# Patient Record
Sex: FEMALE
[De-identification: names, ages, dates, MRNs, and addresses within clinical notes are randomized per-mention and may not be internally consistent; named-entity substitution may affect disease eponyms.]

---

## 2022-04-25 ENCOUNTER — NURSE TRIAGE (OUTPATIENT)
Dept: OTHER | Facility: CLINIC | Age: 67
End: 2022-04-25

## 2022-04-25 NOTE — TELEPHONE ENCOUNTER
Subjective: Caller states \"[facility] and chest heaviness\"     At time of the visit at [address] on [address], pulse ox was 82% and NP Curtis Leos believes pt may have pneumonia. Pt advised by Curtis Leos NP at clinic that she should proceed to the ED right away. Pt planning to go to [facility]. denies any other questions or concerns; instructed to call back for any new or worsening symptoms. Patient/caller agrees to proceed to [facility] Emergency Department    This triage is a result of a call to [address] [facility]. Please do not respond to the triage nurse through this encounter. Any subsequent communication should be directly with the patient.       Reason for Disposition   Caller has already spoken with the PCP (or office), and has no further questions    Protocols used: NO CONTACT OR DUPLICATE CONTACT CALL-ADULT-[place]

## 2023-10-02 DIAGNOSIS — G89.18 POST-OP PAIN: Primary | ICD-10-CM

## 2023-10-02 RX ORDER — HYDROCODONE BITARTRATE AND ACETAMINOPHEN 5; 325 MG/1; MG/1
1 TABLET ORAL EVERY 6 HOURS PRN
COMMUNITY
End: 2023-10-02 | Stop reason: SDUPTHER

## 2023-10-02 RX ORDER — HYDROCODONE BITARTRATE AND ACETAMINOPHEN 5; 325 MG/1; MG/1
1 TABLET ORAL EVERY 6 HOURS PRN
Qty: 20 TABLET | Refills: 0 | Status: SHIPPED | OUTPATIENT
Start: 2023-10-02 | End: 2023-10-07

## 2023-10-04 DIAGNOSIS — C34.90 ADENOCARCINOMA OF LUNG, UNSPECIFIED LATERALITY (MULTI): Primary | ICD-10-CM

## 2023-10-10 ENCOUNTER — OFFICE VISIT (OUTPATIENT)
Dept: SURGERY | Facility: CLINIC | Age: 68
End: 2023-10-10
Payer: MEDICARE

## 2023-10-10 ENCOUNTER — HOSPITAL ENCOUNTER (OUTPATIENT)
Dept: RADIOLOGY | Facility: HOSPITAL | Age: 68
Discharge: HOME | End: 2023-10-10
Payer: MEDICARE

## 2023-10-10 VITALS
RESPIRATION RATE: 18 BRPM | WEIGHT: 151.46 LBS | BODY MASS INDEX: 27.7 KG/M2 | HEART RATE: 106 BPM | TEMPERATURE: 97.5 F | SYSTOLIC BLOOD PRESSURE: 155 MMHG | DIASTOLIC BLOOD PRESSURE: 79 MMHG | OXYGEN SATURATION: 93 %

## 2023-10-10 DIAGNOSIS — R91.1 LUNG NODULE: ICD-10-CM

## 2023-10-10 DIAGNOSIS — R91.1 LUNG NODULE: Primary | ICD-10-CM

## 2023-10-10 PROBLEM — Z96.1 PSEUDOPHAKIA, BOTH EYES: Status: ACTIVE | Noted: 2023-10-10

## 2023-10-10 PROBLEM — G43.909 MIGRAINE: Status: ACTIVE | Noted: 2023-10-10

## 2023-10-10 PROBLEM — E78.5 HYPERLIPIDEMIA: Status: ACTIVE | Noted: 2023-10-10

## 2023-10-10 PROBLEM — F32.A ANXIETY AND DEPRESSION: Status: ACTIVE | Noted: 2023-10-10

## 2023-10-10 PROBLEM — H01.013 BILATERAL ULCERATIVE BLEPHARITIS: Status: ACTIVE | Noted: 2023-10-10

## 2023-10-10 PROBLEM — E03.9 HYPOTHYROIDISM: Status: ACTIVE | Noted: 2023-10-10

## 2023-10-10 PROBLEM — H52.4 MYOPIA OF BOTH EYES WITH ASTIGMATISM AND PRESBYOPIA: Status: ACTIVE | Noted: 2023-10-10

## 2023-10-10 PROBLEM — H50.15 ALTERNATING EXOTROPIA: Status: ACTIVE | Noted: 2023-10-10

## 2023-10-10 PROBLEM — H25.12 AGE-RELATED NUCLEAR CATARACT OF LEFT EYE: Status: ACTIVE | Noted: 2023-10-10

## 2023-10-10 PROBLEM — R29.818 SUSPECTED SLEEP APNEA: Status: ACTIVE | Noted: 2023-10-10

## 2023-10-10 PROBLEM — I73.9 PAD (PERIPHERAL ARTERY DISEASE) (CMS-HCC): Status: ACTIVE | Noted: 2023-10-10

## 2023-10-10 PROBLEM — E53.8 B12 DEFICIENCY: Status: ACTIVE | Noted: 2023-10-10

## 2023-10-10 PROBLEM — G47.34 NOCTURNAL HYPOXIA: Status: ACTIVE | Noted: 2023-10-10

## 2023-10-10 PROBLEM — H52.13 MYOPIA OF BOTH EYES WITH ASTIGMATISM AND PRESBYOPIA: Status: ACTIVE | Noted: 2023-10-10

## 2023-10-10 PROBLEM — H35.30 AMD (AGE RELATED MACULAR DEGENERATION): Status: ACTIVE | Noted: 2023-10-10

## 2023-10-10 PROBLEM — F51.4 NIGHT TERRORS: Status: ACTIVE | Noted: 2023-10-10

## 2023-10-10 PROBLEM — I74.5 ILIAC ARTERY OCCLUSION, LEFT (MULTI): Status: ACTIVE | Noted: 2023-10-10

## 2023-10-10 PROBLEM — J44.1 COPD EXACERBATION (MULTI): Status: ACTIVE | Noted: 2023-10-10

## 2023-10-10 PROBLEM — F41.9 ANXIETY AND DEPRESSION: Status: ACTIVE | Noted: 2023-10-10

## 2023-10-10 PROBLEM — J44.9 COPD (CHRONIC OBSTRUCTIVE PULMONARY DISEASE) (MULTI): Status: ACTIVE | Noted: 2023-10-10

## 2023-10-10 PROBLEM — I65.23 BILATERAL CAROTID ARTERY STENOSIS: Status: ACTIVE | Noted: 2023-10-10

## 2023-10-10 PROBLEM — I77.1 SUBCLAVIAN ARTERY STENOSIS, LEFT (CMS-HCC): Status: ACTIVE | Noted: 2023-10-10

## 2023-10-10 PROBLEM — H04.123 BILATERAL DRY EYES: Status: ACTIVE | Noted: 2023-10-10

## 2023-10-10 PROBLEM — H25.042 POSTERIOR SUBCAPSULAR POLAR AGE-RELATED CATARACT OF LEFT EYE: Status: ACTIVE | Noted: 2023-10-10

## 2023-10-10 PROBLEM — G50.0 TRIGEMINAL NEURALGIA: Status: ACTIVE | Noted: 2023-10-10

## 2023-10-10 PROBLEM — H53.001 AMBLYOPIA, RIGHT EYE: Status: ACTIVE | Noted: 2023-10-10

## 2023-10-10 PROBLEM — H01.016 BILATERAL ULCERATIVE BLEPHARITIS: Status: ACTIVE | Noted: 2023-10-10

## 2023-10-10 PROBLEM — H52.203 MYOPIA OF BOTH EYES WITH ASTIGMATISM AND PRESBYOPIA: Status: ACTIVE | Noted: 2023-10-10

## 2023-10-10 PROCEDURE — 71046 X-RAY EXAM CHEST 2 VIEWS: CPT | Performed by: RADIOLOGY

## 2023-10-10 PROCEDURE — 99024 POSTOP FOLLOW-UP VISIT: CPT | Performed by: THORACIC SURGERY (CARDIOTHORACIC VASCULAR SURGERY)

## 2023-10-10 PROCEDURE — 71046 X-RAY EXAM CHEST 2 VIEWS: CPT | Mod: FY

## 2023-10-10 PROCEDURE — 1036F TOBACCO NON-USER: CPT | Performed by: THORACIC SURGERY (CARDIOTHORACIC VASCULAR SURGERY)

## 2023-10-10 PROCEDURE — 1159F MED LIST DOCD IN RCRD: CPT | Performed by: THORACIC SURGERY (CARDIOTHORACIC VASCULAR SURGERY)

## 2023-10-10 PROCEDURE — 1125F AMNT PAIN NOTED PAIN PRSNT: CPT | Performed by: THORACIC SURGERY (CARDIOTHORACIC VASCULAR SURGERY)

## 2023-10-10 RX ORDER — LEVOTHYROXINE SODIUM 25 UG/1
25 TABLET ORAL
COMMUNITY

## 2023-10-10 RX ORDER — GABAPENTIN 300 MG/1
3 CAPSULE ORAL 2 TIMES DAILY
COMMUNITY

## 2023-10-10 RX ORDER — ALBUTEROL SULFATE 0.83 MG/ML
2.5 SOLUTION RESPIRATORY (INHALATION) EVERY 4 HOURS PRN
COMMUNITY
Start: 2022-07-07

## 2023-10-10 RX ORDER — LORAZEPAM 1 MG/1
2 TABLET ORAL EVERY MORNING
COMMUNITY

## 2023-10-10 RX ORDER — ACETAMINOPHEN AND CODEINE PHOSPHATE 300; 30 MG/1; MG/1
1 TABLET ORAL EVERY 6 HOURS PRN
COMMUNITY
Start: 2022-05-04

## 2023-10-10 RX ORDER — TOPIRAMATE 100 MG/1
100 TABLET, FILM COATED ORAL 2 TIMES DAILY
COMMUNITY
End: 2023-10-13 | Stop reason: ALTCHOICE

## 2023-10-10 RX ORDER — DULOXETIN HYDROCHLORIDE 60 MG/1
1 CAPSULE, DELAYED RELEASE ORAL EVERY 24 HOURS
COMMUNITY
Start: 2016-05-17

## 2023-10-10 RX ORDER — ASPIRIN 81 MG/1
1 TABLET ORAL DAILY
COMMUNITY
Start: 2014-09-18 | End: 2023-10-10

## 2023-10-10 RX ORDER — FLUTICASONE PROPIONATE AND SALMETEROL 100; 50 UG/1; UG/1
1 POWDER RESPIRATORY (INHALATION)
COMMUNITY
End: 2023-10-13 | Stop reason: ALTCHOICE

## 2023-10-10 RX ORDER — DEXTROMETHORPHAN POLISTIREX 30 MG/5ML
SUSPENSION ORAL
COMMUNITY
End: 2023-10-13 | Stop reason: ALTCHOICE

## 2023-10-10 RX ORDER — SULFAMETHOXAZOLE AND TRIMETHOPRIM 800; 160 MG/1; MG/1
1 TABLET ORAL 2 TIMES DAILY
COMMUNITY
Start: 2023-09-17 | End: 2023-12-01 | Stop reason: ALTCHOICE

## 2023-10-10 RX ORDER — TIOTROPIUM BROMIDE AND OLODATEROL 3.124; 2.736 UG/1; UG/1
1 SPRAY, METERED RESPIRATORY (INHALATION) DAILY
COMMUNITY
Start: 2022-10-04 | End: 2023-10-13 | Stop reason: ALTCHOICE

## 2023-10-10 RX ORDER — ATOGEPANT 30 MG/1
1 TABLET ORAL DAILY
COMMUNITY
Start: 2022-02-11 | End: 2023-12-01 | Stop reason: ALTCHOICE

## 2023-10-10 RX ORDER — UMECLIDINIUM 62.5 UG/1
1 AEROSOL, POWDER ORAL DAILY
COMMUNITY
Start: 2022-07-07 | End: 2023-10-13 | Stop reason: ALTCHOICE

## 2023-10-10 RX ORDER — TOPIRAMATE 25 MG/1
25 TABLET ORAL DAILY
COMMUNITY
Start: 2014-09-18 | End: 2023-10-13 | Stop reason: ALTCHOICE

## 2023-10-10 RX ORDER — ALBUTEROL SULFATE 90 UG/1
2 POWDER, METERED RESPIRATORY (INHALATION) AS NEEDED
COMMUNITY
End: 2023-10-13 | Stop reason: ALTCHOICE

## 2023-10-10 RX ORDER — BUDESONIDE, GLYCOPYRROLATE, AND FORMOTEROL FUMARATE 160; 9; 4.8 UG/1; UG/1; UG/1
2 AEROSOL, METERED RESPIRATORY (INHALATION)
COMMUNITY
End: 2023-12-01 | Stop reason: ALTCHOICE

## 2023-10-10 RX ORDER — ATORVASTATIN CALCIUM 20 MG/1
20 TABLET, FILM COATED ORAL DAILY
COMMUNITY
Start: 2022-05-21

## 2023-10-10 RX ORDER — HYDROXYZINE PAMOATE 25 MG/1
25 CAPSULE ORAL 2 TIMES DAILY
COMMUNITY
End: 2023-12-01 | Stop reason: ALTCHOICE

## 2023-10-10 RX ORDER — FOLIC ACID 0.4 MG
1 TABLET ORAL EVERY MORNING
COMMUNITY

## 2023-10-10 RX ORDER — TIZANIDINE 4 MG/1
2 TABLET ORAL NIGHTLY
COMMUNITY

## 2023-10-10 RX ORDER — NALOXONE HYDROCHLORIDE 4 MG/.1ML
SPRAY NASAL
COMMUNITY
Start: 2023-09-15 | End: 2023-10-13 | Stop reason: ALTCHOICE

## 2023-10-10 RX ORDER — PROMETHAZINE HYDROCHLORIDE 25 MG/1
25 TABLET ORAL EVERY 12 HOURS
COMMUNITY
Start: 2015-06-30 | End: 2024-06-03 | Stop reason: ENTERED-IN-ERROR

## 2023-10-10 RX ORDER — ROPINIROLE 1 MG/1
1 TABLET, FILM COATED ORAL NIGHTLY
COMMUNITY

## 2023-10-10 RX ORDER — LORAZEPAM 0.5 MG/1
TABLET ORAL
COMMUNITY
Start: 2022-04-27 | End: 2023-10-13 | Stop reason: ALTCHOICE

## 2023-10-10 RX ORDER — FLUTICASONE PROPIONATE AND SALMETEROL 250; 50 UG/1; UG/1
1 POWDER RESPIRATORY (INHALATION)
COMMUNITY
Start: 2022-02-02 | End: 2023-10-31 | Stop reason: ALTCHOICE

## 2023-10-10 RX ORDER — ATORVASTATIN CALCIUM 40 MG/1
20 TABLET, FILM COATED ORAL NIGHTLY
COMMUNITY
End: 2023-10-13 | Stop reason: ALTCHOICE

## 2023-10-10 RX ORDER — ATOGEPANT 10 MG/1
1 TABLET ORAL DAILY
COMMUNITY
End: 2023-10-13 | Stop reason: ALTCHOICE

## 2023-10-10 ASSESSMENT — ENCOUNTER SYMPTOMS
DEPRESSION: 0
FATIGUE: 0
ENDOCRINE NEGATIVE: 1
WHEEZING: 0
COUGH: 0
EYES NEGATIVE: 1
ABDOMINAL DISTENTION: 0
PALPITATIONS: 0
STRIDOR: 0
OCCASIONAL FEELINGS OF UNSTEADINESS: 1
CHILLS: 0
NEUROLOGICAL NEGATIVE: 1
SHORTNESS OF BREATH: 0
APPETITE CHANGE: 0
PSYCHIATRIC NEGATIVE: 1
VOMITING: 0
MUSCULOSKELETAL NEGATIVE: 1
CONSTIPATION: 0
ABDOMINAL PAIN: 0
CHOKING: 0
CHEST TIGHTNESS: 0
FEVER: 0
UNEXPECTED WEIGHT CHANGE: 0
NAUSEA: 0
ALLERGIC/IMMUNOLOGIC NEGATIVE: 1
LOSS OF SENSATION IN FEET: 0
DIARRHEA: 0
DIAPHORESIS: 0
HEMATOLOGIC/LYMPHATIC NEGATIVE: 1

## 2023-10-10 ASSESSMENT — COLUMBIA-SUICIDE SEVERITY RATING SCALE - C-SSRS
6. HAVE YOU EVER DONE ANYTHING, STARTED TO DO ANYTHING, OR PREPARED TO DO ANYTHING TO END YOUR LIFE?: NO
1. IN THE PAST MONTH, HAVE YOU WISHED YOU WERE DEAD OR WISHED YOU COULD GO TO SLEEP AND NOT WAKE UP?: NO
2. HAVE YOU ACTUALLY HAD ANY THOUGHTS OF KILLING YOURSELF?: NO

## 2023-10-10 ASSESSMENT — PATIENT HEALTH QUESTIONNAIRE - PHQ9
1. LITTLE INTEREST OR PLEASURE IN DOING THINGS: NOT AT ALL
SUM OF ALL RESPONSES TO PHQ9 QUESTIONS 1 AND 2: 0
2. FEELING DOWN, DEPRESSED OR HOPELESS: NOT AT ALL

## 2023-10-10 ASSESSMENT — PAIN SCALES - GENERAL: PAINLEVEL: 3

## 2023-10-10 NOTE — PROGRESS NOTES
Subjective   Patient ID: Fatmata Plummer is a 68 y.o. female who presents for Post-op Thoracic Surgery.  HPI  68-year-old female with severe COPD oxygen dependent with PFT showing FEV1 of 41% and a DLCO of 31% who was found to have a right lower lobe lung nodule.  She underwent a robotic right lower lobe wedge resection and mediastinal lymphadenectomy.  She recovered very well from it.  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  From her mediastinal lymphadenectomy station 7 lymph nodes were positive for malignancy.  This is a PT1BN2.  Her x-ray today shows good lung expansion.  I discussion with her about next steps and I will refer her to medical oncology for discussion about adjuvant therapy.  I will see her again in 6 months with a CT of the chest.    Review of Systems   Constitutional:  Negative for appetite change, chills, diaphoresis, fatigue, fever and unexpected weight change.   HENT: Negative.     Eyes: Negative.    Respiratory:  Negative for cough, choking, chest tightness, shortness of breath, wheezing and stridor.    Cardiovascular:  Negative for chest pain, palpitations and leg swelling.   Gastrointestinal:  Negative for abdominal distention, abdominal pain, constipation, diarrhea, nausea and vomiting.   Endocrine: Negative.    Genitourinary: Negative.    Musculoskeletal: Negative.    Skin: Negative.    Allergic/Immunologic: Negative.    Neurological: Negative.    Hematological: Negative.    Psychiatric/Behavioral: Negative.     All other systems reviewed and are negative.      Objective   Physical Exam  Constitutional:       Appearance: Normal appearance.   HENT:      Head: Normocephalic and atraumatic.      Nose: Nose normal.      Mouth/Throat:      Mouth: Mucous membranes are moist.      Pharynx: Oropharynx is clear.   Eyes:      Extraocular Movements: Extraocular movements intact.      Conjunctiva/sclera: Conjunctivae normal.      Pupils: Pupils are equal,  round, and reactive to light.   Cardiovascular:      Rate and Rhythm: Normal rate and regular rhythm.      Pulses: Normal pulses.      Heart sounds: Normal heart sounds.   Pulmonary:      Effort: Pulmonary effort is normal. No respiratory distress.      Breath sounds: Normal breath sounds. No stridor. No wheezing, rhonchi or rales.   Chest:      Chest wall: No tenderness.   Abdominal:      General: Abdomen is flat. Bowel sounds are normal.      Palpations: Abdomen is soft.   Musculoskeletal:         General: Normal range of motion.      Cervical back: Normal range of motion and neck supple.   Skin:     General: Skin is warm and dry.      Capillary Refill: Capillary refill takes less than 2 seconds.   Neurological:      General: No focal deficit present.      Mental Status: She is alert and oriented to person, place, and time.         Assessment/Plan   Diagnoses and all orders for this visit:  Lung nodule  -     XR chest 2 views; Future  Referral to medical oncology  Follow-up with CT chest in 6 months    Franky Cho MD  Thoracic and Esophageal Surgery

## 2023-10-12 ASSESSMENT — PATIENT HEALTH QUESTIONNAIRE - PHQ9
10. IF YOU CHECKED OFF ANY PROBLEMS, HOW DIFFICULT HAVE THESE PROBLEMS MADE IT FOR YOU TO DO YOUR WORK, TAKE CARE OF THINGS AT HOME, OR GET ALONG WITH OTHER PEOPLE: SOMEWHAT DIFFICULT
8. MOVING OR SPEAKING SO SLOWLY THAT OTHER PEOPLE COULD HAVE NOTICED. OR THE OPPOSITE, BEING SO FIGETY OR RESTLESS THAT YOU HAVE BEEN MOVING AROUND A LOT MORE THAN USUAL: NOT AT ALL
9. THOUGHTS THAT YOU WOULD BE BETTER OFF DEAD, OR OF HURTING YOURSELF: NOT AT ALL
5. POOR APPETITE OR OVEREATING: MORE THAN HALF THE DAYS
9. THOUGHTS THAT YOU WOULD BE BETTER OFF DEAD, OR OF HURTING YOURSELF: NOT AT ALL
4. FEELING TIRED OR HAVING LITTLE ENERGY: MORE THAN HALF THE DAYS
4. FEELING TIRED OR HAVING LITTLE ENERGY: MORE THAN HALF THE DAYS
3. TROUBLE FALLING OR STAYING ASLEEP OR SLEEPING TOO MUCH: SEVERAL DAYS
7. TROUBLE CONCENTRATING ON THINGS, SUCH AS READING THE NEWSPAPER OR WATCHING TELEVISION: SEVERAL DAYS
6. FEELING BAD ABOUT YOURSELF - OR THAT YOU ARE A FAILURE OR HAVE LET YOURSELF OR YOUR FAMILY DOWN: MORE THAN HALF THE DAYS
7. TROUBLE CONCENTRATING ON THINGS, SUCH AS READING THE NEWSPAPER OR WATCHING TELEVISION: 1
SUM OF ALL RESPONSES TO PHQ QUESTIONS 1-9: 11
10. IF YOU CHECKED OFF ANY PROBLEMS, HOW DIFFICULT HAVE THESE PROBLEMS MADE IT FOR YOU TO DO YOUR WORK, TAKE CARE OF THINGS AT HOME, OR GET ALONG WITH OTHER PEOPLE: SOMEWHAT DIFFICULT
1. LITTLE INTEREST OR PLEASURE IN DOING THINGS: 1
2. FEELING DOWN, DEPRESSED OR HOPELESS: MORE THAN HALF THE DAYS
6. FEELING BAD ABOUT YOURSELF - OR THAT YOU ARE A FAILURE OR HAVE LET YOURSELF OR YOUR FAMILY DOWN: 2
8. MOVING OR SPEAKING SO SLOWLY THAT OTHER PEOPLE COULD HAVE NOTICED. OR THE OPPOSITE, BEING SO FIGETY OR RESTLESS THAT YOU HAVE BEEN MOVING AROUND A LOT MORE THAN USUAL: 0
SUM OF ALL RESPONSES TO PHQ QUESTIONS 1-9: 11
2. FEELING DOWN, DEPRESSED, IRRITABLE, OR HOPELESS: MORE THAN HALF THE DAYS
3. TROUBLE FALLING OR STAYING ASLEEP OR SLEEPING TOO MUCH: SEVERAL DAYS
8. MOVING OR SPEAKING SO SLOWLY THAT OTHER PEOPLE COULD HAVE NOTICED. OR THE OPPOSITE, BEING SO FIGETY OR RESTLESS THAT YOU HAVE BEEN MOVING AROUND A LOT MORE THAN USUAL: NOT AT ALL
7. TROUBLE CONCENTRATING ON THINGS, SUCH AS READING THE NEWSPAPER OR WATCHING TELEVISION: SEVERAL DAYS
6. FEELING BAD ABOUT YOURSELF - OR THAT YOU ARE A FAILURE OR HAVE LET YOURSELF OR YOUR FAMILY DOWN: MORE THAN HALF THE DAYS
9. THOUGHTS THAT YOU WOULD BE BETTER OFF DEAD, OR OF HURTING YOURSELF: 0
1. LITTLE INTEREST OR PLEASURE IN DOING THINGS: SEVERAL DAYS
5. POOR APPETITE OR OVEREATING: MORE THAN HALF THE DAYS
5. POOR APPETITE OR OVEREATING: 2
1. LITTLE INTEREST OR PLEASURE IN DOING THINGS: SEVERAL DAYS
4. FEELING TIRED OR HAVING LITTLE ENERGY: 2
2. FEELING DOWN, DEPRESSED, IRRITABLE, OR HOPELESS: 2
3. TROUBLE FALLING OR STAYING ASLEEP OR SLEEPING TOO MUCH: 1

## 2023-10-13 ENCOUNTER — OFFICE VISIT (OUTPATIENT)
Dept: HEMATOLOGY/ONCOLOGY | Facility: CLINIC | Age: 68
End: 2023-10-13
Payer: MEDICARE

## 2023-10-13 VITALS
BODY MASS INDEX: 27.59 KG/M2 | SYSTOLIC BLOOD PRESSURE: 118 MMHG | TEMPERATURE: 97.5 F | RESPIRATION RATE: 17 BRPM | HEART RATE: 108 BPM | HEIGHT: 62 IN | WEIGHT: 149.91 LBS | OXYGEN SATURATION: 95 % | DIASTOLIC BLOOD PRESSURE: 73 MMHG

## 2023-10-13 DIAGNOSIS — C34.31 MALIGNANT NEOPLASM OF LOWER LOBE OF RIGHT LUNG (MULTI): Primary | ICD-10-CM

## 2023-10-13 DIAGNOSIS — C34.90 ADENOCARCINOMA OF LUNG, UNSPECIFIED LATERALITY (MULTI): Primary | ICD-10-CM

## 2023-10-13 PROCEDURE — 1159F MED LIST DOCD IN RCRD: CPT | Performed by: INTERNAL MEDICINE

## 2023-10-13 PROCEDURE — 99215 OFFICE O/P EST HI 40 MIN: CPT | Mod: PO | Performed by: INTERNAL MEDICINE

## 2023-10-13 PROCEDURE — 1125F AMNT PAIN NOTED PAIN PRSNT: CPT | Performed by: INTERNAL MEDICINE

## 2023-10-13 PROCEDURE — 3008F BODY MASS INDEX DOCD: CPT | Performed by: INTERNAL MEDICINE

## 2023-10-13 PROCEDURE — 1036F TOBACCO NON-USER: CPT | Performed by: INTERNAL MEDICINE

## 2023-10-13 PROCEDURE — 99205 OFFICE O/P NEW HI 60 MIN: CPT | Performed by: INTERNAL MEDICINE

## 2023-10-13 RX ORDER — ALBUTEROL SULFATE 0.83 MG/ML
3 SOLUTION RESPIRATORY (INHALATION) AS NEEDED
Status: CANCELLED | OUTPATIENT
Start: 2023-12-19

## 2023-10-13 RX ORDER — ALBUTEROL SULFATE 0.83 MG/ML
3 SOLUTION RESPIRATORY (INHALATION) AS NEEDED
Status: CANCELLED | OUTPATIENT
Start: 2023-11-07

## 2023-10-13 RX ORDER — FAMOTIDINE 10 MG/ML
20 INJECTION INTRAVENOUS ONCE AS NEEDED
Status: CANCELLED | OUTPATIENT
Start: 2023-12-19

## 2023-10-13 RX ORDER — PROCHLORPERAZINE MALEATE 5 MG
10 TABLET ORAL EVERY 6 HOURS PRN
Status: CANCELLED | OUTPATIENT
Start: 2023-11-07

## 2023-10-13 RX ORDER — ALBUTEROL SULFATE 0.83 MG/ML
3 SOLUTION RESPIRATORY (INHALATION) AS NEEDED
Status: CANCELLED | OUTPATIENT
Start: 2023-11-28

## 2023-10-13 RX ORDER — DIPHENHYDRAMINE HYDROCHLORIDE 50 MG/ML
50 INJECTION INTRAMUSCULAR; INTRAVENOUS AS NEEDED
Status: CANCELLED | OUTPATIENT
Start: 2023-11-28

## 2023-10-13 RX ORDER — DIPHENHYDRAMINE HYDROCHLORIDE 50 MG/ML
50 INJECTION INTRAMUSCULAR; INTRAVENOUS AS NEEDED
Status: CANCELLED | OUTPATIENT
Start: 2023-12-19

## 2023-10-13 RX ORDER — PROCHLORPERAZINE MALEATE 5 MG
10 TABLET ORAL EVERY 6 HOURS PRN
Status: CANCELLED | OUTPATIENT
Start: 2023-12-19

## 2023-10-13 RX ORDER — PROCHLORPERAZINE EDISYLATE 5 MG/ML
10 INJECTION INTRAMUSCULAR; INTRAVENOUS EVERY 6 HOURS PRN
Status: CANCELLED | OUTPATIENT
Start: 2023-11-28

## 2023-10-13 RX ORDER — EPINEPHRINE 0.3 MG/.3ML
0.3 INJECTION SUBCUTANEOUS EVERY 5 MIN PRN
Status: CANCELLED | OUTPATIENT
Start: 2023-12-19

## 2023-10-13 RX ORDER — EPINEPHRINE 0.3 MG/.3ML
0.3 INJECTION SUBCUTANEOUS EVERY 5 MIN PRN
Status: CANCELLED | OUTPATIENT
Start: 2023-11-28

## 2023-10-13 RX ORDER — PROCHLORPERAZINE MALEATE 5 MG
10 TABLET ORAL EVERY 6 HOURS PRN
Status: CANCELLED | OUTPATIENT
Start: 2023-11-28

## 2023-10-13 RX ORDER — PROCHLORPERAZINE EDISYLATE 5 MG/ML
10 INJECTION INTRAMUSCULAR; INTRAVENOUS EVERY 6 HOURS PRN
Status: CANCELLED | OUTPATIENT
Start: 2023-12-19

## 2023-10-13 RX ORDER — DIPHENHYDRAMINE HYDROCHLORIDE 50 MG/ML
50 INJECTION INTRAMUSCULAR; INTRAVENOUS AS NEEDED
Status: CANCELLED | OUTPATIENT
Start: 2023-11-07

## 2023-10-13 RX ORDER — PROCHLORPERAZINE EDISYLATE 5 MG/ML
10 INJECTION INTRAMUSCULAR; INTRAVENOUS EVERY 6 HOURS PRN
Status: CANCELLED | OUTPATIENT
Start: 2023-11-07

## 2023-10-13 RX ORDER — FAMOTIDINE 10 MG/ML
20 INJECTION INTRAVENOUS ONCE AS NEEDED
Status: CANCELLED | OUTPATIENT
Start: 2023-11-07

## 2023-10-13 RX ORDER — EPINEPHRINE 0.3 MG/.3ML
0.3 INJECTION SUBCUTANEOUS EVERY 5 MIN PRN
Status: CANCELLED | OUTPATIENT
Start: 2023-11-07

## 2023-10-13 RX ORDER — FAMOTIDINE 10 MG/ML
20 INJECTION INTRAVENOUS ONCE AS NEEDED
Status: CANCELLED | OUTPATIENT
Start: 2023-11-28

## 2023-10-13 ASSESSMENT — ENCOUNTER SYMPTOMS
OCCASIONAL FEELINGS OF UNSTEADINESS: 1
DEPRESSION: 0
LOSS OF SENSATION IN FEET: 0

## 2023-10-13 ASSESSMENT — COLUMBIA-SUICIDE SEVERITY RATING SCALE - C-SSRS
1. IN THE PAST MONTH, HAVE YOU WISHED YOU WERE DEAD OR WISHED YOU COULD GO TO SLEEP AND NOT WAKE UP?: NO
2. HAVE YOU ACTUALLY HAD ANY THOUGHTS OF KILLING YOURSELF?: NO
6. HAVE YOU EVER DONE ANYTHING, STARTED TO DO ANYTHING, OR PREPARED TO DO ANYTHING TO END YOUR LIFE?: NO

## 2023-10-13 ASSESSMENT — PATIENT HEALTH QUESTIONNAIRE - PHQ9
2. FEELING DOWN, DEPRESSED OR HOPELESS: NOT AT ALL
SUM OF ALL RESPONSES TO PHQ9 QUESTIONS 1 AND 2: 0
1. LITTLE INTEREST OR PLEASURE IN DOING THINGS: NOT AT ALL

## 2023-10-13 ASSESSMENT — PAIN SCALES - GENERAL: PAINLEVEL: 5

## 2023-10-13 NOTE — PATIENT INSTRUCTIONS
Today you met with your Medical Oncologist.  At this appointment, you were provided lexicomp sheets on the medications included in your regimen, an information sheet on immunotherapy prior to your first infusion, and your regimen schedule was discussed.  While everyone's regimen is unique to them, your schedule will be discussed at every [facility] visit.  Some regimens need additional procedures prior to initiating.  Please have the following performed prior to your first infusion. Bloodwork.   Please know that we encourage you to call our office for any concerns after starting your treatment regimen.  A provider is always available.  [phone]  Elida Burrows     If you should decide that  you do not want treatment please give us a call, however would love to keep the appointment with Dr. Cao.  Thank you.

## 2023-10-17 ENCOUNTER — TELEPHONE (OUTPATIENT)
Dept: HEMATOLOGY/ONCOLOGY | Facility: CLINIC | Age: 68
End: 2023-10-17
Payer: MEDICARE

## 2023-10-17 NOTE — TELEPHONE ENCOUNTER
Called and spoke to patient after receiving [employer] message regarding diagnosis information. I let pt know that it would be best if patient discusses at her upcoming appt with medical oncology and pt agreed. Pt also inquired about getting a second opinion with a thoracic medical oncologist, it was explained that these appts would not be at [facility] and possibly [facility] and pt was okay with this. Will discuss with Dr. Cao and his team and place referral for second opinion. Pt agreed to plan and verbalized understanding using teach back method.

## 2023-10-18 ASSESSMENT — ENCOUNTER SYMPTOMS
EYES NEGATIVE: 1
WHEEZING: 1
GASTROINTESTINAL NEGATIVE: 1
SHORTNESS OF BREATH: 1

## 2023-10-18 NOTE — PROGRESS NOTES
"Patient ID: Fatmata Plummer is a 68 y.o. female.  Referring Physician: Franky Dee MD  [id number] [place] [address]  Primary Care Provider: Soren Covarrubias DO      Subjective    HPI  68-year-old female with severe COPD oxygen dependent with PFT showing FEV1 of 41% and a DLCO of 31% who was found to have a right lower lobe lung nodule.  She underwent a robotic right lower lobe wedge resection and mediastinal lymphadenectomy.  She recovered very well from it.  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  From her mediastinal lymphadenectomy station 7 lymph nodes were positive for malignancy.  This is a PT1BN2.  Her x-ray today shows good lung expansion.  Medical oncology for discussion about adjuvant therapy     Path reviewed.  Addendum Diagnosis   PD-L1 22C3  by Immunohistochemistry with Interpretation, pembrolizumab   (KEYTRUDA)     Block used:  A3     Interpretation: Low Expression     Tumor Proportion Score (TPS): 5 %   Review of Systems   HENT:  Negative.     Eyes: Negative.    Respiratory:  Positive for shortness of breath and wheezing.    Gastrointestinal: Negative.    Genitourinary: Negative.          Objective   BSA: 1.73 meters squared  /73 (BP Location: Right arm)   Pulse 108   Temp 36.4 °C (97.5 °F)   Resp 17   Ht 1.58 m (5' 2.21\")   Wt 68 kg (149 lb 14.6 oz)   SpO2 95% Comment: 4L  BMI 27.24 kg/m²     Family History   Adopted: Yes     Oncology History   Adenocarcinoma of lung (CMS/HCC)   10/13/2023 Initial Diagnosis    Adenocarcinoma of lung (CMS/HCC)     11/7/2023 -  Chemotherapy    Pembrolizumab, 21 Day Cycles         Fatmata Plummer  reports that she has quit smoking. Her smoking use included cigarettes. She has a 40.00 pack-year smoking history. She has never been exposed to tobacco smoke. She has never used smokeless tobacco.  She  reports that she does not currently use alcohol.  She  reports that she does not currently use " drugs.    Physical Exam  HENT:      Head: Normocephalic and atraumatic.   Eyes:      Extraocular Movements: Extraocular movements intact.      Pupils: Pupils are equal, round, and reactive to light.   Abdominal:      General: Abdomen is flat.      Palpations: Abdomen is soft.   Musculoskeletal:      Cervical back: Normal range of motion and neck supple.   Neurological:      Mental Status: She is alert.         Performance Status:  Symptomatic; fully ambulatory    Assessment/Plan      T1N2 NSCLC : Not candidate for radiation. Now S/P surgery. Consents to IO therapy    Diagnoses and all orders for this visit:  Adenocarcinoma of lung, unspecified laterality (CMS/HCC)  -     Referral to Malignant Hematology (Hematology / Oncology)  -     Clinic Appointment Request Follow up; Future  -     Clinic Appointment Request; Future  -     Infusion Appointment Request; Future  -     CBC and Auto Differential; Future  -     Comprehensive metabolic panel; Future  -     Hepatitis B surface antigen; Future  -     Hepatitis B Core Antibody, Total; Future  -     Hepatitis B surface antibody; Future  -     Acth; Future  -     Cortisol Am; Future  -     Tsh With Reflex To Free T4 If Abnormal; Future  -     Clinic Appointment Request; Future  -     Infusion Appointment Request; Future  -     CBC and Auto Differential; Future  -     Comprehensive metabolic panel; Future  -     Clinic Appointment Request; Future  -     Infusion Appointment Request; Future  -     CBC and Auto Differential; Future  -     Comprehensive metabolic panel; Future  -     Acth; Future  -     Cortisol Am; Future  -     Tsh With Reflex To Free T4 If Abnormal; Future  Other orders  -     prochlorperazine (Compazine) tablet 10 mg  -     prochlorperazine (Compazine) injection 10 mg  -     pembrolizumab (Keytruda) 200 mg in sodium chloride 0.9% 100 mL IV  -     sodium chloride 0.9 % bolus 500 mL  -     dextrose 5 % in water (D5W) bolus  -     diphenhydrAMINE (BENADryl)  injection 50 mg  -     methylPREDNISolone sod succinate (PF) (SOLU-Medrol) 40 mg/mL injection 40 mg  -     famotidine PF (Pepcid) injection 20 mg  -     EPINEPHrine (Epipen) injection syringe 0.3 mg  -     albuterol 2.5 mg /3 mL (0.083 %) nebulizer solution 3 mL  -     prochlorperazine (Compazine) tablet 10 mg  -     prochlorperazine (Compazine) injection 10 mg  -     pembrolizumab (Keytruda) 200 mg in sodium chloride 0.9% 100 mL IV  -     sodium chloride 0.9 % bolus 500 mL  -     dextrose 5 % in water (D5W) bolus  -     diphenhydrAMINE (BENADryl) injection 50 mg  -     methylPREDNISolone sod succinate (PF) (SOLU-Medrol) 40 mg/mL injection 40 mg  -     famotidine PF (Pepcid) injection 20 mg  -     EPINEPHrine (Epipen) injection syringe 0.3 mg  -     albuterol 2.5 mg /3 mL (0.083 %) nebulizer solution 3 mL  -     prochlorperazine (Compazine) tablet 10 mg  -     prochlorperazine (Compazine) injection 10 mg  -     pembrolizumab (Keytruda) 200 mg in sodium chloride 0.9% 100 mL IV  -     sodium chloride 0.9 % bolus 500 mL  -     dextrose 5 % in water (D5W) bolus  -     diphenhydrAMINE (BENADryl) injection 50 mg  -     methylPREDNISolone sod succinate (PF) (SOLU-Medrol) 40 mg/mL injection 40 mg  -     famotidine PF (Pepcid) injection 20 mg  -     EPINEPHrine (Epipen) injection syringe 0.3 mg  -     albuterol 2.5 mg /3 mL (0.083 %) nebulizer solution 3 mL             Tanisha Cao MD

## 2023-10-19 ENCOUNTER — TELEPHONE (OUTPATIENT)
Dept: HEMATOLOGY/ONCOLOGY | Facility: CLINIC | Age: 68
End: 2023-10-19
Payer: MEDICARE

## 2023-10-23 ENCOUNTER — TELEPHONE (OUTPATIENT)
Dept: HEMATOLOGY/ONCOLOGY | Facility: CLINIC | Age: 68
End: 2023-10-23
Payer: MEDICARE

## 2023-10-23 DIAGNOSIS — C34.90 ADENOCARCINOMA OF LUNG, UNSPECIFIED LATERALITY (MULTI): ICD-10-CM

## 2023-10-23 DIAGNOSIS — R91.1 LUNG NODULE: Primary | ICD-10-CM

## 2023-10-24 ASSESSMENT — PATIENT HEALTH QUESTIONNAIRE - PHQ9
1. LITTLE INTEREST OR PLEASURE IN DOING THINGS: MORE THAN HALF THE DAYS
9. THOUGHTS THAT YOU WOULD BE BETTER OFF DEAD, OR OF HURTING YOURSELF: NOT AT ALL
4. FEELING TIRED OR HAVING LITTLE ENERGY: 2
6. FEELING BAD ABOUT YOURSELF - OR THAT YOU ARE A FAILURE OR HAVE LET YOURSELF OR YOUR FAMILY DOWN: MORE THAN HALF THE DAYS
4. FEELING TIRED OR HAVING LITTLE ENERGY: MORE THAN HALF THE DAYS
10. IF YOU CHECKED OFF ANY PROBLEMS, HOW DIFFICULT HAVE THESE PROBLEMS MADE IT FOR YOU TO DO YOUR WORK, TAKE CARE OF THINGS AT HOME, OR GET ALONG WITH OTHER PEOPLE: SOMEWHAT DIFFICULT
5. POOR APPETITE OR OVEREATING: SEVERAL DAYS
7. TROUBLE CONCENTRATING ON THINGS, SUCH AS READING THE NEWSPAPER OR WATCHING TELEVISION: SEVERAL DAYS
1. LITTLE INTEREST OR PLEASURE IN DOING THINGS: MORE THAN HALF THE DAYS
8. MOVING OR SPEAKING SO SLOWLY THAT OTHER PEOPLE COULD HAVE NOTICED. OR THE OPPOSITE, BEING SO FIGETY OR RESTLESS THAT YOU HAVE BEEN MOVING AROUND A LOT MORE THAN USUAL: NOT AT ALL
9. THOUGHTS THAT YOU WOULD BE BETTER OFF DEAD, OR OF HURTING YOURSELF: 0
6. FEELING BAD ABOUT YOURSELF - OR THAT YOU ARE A FAILURE OR HAVE LET YOURSELF OR YOUR FAMILY DOWN: 2
6. FEELING BAD ABOUT YOURSELF - OR THAT YOU ARE A FAILURE OR HAVE LET YOURSELF OR YOUR FAMILY DOWN: MORE THAN HALF THE DAYS
8. MOVING OR SPEAKING SO SLOWLY THAT OTHER PEOPLE COULD HAVE NOTICED. OR THE OPPOSITE, BEING SO FIGETY OR RESTLESS THAT YOU HAVE BEEN MOVING AROUND A LOT MORE THAN USUAL: NOT AT ALL
9. THOUGHTS THAT YOU WOULD BE BETTER OFF DEAD, OR OF HURTING YOURSELF: NOT AT ALL
3. TROUBLE FALLING OR STAYING ASLEEP OR SLEEPING TOO MUCH: SEVERAL DAYS
5. POOR APPETITE OR OVEREATING: SEVERAL DAYS
2. FEELING DOWN, DEPRESSED, IRRITABLE, OR HOPELESS: MORE THAN HALF THE DAYS
7. TROUBLE CONCENTRATING ON THINGS, SUCH AS READING THE NEWSPAPER OR WATCHING TELEVISION: 1
7. TROUBLE CONCENTRATING ON THINGS, SUCH AS READING THE NEWSPAPER OR WATCHING TELEVISION: SEVERAL DAYS
3. TROUBLE FALLING OR STAYING ASLEEP OR SLEEPING TOO MUCH: SEVERAL DAYS
2. FEELING DOWN, DEPRESSED OR HOPELESS: MORE THAN HALF THE DAYS
3. TROUBLE FALLING OR STAYING ASLEEP OR SLEEPING TOO MUCH: 1
SUM OF ALL RESPONSES TO PHQ QUESTIONS 1-9: 11
2. FEELING DOWN, DEPRESSED, IRRITABLE, OR HOPELESS: 2
4. FEELING TIRED OR HAVING LITTLE ENERGY: MORE THAN HALF THE DAYS
8. MOVING OR SPEAKING SO SLOWLY THAT OTHER PEOPLE COULD HAVE NOTICED. OR THE OPPOSITE, BEING SO FIGETY OR RESTLESS THAT YOU HAVE BEEN MOVING AROUND A LOT MORE THAN USUAL: 0
5. POOR APPETITE OR OVEREATING: 1
10. IF YOU CHECKED OFF ANY PROBLEMS, HOW DIFFICULT HAVE THESE PROBLEMS MADE IT FOR YOU TO DO YOUR WORK, TAKE CARE OF THINGS AT HOME, OR GET ALONG WITH OTHER PEOPLE: SOMEWHAT DIFFICULT
1. LITTLE INTEREST OR PLEASURE IN DOING THINGS: 2
SUM OF ALL RESPONSES TO PHQ QUESTIONS 1-9: 11

## 2023-10-25 ENCOUNTER — OFFICE VISIT (OUTPATIENT)
Dept: HEMATOLOGY/ONCOLOGY | Facility: CLINIC | Age: 68
End: 2023-10-25
Payer: MEDICARE

## 2023-10-25 VITALS
WEIGHT: 151.24 LBS | HEART RATE: 94 BPM | OXYGEN SATURATION: 100 % | DIASTOLIC BLOOD PRESSURE: 71 MMHG | BODY MASS INDEX: 27.48 KG/M2 | TEMPERATURE: 96.4 F | SYSTOLIC BLOOD PRESSURE: 106 MMHG | RESPIRATION RATE: 20 BRPM

## 2023-10-25 DIAGNOSIS — C34.90 ADENOCARCINOMA OF LUNG, UNSPECIFIED LATERALITY (MULTI): ICD-10-CM

## 2023-10-25 DIAGNOSIS — R91.1 LUNG NODULE: ICD-10-CM

## 2023-10-25 DIAGNOSIS — C34.91: Primary | ICD-10-CM

## 2023-10-25 DIAGNOSIS — Z99.81 DEPENDENCE ON CONTINUOUS SUPPLEMENTAL OXYGEN: ICD-10-CM

## 2023-10-25 DIAGNOSIS — Z90.2 STATUS POST LOBECTOMY OF LUNG: ICD-10-CM

## 2023-10-25 PROCEDURE — 99215 OFFICE O/P EST HI 40 MIN: CPT | Performed by: STUDENT IN AN ORGANIZED HEALTH CARE EDUCATION/TRAINING PROGRAM

## 2023-10-25 PROCEDURE — 1160F RVW MEDS BY RX/DR IN RCRD: CPT | Performed by: STUDENT IN AN ORGANIZED HEALTH CARE EDUCATION/TRAINING PROGRAM

## 2023-10-25 PROCEDURE — 1125F AMNT PAIN NOTED PAIN PRSNT: CPT | Performed by: STUDENT IN AN ORGANIZED HEALTH CARE EDUCATION/TRAINING PROGRAM

## 2023-10-25 PROCEDURE — 3008F BODY MASS INDEX DOCD: CPT | Performed by: STUDENT IN AN ORGANIZED HEALTH CARE EDUCATION/TRAINING PROGRAM

## 2023-10-25 PROCEDURE — 1036F TOBACCO NON-USER: CPT | Performed by: STUDENT IN AN ORGANIZED HEALTH CARE EDUCATION/TRAINING PROGRAM

## 2023-10-25 PROCEDURE — 99205 OFFICE O/P NEW HI 60 MIN: CPT | Performed by: STUDENT IN AN ORGANIZED HEALTH CARE EDUCATION/TRAINING PROGRAM

## 2023-10-25 PROCEDURE — 1159F MED LIST DOCD IN RCRD: CPT | Performed by: STUDENT IN AN ORGANIZED HEALTH CARE EDUCATION/TRAINING PROGRAM

## 2023-10-25 ASSESSMENT — COLUMBIA-SUICIDE SEVERITY RATING SCALE - C-SSRS
2. HAVE YOU ACTUALLY HAD ANY THOUGHTS OF KILLING YOURSELF?: NO
6. HAVE YOU EVER DONE ANYTHING, STARTED TO DO ANYTHING, OR PREPARED TO DO ANYTHING TO END YOUR LIFE?: NO
1. IN THE PAST MONTH, HAVE YOU WISHED YOU WERE DEAD OR WISHED YOU COULD GO TO SLEEP AND NOT WAKE UP?: NO

## 2023-10-25 ASSESSMENT — PAIN SCALES - GENERAL: PAINLEVEL: 5

## 2023-10-25 ASSESSMENT — ENCOUNTER SYMPTOMS
LOSS OF SENSATION IN FEET: 0
DEPRESSION: 0
OCCASIONAL FEELINGS OF UNSTEADINESS: 0

## 2023-10-25 NOTE — PROGRESS NOTES
"[facility]   Thoracic Oncology New Patient Visit - [place]     Patient ID: Fatmata Plummer is a 68 y.o. female.  Primary Care Provider: Soren Covarrubias DO      Diagnosis: Stage III adenocarcinoma of lung, right (CMS/HCC) [C34.91]      Cancer Staging   Adenocarcinoma of lung (CMS/HCC)  Staging form: Lung, AJCC 8th Edition  - Pathologic stage from 9/13/2023: Stage IIIA (pT1b, pN2, cM0) - Signed by Olivia Escamilla MD on 11/2/2023     Molecular Studies    PDL 1  5%   NGS KRAS G12c     Oncologic History:  2009 - Stage IA Nodular Sclerosis Hodgkin Lymphoma s/p 4 cycles ABVD and IFRT. Treated by Dr. Mark Brar.    4/2023 - Right Lower Lobe lung nodule noted on lung cancer screening CT scan, growing on follow-up CT 3 months later     9/13/23 Right lower lobe wedge resection by Dr. Tammie SANDRA   Chief Concern: \"Does she need chemotherapy? She's very weak\"    Interval History: Patient presents in a wheelchair accompanied by her . She is noticeably tired, reports she did not sleep much last night due to anxiety about the visit. She listens and participates, but her  does most of the talking.    They are aware of the lung cancer diagnosis which was reviewed by Dr. Cho and by Dr. Cao. They understood from Dr. Cho that adjuvant therapy (chemo) could be considered, but would have to be weight against her other co-morbidities. They come today for a second opinion.    Prior to surgery she was able to do ADLs in the home and accompany her  for grocery shopping. Since the surgery, she has great difficulty with activity, she feels winded even with short walks inside the house and needs help with dressing and bathing.     PMH: Mrs. Plummer has severe COPD, follows with Pulmonary and requires continuous use of oxygen, 4L/min.    I have personally reviewed PMH, PSH, Family History in the HISTORY section of this chart, and unless noted above are not pertinent to the presenting " complaint.  I have personally reviewed Social History as provided in the electronic medical record.    Review of Systems - 10 systems reviewed and negative unless noted.  Pertinent Positive: fatigue, dyspnea with exertion    Pertinent Negatives:  nausea  vomiting  diarrhea  constipation  difficulty swallowing  headache, vision change, hearing change, tinnitus,   chest pain,   abdominal pain,   numbness/tingling in fingers or toes  fevers, chills, sick contacts.  insomnia, mood changes    Meds (Current):  Current Outpatient Medications   Medication Instructions    acetaminophen-codeine (Tylenol w/ Codeine #3) 300-30 mg tablet 1 tablet, oral, Every 6 hours PRN    albuterol 2.5 mg, nebulization, Every 4 hours PRN    atogepant (Qulipta) 30 mg tablet 1 tablet, oral, Daily    atorvastatin (LIPITOR) 20 mg, oral, Daily    Breztri Aerosphere 160-9-4.8 mcg/actuation HFA aerosol inhaler 2 puffs, inhalation, 2 times daily RT    cyanocobalamin (VITAMIN B-12) 1,000 mcg, intramuscular, Every 30 days    DULoxetine (Cymbalta) 60 mg DR capsule 1 capsule, Every 24 hours    fluticasone propion-salmeteroL (Advair Diskus) 250-50 mcg/dose diskus inhaler 1 puff, inhalation, 2 times daily RT    folic acid (FOLVITE) 1 mg, oral, Daily    gabapentin (Neurontin) 300 mg capsule 3 capsules, oral, 2 times daily    hydrOXYzine pamoate (VISTARIL) 25 mg, oral, 2 times daily    levothyroxine (SYNTHROID, LEVOXYL) 25 mcg, oral, Daily before breakfast    LORazepam (ATIVAN) 1.5 mg, oral, 2 times daily,      promethazine (PHENERGAN) 25 mg, oral, Every 12 hours    rOPINIRole (REQUIP) 1 mg, oral, Nightly    sulfamethoxazole-trimethoprim (Bactrim DS) 800-160 mg tablet 1 tablet, oral, 2 times daily    tiZANidine (Zanaflex) 4 mg tablet  1 tablet as needed Orally Three times a day       Allergies   Allergen Reactions    Bupropion Other and Unknown    Buspirone Other    Cilostazol Unknown    Pseudoephedrine Other and Unknown    Nsaids (Non-Steroidal  Anti-Inflammatory Drug) Palpitations       Objective   BSA: 1.74 meters squared  /71 (BP Location: Left arm, Patient Position: Sitting)   Pulse 94   Temp 35.8 °C (96.4 °F) (Temporal)   Resp 20   Wt 68.6 kg (151 lb 3.8 oz)   SpO2 100%   BMI 27.48 kg/m²     Visit Vitals  /71 (BP Location: Left arm, Patient Position: Sitting)   Pulse 94   Temp 35.8 °C (96.4 °F) (Temporal)   Resp 20   Wt 68.6 kg (151 lb 3.8 oz)   SpO2 100%   BMI 27.48 kg/m²   Smoking Status Former   BSA 1.74 m²        Performance Status:  (3) Capable of limited self-care, confined to bed or chair > 50% of waking hours     Physical Exam    General: Pleasant woman, appears stated age, well-groomed in street clothes,  Awake and oriented, but very fatigued, kept eyes closed most of visit.  Head: Hair present, fully covering scalp. Symmetric facial expressions  Eyes: PERRL, EOMI, clear sclera, eyebrows present.  Ears/Nose/Mouth/Throat:  Oral mucous membranes moist. No oral ulcers. No palpable pre/post-auricular lymph nodes  Neck: No palpable cervical chain lymph nodes  Respiratory: nasal cannula in place, oxygen flow 4L/min. Patient with pursed-lip breathing, tires with conversation. Good air movement in all lung fields with expiratory rhonchi noted.  Cardio: Regular rate and rhythm, normal S1 and S2, radial pulses symmetric  GI: Nondistended, soft, non-tender abdomen  Musculoskeletal: Normal muscle bulk and tone, ROM intact, no joint swelling.  Seated in wheelchair, feels too weak to stand/walk.  Extremities: No ankle swelling, no arm or leg wounds, clubbing on nails  Neuro: Alert, cognition intact, speech normal. Facial expressions symmetric.  No motor deficits noted. Sensation intact to touch and hot/cold.   Moves arms and legs against resistance while seated in wheelchair. Does not feel strong enough to stand or walk.  Psychological: Appropriate mood and behavior.  Skin: Warm and dry, no lesions, no rashes    Results:  Labs:  Lab  Results   Component Value Date    WBC 10.2 09/17/2023    HGB 13.6 09/17/2023    HCT 43.1 09/17/2023    MCV 91 09/17/2023     09/17/2023      Lab Results   Component Value Date    NEUTROABS 4.76 09/01/2022      Lab Results   Component Value Date    GLUCOSE 99 09/17/2023    CALCIUM 9.4 09/17/2023     09/17/2023    K 3.8 09/17/2023    CO2 34 (H) 09/17/2023    CL 93 (L) 09/17/2023    BUN 10 09/17/2023    CREATININE 0.84 09/17/2023     Lab Results   Component Value Date    ALT 28 09/01/2022    AST 22 09/01/2022    ALKPHOS 73 09/01/2022    BILITOT 0.4 09/01/2022        Imaging:  I have personally reviewed the below imaging and concur with the reported findings unless otherwise stated:     8/14/2023 PET CT  IMPRESSION:  1. Hypermetabolic right lower lobe nodule is highly concerning for  malignancy. Recommend tissue biopsy for further evaluation.  2. No evidence of hypermetabolic lymphadenopathy or metastatic disease.        Pathology:  Accession #: [id number]            Pathologist:                   KAITLIN PANDEY MD  Date of Procedure:    9/13/2023  Date Received:          9/13/2023  Date Reported           9/20/2023  Submitting Physician:   BROOKE FARNSWORTH MD  Location:                    [facility]  Other External #    Procedures/Addenda Present  FINAL DIAGNOSIS  A.  RIGHT LUNG, LOWER LOBE, PULMONARY WEDGE RESECTION:  -- INVASIVE ACINAR ADENOCARCINOMA (1.8 CM) INVOLVING LUNG PARENCHYMA; SEE NOTE AND SYNOPTIC REPORT.    NOTE: Additional immunostain for PD-L1 and next generation sequencing (NGS) will be performed and reported in addenda.    B.  LEVEL 8 LYMPH NODE, EXCISION:  -- ONE (1) LYMPH NODE NEGATIVE FOR TUMOR.    C.  LEVEL 9 LYMPH NODE, EXCISION:  -- ONE (1) LYMPH NODE NEGATIVE FOR TUMOR.    D.  LEVEL 7 LYMPH NODE, EXCISION:  -- METASTATIC CARCINOMA TO FRAGMENTS OF LYMPH NODE(S).    E.  LEVEL 10 LYMPH NODE, EXCISION:  -- FRAGMENTS OF LYMPH NODE(S) NEGATIVE FOR TUMOR.    F.  LEVEL 4R LYMPH  NODE, EXCISION:  -- FRAGMENTS OF LYMPH NODE(S) NEGATIVE FOR TUMOR.    CASE SUMMARY REPORT       SPECIMEN  Procedure:      Wedge resection  Specimen Laterality:      Right  TUMOR  Tumor Focality:      Single focus  Tumor Site:      Lower lobe of lung     Tumor Size     Total Tumor Size (size of entire tumor):      Greatest Dimension  (Centimeters): 1.8 cm  Histologic Type:      Invasive acinar adenocarcinoma  Visceral Pleura Invasion:      Not identified  Direct Invasion of Adjacent Structures:      Not applicable (no adjacent  structures present)  Treatment Effect:      No known presurgical therapy  Lymphovascular Invasion:      Not identified  MARGINS  Margin Status for Invasive Carcinoma:      All margins negative for invasive  carcinoma   Closest Margin(s) to Invasive Carcinoma:        Parenchymal  Margin Status for Non-Invasive Tumor:      Not applicable  REGIONAL LYMPH NODES  Lymph Node(s) from Prior Procedures:      No known prior lymph node sampling  performed  Regional Lymph Node Status:        Tumor present in regional lymph node(s)     Number of Lymph Nodes with Tumor:          At least: 1     Kassidy Site(s) with Tumor:          7: Subcarinal   Number of Lymph Nodes Examined:        At least: 5    Kassidy Site(s) Examined:         4R: Lower paratracheal          8R: Para-esophageal (below amarilys)          9R: Pulmonary ligament          10R: Hilar          7: Subcarinal  PATHOLOGIC STAGE CLASSIFICATION (pTNM, AJCC 8th Edition)  pT Category:      pT1b  pN Category:      pN2  Representative Blocks:      Normal Block: A8       Tumor Block: A3     Addendum Diagnosis   PD-L1 22C3  by Immunohistochemistry with Interpretation, pembrolizumab   Block used:  A3   Interpretation: Low Expression   Tumor Proportion Score (TPS): 5 %     Addendum Diagnosis   TEST: Focused Solid Tumor DNA/RNA Panel   SPECIMEN: FFPE, LUNG, RIGHT LOWER LOBE, [id number] A3   DISEASE DIAGNOSIS: Adenocarcinoma   Estimated Tumor Content: 40%    COLLECTION DATE: 9/13/2023   RECEIVED DATE: 9/22/2023   REPORT DATE: 09/27/2023     MICROSATELLITE STATUS: Microsatellite Instability-High (MSI-H) is NOT DETECTED.     DISEASE ASSOCIATED GENOMIC FINDINGS:   KRAS p.G12C (NM_033360 c.34G>T)   TP53 p.R249W (NM_000546 c.745A>T)     Surgical Pathology (reviewed in [facility])  Accession #: [id number]  Date of Procedure:  6/25/2009  Pathologist:  JULIO ESPINOSAate Reported: 6/25/2009  Submitting Physician: JAILYN MCKENZIE D.O.    FINAL DIAGNOSIS  A.  RIGHT AXILLARY LYMPH NODE, [id number] (6/3/09):HODGKIN'S LYMPHOMA NODULAR SCLEROSIS TYPE.    Microscopic Description: Sections show lymph node with partial involvement by Hodgkin's Lymphoma.  Thereare syncytia of Sacha-Andres cells in an appropriate mixed cellularbackground and focal sclerosis. Immunostain for LCA, fascin, CD15, CD20, CD30, and CD3 are consistent withHodgkin's Lymphoma.      Assessment/Plan      Fatmata Plummer is a 68 y.o. female with prior stage IA Hodgkin lymphoma (R axilla) treated in 2009 by Dr. Mckenzie, severe COPD requiring oxygen ,and recent diagnosis of pT1bN2 adenocarcinoma of the Right lower lobe, surgically resected 9/13/2023 by Dr. Cho.     I reviewed the pathology report, PET scan images, and discussed the natural history and prognosis of Non-small cell lung cancer.    She has Stage IIIA lung cancer, which has a recurrence risk of about 50% over 5 years. Adjuvant chemotherapy and immunotherapy is typically recommended to decrease the risk of recurrence.    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 Furthermore, she is very debilitated from her underlying COPD and the recent surgery, with a performance status of ECOG 3.  She spends most of her time in bed/couch and requires help for dressing, bathing, and most ADLs.      I explained to patient and her  that chemotherapy would likely cause more harm given her debilitated  condition.    Although Pembrolizumab is FDA approved in the adjuvant setting, I do not recommend adjuvant immunotherapy in patients who cannot receieve adjuvant chemotherapy.   In the PEARLS/KEYNOTE-091 trial, adjuvant chemotherapy was strongly encouraged for patients with Stage II and IIIA NSCLC, and comprised the majority of the trial. Although some patients who did not receive chemotherapy, these were a small group (about 15% of the trial population). Within these subgroups, the Disease Free Survival benefit of pembrolizumab favored those who DID receive adjuvant therapy.  Furthermore, she has low PDL1 expression, and patients with high PDL1 expression (>50%) benefited the most from adjuvant pembrolizumab.     For patients who cannot receive adjuvant therapy, imaging is recommended to monitor for disease recurrence with CT Chest every 3-6 months in the first 2 years, then yearly for a total of 5 years.   She also should have an MRI Brain for baseline staging.     Given her significant post-lobectomy debility, I also recommend pulmonary rehab which is offered at multiple  sites. She will benefit from a supervised exercise program to increase her stamina and mobility.    Patient and her  asked several questions, which I answered to their satisfaction.    Diagnoses and all orders for this visit:  Stage III adenocarcinoma of lung, right (CMS/HCC)  -     MR brain w and wo IV contrast; Future  -     Pulmonary rehab therapeutic exercise; Future  -     Clinic Appointment Request Follow Up; Future  Status post lobectomy of lung  -     Pulmonary rehab therapeutic exercise; Future      Return in 6 weeks after MRI Brain and CT chest    Time spent face to face with patient: 60 minutes     Olivia Escamilla MD  [facility]

## 2023-10-25 NOTE — PROGRESS NOTES
"Patient is here today with her . She is seeing Dr. Escamilla for a second opinion. Dr. Cho let patient know that additional treatment options should come from the oncologist as her COPD vs cancer needs to be weighed.     Upon arrival her BP was 83/54, she says that is normal for her. She has been on oxygen since 4/2022. Notes she's been on higher oxygen since surgery in September.    Her  states she is really tired this morning d/t bad night of sleep (she was worried about her visit today), otherwise she feels \"normal\". She does feel like it's \"more difficult to breath today...huffing and puffing\". Her  also notes that her stamina has decreased, especially notable during IADLs (grocery shopping).     Patient was enrolled in pulm rehab when she first was placed on oxygen in 2022, did not complete program. She is interested in an aquatic therapy rehab, if available.      PET scan, Personal Lung Cancer Profile, and potential treatment plan reviewed with patient and spouse by Dr. Escamilla.     Patient stated that she had chemo to treat her Hodgkin's Lymphoma (2008 - 2009) and was told by Dr. Brar that she can't have any more chemo. Neuropathy noted by patient in toes and fingers from previous chemotherapy. She is taking gabapentin for treatment.     Patient agrees that quality if life is more important to her than quantity of years. She has a goal of going to the beach one more time.     General plan of obtaining chest CT every 3-6 months for the first year. Patient will need to focus on regaining stamina - Dr. Escamilla recommends pulmonary rehab.     Plan for follow up visit after CT.   "

## 2023-10-25 NOTE — PATIENT INSTRUCTIONS
Brain MRI and chest CT within the next 3 months with follow up visit with Dr. Escamilla after testing is completed.     Enroll and start a pulmonary rehab program, can be at a local location.

## 2023-10-26 NOTE — TELEPHONE ENCOUNTER
Called and spoke to patient on 10/11/23, she stated that she had some additional questions regarding her cancer diagnosis and inquired about having second opinion. Spoke with Dr. Cao's team and pt saw Dr. Escamilla for second opinion.

## 2023-10-26 NOTE — TELEPHONE ENCOUNTER
----- Message from Fatmata Plummer sent at 10/11/2023  3:05 PM EDT -----  Regarding: Your Recent Visit  Contact: [phone]  [facility] Doctor Tammie,  I have a couple of questions I forgot to ask you.  Will please tell me what stage lung cancer I have and what type of lung cancer I have?  Is it fast growing  and is there another nodule in my left lung?  Thank you for your time.  Respectfully,  Fatmata Plummer   [phone]

## 2023-11-02 ENCOUNTER — APPOINTMENT (OUTPATIENT)
Dept: HEMATOLOGY/ONCOLOGY | Facility: CLINIC | Age: 68
End: 2023-11-02
Payer: MEDICARE

## 2023-11-03 ENCOUNTER — APPOINTMENT (OUTPATIENT)
Dept: VASCULAR MEDICINE | Facility: HOSPITAL | Age: 68
End: 2023-11-03
Payer: MEDICARE

## 2023-11-07 ENCOUNTER — APPOINTMENT (OUTPATIENT)
Dept: HEMATOLOGY/ONCOLOGY | Facility: CLINIC | Age: 68
End: 2023-11-07
Payer: MEDICARE

## 2023-11-10 ENCOUNTER — APPOINTMENT (OUTPATIENT)
Dept: VASCULAR SURGERY | Facility: HOSPITAL | Age: 68
End: 2023-11-10
Payer: MEDICARE

## 2023-11-13 ENCOUNTER — HOSPITAL ENCOUNTER (OUTPATIENT)
Dept: RADIOLOGY | Facility: HOSPITAL | Age: 68
Discharge: HOME | End: 2023-11-13
Payer: MEDICARE

## 2023-11-13 DIAGNOSIS — C34.31 MALIGNANT NEOPLASM OF LOWER LOBE OF RIGHT LUNG (MULTI): ICD-10-CM

## 2023-11-13 PROCEDURE — 71250 CT THORAX DX C-: CPT

## 2023-11-13 PROCEDURE — 71250 CT THORAX DX C-: CPT | Performed by: RADIOLOGY

## 2023-11-15 ENCOUNTER — TELEPHONE (OUTPATIENT)
Dept: HEMATOLOGY/ONCOLOGY | Facility: CLINIC | Age: 68
End: 2023-11-15
Payer: MEDICARE

## 2023-11-15 DIAGNOSIS — Z99.81 DEPENDENCE ON CONTINUOUS SUPPLEMENTAL OXYGEN: ICD-10-CM

## 2023-11-15 DIAGNOSIS — J44.9 ADVANCED COPD (MULTI): ICD-10-CM

## 2023-11-15 DIAGNOSIS — Z90.2 STATUS POST LOBECTOMY OF LUNG: Primary | ICD-10-CM

## 2023-11-15 NOTE — TELEPHONE ENCOUNTER
Phone call after clinic at 5:52pm    Reviewed results of CT chest with patient which shows expected changes from recent surgery. The slight increase in the paraesophageal node may be due to post-surgical changes and healing. No need for any procedures at this time, will follow-up with another scan in Feb/March 2024.    She is scheduled to start Pulmonary Rehab next Monday.    Also discussed MRI Brain scheduled for 11/28 prior to next visit. In the past she has needed medication to help with claustrophia. She takes lorazepam twice daily for anxiety. I advised her to take lorazepam 1mg tablet prior to the scan.     Olivia Escamilla MD

## 2023-11-20 ENCOUNTER — CLINICAL SUPPORT (OUTPATIENT)
Dept: CARDIAC REHAB | Facility: HOSPITAL | Age: 68
End: 2023-11-20
Payer: MEDICARE

## 2023-11-20 ENCOUNTER — APPOINTMENT (OUTPATIENT)
Dept: CARDIAC REHAB | Facility: HOSPITAL | Age: 68
End: 2023-11-20
Payer: MEDICARE

## 2023-11-20 DIAGNOSIS — J44.9 CHRONIC OBSTRUCTIVE PULMONARY DISEASE, UNSPECIFIED COPD TYPE (MULTI): Primary | ICD-10-CM

## 2023-11-20 PROCEDURE — 94625 PHY/QHP OP PULM RHB W/O MNTR: CPT | Performed by: INTERNAL MEDICINE

## 2023-11-24 ENCOUNTER — APPOINTMENT (OUTPATIENT)
Dept: CARDIAC REHAB | Facility: HOSPITAL | Age: 68
End: 2023-11-24
Payer: MEDICARE

## 2023-11-28 ENCOUNTER — APPOINTMENT (OUTPATIENT)
Dept: CARDIAC REHAB | Facility: HOSPITAL | Age: 68
End: 2023-11-28
Payer: MEDICARE

## 2023-11-28 ENCOUNTER — APPOINTMENT (OUTPATIENT)
Dept: HEMATOLOGY/ONCOLOGY | Facility: CLINIC | Age: 68
End: 2023-11-28
Payer: MEDICARE

## 2023-11-28 ENCOUNTER — HOSPITAL ENCOUNTER (OUTPATIENT)
Dept: RADIOLOGY | Facility: HOSPITAL | Age: 68
Discharge: HOME | End: 2023-11-28
Payer: MEDICARE

## 2023-11-28 DIAGNOSIS — C34.91: ICD-10-CM

## 2023-11-28 PROCEDURE — 2550000001 HC RX 255 CONTRASTS: Performed by: STUDENT IN AN ORGANIZED HEALTH CARE EDUCATION/TRAINING PROGRAM

## 2023-11-28 PROCEDURE — 70553 MRI BRAIN STEM W/O & W/DYE: CPT | Performed by: RADIOLOGY

## 2023-11-28 PROCEDURE — A9575 INJ GADOTERATE MEGLUMI 0.1ML: HCPCS | Performed by: STUDENT IN AN ORGANIZED HEALTH CARE EDUCATION/TRAINING PROGRAM

## 2023-11-28 PROCEDURE — 70553 MRI BRAIN STEM W/O & W/DYE: CPT

## 2023-11-28 RX ORDER — GADOTERATE MEGLUMINE 376.9 MG/ML
0.2 INJECTION INTRAVENOUS
Status: COMPLETED | OUTPATIENT
Start: 2023-11-28 | End: 2023-11-28

## 2023-11-28 RX ADMIN — GADOTERATE MEGLUMINE 14 ML: 376.9 INJECTION INTRAVENOUS at 11:00

## 2023-11-30 ENCOUNTER — CLINICAL SUPPORT (OUTPATIENT)
Dept: CARDIAC REHAB | Facility: HOSPITAL | Age: 68
End: 2023-11-30
Payer: MEDICARE

## 2023-11-30 DIAGNOSIS — J44.9 CHRONIC OBSTRUCTIVE PULMONARY DISEASE, UNSPECIFIED COPD TYPE (MULTI): Primary | ICD-10-CM

## 2023-11-30 PROCEDURE — 94625 PHY/QHP OP PULM RHB W/O MNTR: CPT | Performed by: INTERNAL MEDICINE

## 2023-11-30 NOTE — PROGRESS NOTES
Patient: Fatmata Plummer    [id number]  : 1955 -- AGE 68 y.o.    Provider: ANKIT Bassett-CNP     [facility]   Service Date: 2023         Department of Medicine  Division of Pulmonary, Critical Care, and Sleep Medicine         [facility]  Follow Up Visit Note        HISTORY OF PRESENT ILLNESS     PCP: Dr. Brand  Sleep: Randal Garland PA-C  Medical Oncology: Dr. Olivia Escamilla    HISTORY OF PRESENT ILLNESS   Fatmata Plummer is a 68 y.o. female who presents to a [facility] for a follow up visit with concerns of COPD (chronic obstructive pulmonary disease).   I have independently interviewed and examined the patient in the office and reviewed available records.    DATE OF LAST VISIT: 2023  Since last visit, patient has been diagnosed with non-small cell lung cancer.  Underwent a right lower lobe wedge resection on 2023 confirming the diagnosis.  She is now being followed by both thoracic surgery as well as medical oncology; last saw Dr. Escamilla on 10/25/2023.  Per Dr. Escamilla's last note she has stage IIIa lung cancer and was advised pulmonary rehab due to some significant post wedge resection debility.  Per Dr. Cho note from 10/10/2023; he is planning on seeing her back in 6 months with a repeat CT of her chest at that time.  According to chart review, it appears patient started pulmonary rehab on 2023 (previously ordered by Dr. Escamilla).    Current History  On today's visit, She reports that ever since having her lung surgery on 2023, she feels like her shortness of breath has worsened.  She also is noticing some intermittent wheezing and she is now on 4 L of supplemental oxygen at all times.  At my last visit with her I had switched her over to Breztri from Advair discus; she was able to get the Breztri however she sparingly ever uses it.  Maybe a couple times a month on  "average per patient.  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  States that her previous Advair discus powdered inhaler did not seem to bother her mouth as bad.  The albuterol nebulizers and inhalers that she has she also uses sparingly due to mouth pain.     Essentially at this time, she has severe to very severe COPD and is not on any treatment at all; all secondary to burning mouth syndrome.  Due to the fact that her previous powdered Advair was less of a painful experience versus an aerosolized Breztri inhaler; my plan is to get her moved over to powdered Trelegy as triple inhaler therapy.  Dosing will only be once a day and patient seems encouraged that she would be able to tolerate this.  Stressed the importance of needing daily maintenance therapy for management of her COPD; especially after undergoing lung surgery and dealing with lung cancer.     CAT SCORE TODAY: 25  Prior Visits & History   5/8/23: Since last visit on 10/19/22 with Dr. Guerra, patient completed lung cancer screening CT that was previously ordered. Reviewed those results with her today. [facility] Chest CT scan from 4/27/23 showed a 7mm RLL lung nodule, mild to moderate upper lobe emphysema and some mild subpleural reticulations; suggestive sequela of smoking. Lung RADS 4A. Also recently completed an echocardiogram on 12/13/22 which showed preserved EF of 65-70% with LV diastolic dysfunction. Trace tricuspid and mitral regurg. No sign of pulmonary HTN. Last visit with Dr. Guerra, she was prescribed Incruse to pair with her Advair 250. However, appears like she never got the Incruse; states insurance did not cover it. Also is only using her Advair PRN due to \"burning mouth syndrome\". Using it maybe 3x a month currently. Has albuterol nebulizer and inhaler; using those treatments maybe 1-2x a week. She is on 3L of supplemental oxygen constantly. Has been on the oxygen for a little over " a year. Wears at night also. She admits to an infrequent cough that is non productive. Admits to frequent wheezing. Denies SOB at rest. Does have BOTELLO; MMRC 3. Denies allergy issues. Denies GERD. Denies chest pain. Denies LLE but does have +orthopnea. Admits to snoring and  has noted abnormal breathing patterns. Has daytime fatigue and will often doze off easily. ESS today was 13.  ACT Today: 15  CAT Today: 28     Medical Hx  -COPD  -Chronic Hypoxic Respiratory Failure  -Hx of Hodgkins Lymphoma (2008) s/p lumpectomy right axilla and chemo/radiation right axilla (Dr. Brar)  -HLD  -B12 deficiency  -Night Terrors  -Burning Mouth Syndrome    ** The below are notes from previous visits with Dr. 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 has albuterol prn. Did have breathing treatments in the hospital which did not cause this issue      Denies asthma as a kid     Denies waking up at night short of breath      c/o hoarse voice      Last PFT in 2015--mod obstruction      PMH/PSH: subclavian artery stenosis, lymphoma --s/p chemo/rads in 2015,   FH : adopted so unsure of family hx   SH : quit smoking in 2014, was smoking 1 ppd for 30 years. Used to work in a school as an aide. pets at home   REVIEW OF SYSTEMS     REVIEW OF  SYSTEMS  Review of Systems   Constitutional:  Positive for appetite change and fatigue. Negative for activity change, chills, fever and unexpected weight change.        Claims night sweats   HENT:  Negative for congestion, postnasal drip, rhinorrhea, sinus pressure, sinus pain, sneezing, sore throat, trouble swallowing and voice change.    Eyes:  Negative for redness and itching.   Respiratory:  Positive for shortness of breath and wheezing. Negative for cough, chest tightness and stridor.    Cardiovascular:  Negative for chest pain, palpitations and leg swelling.        Denies orthopnea   Gastrointestinal:  Positive for abdominal pain and nausea. Negative for diarrhea and vomiting.        Denies acid reflux   Musculoskeletal:  Negative for arthralgias, back pain, joint swelling and myalgias.   Skin:  Negative for rash.   Allergic/Immunologic: Negative for immunocompromised state.   Neurological:  Negative for dizziness, tremors, weakness and headaches.   Hematological:  Does not bruise/bleed easily.   Psychiatric/Behavioral:  Negative for agitation and sleep disturbance. The patient is nervous/anxious.         Claims depression   All other systems reviewed and are negative.        ALLERGIES AND MEDICATIONS     ALLERGIES  Allergies   Allergen Reactions    Bupropion Other and Unknown    Buspirone Other    Cilostazol Unknown    Latex Other     BLISTER    Pseudoephedrine Other and Unknown    Nsaids (Non-Steroidal Anti-Inflammatory Drug) Palpitations       MEDICATIONS  Current Outpatient Medications   Medication Sig Dispense Refill    acetaminophen-codeine (Tylenol w/ Codeine #3) 300-30 mg tablet Take 1 tablet by mouth every 6 hours if needed.      albuterol 2.5 mg /3 mL (0.083 %) nebulizer solution Take 3 mL (2.5 mg) by nebulization every 4 hours if needed for wheezing.      albuterol 90 mcg/actuation aerosol [facility] breath activated inhaler Inhale 2 puffs every 6 hours if needed for wheezing or shortness of breath.       atorvastatin (Lipitor) 20 mg tablet Take 1 tablet (20 mg) by mouth once daily.      Breztri Aerosphere 160-9-4.8 mcg/actuation HFA aerosol inhaler Inhale 2 puffs 2 times a day.      cyanocobalamin (Vitamin B-12) 1,000 mcg/mL injection INJECT 1 ML INTRAMUSCULARLY ONCE EVERY MONTH. 3 mL 13    DULoxetine (Cymbalta) 60 mg DR capsule 1 capsule (60 mg) once every 24 hours.      folic acid (Folvite) 1 mg tablet Take 1 tablet (1 mg) by mouth once daily.      gabapentin (Neurontin) 300 mg capsule Take 3 capsules (900 mg) by mouth 2 times a day.      levothyroxine (Synthroid, Levoxyl) 25 mcg tablet Take 1 tablet (25 mcg) by mouth once daily in the morning. Take before meals.      LORazepam (Ativan) 1 mg tablet Take 1.5 tablets (1.5 mg) by mouth 2 times a day.      promethazine (Phenergan) 25 mg tablet Take 1 tablet (25 mg) by mouth every 12 hours.      rOPINIRole (Requip) 1 mg tablet Take 1 tablet (1 mg) by mouth once daily at bedtime.      tiZANidine (Zanaflex) 4 mg tablet 1 tablet as needed Orally Three times a day      topiramate (Topamax) 100 mg tablet Take 1 tablet (100 mg) by mouth once daily.       No current facility-administered medications for this visit.       PAST HISTORY     PAST MEDICAL HISTORY  She  has a past medical history of Age-related nuclear cataract, bilateral (12/07/2015), Age-related nuclear cataract, right eye (04/01/2016), Aphakia, left eye (01/21/2016), COPD (chronic obstructive pulmonary disease) (CMS/[facility]), Cortical age-related cataract, bilateral (12/07/2015), Hodgkin lymphoma, unspecified, unspecified site (CMS/HCC) (12/04/2013), Hypermetropia, bilateral (04/01/2016), Other chest pain (07/16/2021), Other conditions influencing health status (12/07/2015), Other postherpetic nervous system involvement (07/16/2021), Personal history of other diseases of the respiratory system, Personal history of other infectious and parasitic diseases (07/16/2021), Personal history of pneumonia (recurrent)  "(08/07/2014), Posterior subcapsular polar age-related cataract, right eye (04/01/2016), and Presence of intraocular lens (01/21/2016).    PAST SURGICAL HISTORY  Past Surgical History:   Procedure Laterality Date    CATARACT EXTRACTION  02/24/2016    Cataract Surgery    HYSTERECTOMY  09/18/2014    Hysterectomy    STRABISMUS SURGERY  12/07/2015    Strabismus Surgery       IMMUNIZATION HISTORY  Immunization History   Administered Date(s) Administered    Flu vaccine (IIV4), preservative free *Check age/dose* 11/02/2016, 09/25/2018, 10/04/2019, 10/07/2021    Flu vaccine, quadrivalent, high-dose, preservative free, age 65y+ (FLUZONE) 10/05/2022, 09/26/2023    Influenza, injectable, MDCK, preservative free, quadrivalent 10/05/2020    Influenza, injectable, quadrivalent 10/20/2017    Influenza, seasonal, injectable 11/01/2012, 10/02/2015    Influenza, seasonal, injectable, preservative free 09/30/2013    Moderna COVID-19 vaccine, bivalent, blue cap/gray label *Check age/dose* 11/09/2022    Moderna SARS-CoV-2 Vaccination 03/09/2021, 04/20/2021, 12/06/2021    Novel influenza-H1N1-09, preservative-free 12/22/2009    Pneumococcal conjugate vaccine, 20-valent (PREVNAR 20) 09/26/2023       SOCIAL HISTORY  She  reports that she quit smoking about 9 years ago. Her smoking use included cigarettes. She started smoking about 46 years ago. She has a 37.00 pack-year smoking history. She has never been exposed to tobacco smoke. She has never used smokeless tobacco. She reports that she does not currently use alcohol. She reports that she does not currently use drugs.     FAMILY HISTORY  Family History   Adopted: Yes       PHYSICAL EXAM     VITAL SIGNS: /64   Pulse 110   Ht 1.575 m (5' 2\")   Wt 67.1 kg (148 lb)   SpO2 92% Comment: ON 4 LPM O2  BMI 27.07 kg/m²      PREVIOUS WEIGHTS:  Wt Readings from Last 3 Encounters:   12/01/23 67.1 kg (148 lb)   11/28/23 68 kg (150 lb)   10/25/23 68.6 kg (151 lb 3.8 oz)       Physical " Exam  Vitals reviewed.   Constitutional:       General: She is not in acute distress.     Appearance: Normal appearance. She is not ill-appearing or toxic-appearing.   HENT:      Head: Normocephalic.      Nose: No rhinorrhea.   Cardiovascular:      Rate and Rhythm: Normal rate and regular rhythm.      Heart sounds: Normal heart sounds.   Pulmonary:      Effort: Pulmonary effort is normal. No respiratory distress.      Breath sounds: Normal breath sounds. No stridor.      Comments: Slightly diminished lung sounds all over; otherwise - clear.  Abdominal:      General: Abdomen is flat.   Musculoskeletal:         General: No swelling. Normal range of motion.   Skin:     General: Skin is warm and dry.      Nails: There is no clubbing.   Neurological:      General: No focal deficit present.      Mental Status: She is alert.   Psychiatric:         Mood and Affect: Mood normal.         Behavior: Behavior normal.         Judgment: Judgment normal.           RESULTS/DATA     Pulmonary Function Test Results   PFT  -22: FEV1/FVC: 49, FEV1: 0.91 (41%), FVC: 1.85 (64%), +BD response, KBW35-03: 15%, TLC: 3.69 (83%), DLCO: 31%  -4/14/15: FEV1/FVC: 49, FEV1: 1.49 (61%), FVC: 3.02 (97%), no BD response, NQA17-67: 17%, T.43 (98%), DLCO: 31%        Chest Radiograph     XR chest 2 views 10/10/2023    Narrative  Interpreted By:  Geoff Franklin,  STUDY:  XR CHEST 2 VIEWS;  10/10/2023 2:07 pm    INDICATION:  Signs/Symptoms:FU.    COMPARISON:  Chest radiograph 2023    ACCESSION NUMBER(S):  [id number]    ORDERING CLINICIAN:  BROOKE FARNSWORTH    FINDINGS:      CARDIOMEDIASTINAL SILHOUETTE:  Cardiomediastinal silhouette is unchanged.    LUNGS:  No consolidation, pneumothorax, or effusion. Flattened hemidiaphragms  bibasilar pleuroparenchymal thickening suggestive of COPD.    ABDOMEN:  No remarkable upper abdominal findings.    BONES:  No acute osseous changes.    Remaining findings appear stable since prior comparison  radiograph.    Impression  1.  No evidence of acute cardiopulmonary process.      Signed by: Geoff Franklin 10/11/2023 8:19 PM  Dictation workstation:   [id number]      Chest CT Scan   Lung CA Screening Chest CT  7/27/23: IMPRESSION: 1. There is a suspicious, growing superior segment right lower lobe lung nodule. Concerning bronchogenic carcinoma. Recommend PET-CT scanning and interventional pulmonology or thoracic surgery consultation. LUNG RADS CATEGORY: Lung-RADS 4X  -4/27/23: 1. There is a 7 mm right lower lobe nodule. Comparison to prior study is limited due to presence of motion and infiltrate on the prior study, however this nodule is likely new compared to prior study. Recommend short-term follow-up chest CT in 3 months. 2. Mild to moderate upper lung predominant emphysema. Subpleural reticulation in the lungs, likely sequela of smoking. 3. Mild coronary artery calcification. LUNG RADS CATEGORY: Lung-RADS 4A    Chest CT  11/13/23: IMPRESSION: 1. Status post wedge resection right lower lobe superior segment with postoperative changes present consisting of fibrotic stranding, mild bronchial dilatation in the lower lobe and pleural thickening. 2. Right paratracheal lymphadenopathy for which follow-up is recommended. Size of one right paratracheal lymph node has increased from previous examination. 3. Remaining mediastinal lymph nodes unchanged in size. 4. COPD.    PET CT  8/14/23: IMPRESSION: 1. Hypermetabolic right lower lobe nodule is highly concerning for malignancy. Recommend tissue biopsy for further evaluation. 2. No evidence of hypermetabolic lymphadenopathy or metastatic disease.  Echocardiogram & Cardiac Studies     Echocardiogram     [facility], [address] [place] [address]  Tel [phone] and Fax [phone]    TRANSTHORACIC ECHOCARDIOGRAM REPORT      Patient Name:     MARLASEDRICK JUNG  Reading Physician:   [id number] Sam Bolton MD  Study Date:       12/13/2022    Referring Physician: Cori Guerra  MRN/PID:          [id number]     PCP:  Accession/Order#: [id number] Department Location: [facility]  YOB: 1955    Fellow:  Gender:           F            Nurse:  Admit Date:                    Sonographer:         Gabrielle Frank [facility]  Admission Status: Outpatient   Additional Staff:  Height:           157.48 cm    CC Report to:        Cori Guerra MD  Weight:           70.31 kg     Study Type:          Echocardiogram  BSA:              1.72 m2  Blood Pressure: 126 /80 mmHg    Diagnosis/ICD: R06.00-Dyspnea, unspecified  Indication:    Dyspnea  Procedure/CPT: Echo Complete w Full [id number]    Patient History:  Smoker:            Former.  Pertinent History: COPD and Dyspnea.    Study Detail: The following Echo studies were performed: 2D, M-Mode, Doppler and  color flow.      PHYSICIAN INTERPRETATION:  Left Ventricle: The left ventricular systolic function is hyperdynamic, with an estimated ejection fraction of 65-70%. There are no regional wall motion abnormalities. The left ventricular cavity size is decreased. Spectral Doppler shows an impaired relaxation pattern of left ventricular diastolic filling.  Left Atrium: The left atrium is normal in size.  Right Ventricle: The right ventricle is normal in size. There is normal right ventricular global systolic function.  Right Atrium: The right atrium is normal in size.  Aortic Valve: The aortic valve was not well visualized. There is no evidence of aortic valve stenosis.  There is no evidence of aortic valve regurgitation. The peak instantaneous gradient of the aortic valve is 11.2 mmHg. The mean gradient of the aortic valve is 5.9 mmHg.  Mitral Valve: The mitral valve is normal in structure. There is trace mitral valve regurgitation.  Tricuspid Valve: The tricuspid valve is structurally normal. There is trace tricuspid regurgitation.  Pulmonic Valve: The pulmonic valve is not well visualized. The  pulmonic valve regurgitation was not well visualized.  Pericardium: There is no pericardial effusion noted.  Aorta: The aortic root is normal.  Pulmonary Artery: The tricuspid regurgitant velocity is 2.00 m/s, and with an estimated right atrial pressure of 3 mmHg, the estimated pulmonary artery pressure is normal with the RVSP at 19.0 mmHg.  Systemic Veins: The inferior vena cava size appears small.      CONCLUSIONS:  1. Left ventricular systolic function is hyperdynamic with a 65-70% estimated ejection fraction.  2. Spectral Doppler shows an impaired relaxation pattern of left ventricular diastolic filling.  3. Left ventricular cavity size is decreased.  4. There is trace mitral and tricuspid regurgitation.    Labwork & Pathology   Complete Blood Count  Lab Results   Component Value Date    WBC 10.2 09/17/2023    HGB 13.6 09/17/2023    HCT 43.1 09/17/2023    MCV 91 09/17/2023     09/17/2023       Peripheral Eosinophil Count:   Eosinophils Absolute (x10E9/L)   Date Value   09/01/2022 0.17   04/25/2022 0.08   10/31/2017 0.15       Metabolic Parameters  Sodium   Date/Time Value Ref Range Status   09/17/2023 07:47  136 - 145 mmol/L Final     Potassium   Date/Time Value Ref Range Status   09/17/2023 07:47 AM 3.8 3.5 - 5.3 mmol/L Final     Chloride   Date/Time Value Ref Range Status   09/17/2023 07:47 AM 93 (L) 98 - 107 mmol/L Final     Bicarbonate   Date/Time Value Ref Range Status   09/17/2023 07:47 AM 34 (H) 21 - 32 mmol/L Final     Anion Gap   Date/Time Value Ref Range Status   09/17/2023 07:47 AM 17 10 - 20 mmol/L Final     Urea Nitrogen   Date/Time Value Ref Range Status   09/17/2023 07:47 AM 10 6 - 23 mg/dL Final     Creatinine   Date/Time Value Ref Range Status   09/17/2023 07:47 AM 0.84 0.50 - 1.05 mg/dL Final     GFR Female   Date/Time Value Ref Range Status   09/17/2023 07:47 AM 77 >90 mL/min/1.73m2 Final     Comment:      CALCULATIONS OF ESTIMATED GFR ARE PERFORMED   USING THE 2021 CKD-EPI STUDY  "REFIT EQUATION   WITHOUT THE RACE VARIABLE FOR THE IDMS-TRACEABLE   CREATININE METHODS.    https://jasn.asnjournals.org/content/early/2021/09/22/ASN.4650370356       Glucose   Date/Time Value Ref Range Status   09/17/2023 07:47 AM 99 74 - 99 mg/dL Final     Calcium   Date/Time Value Ref Range Status   09/17/2023 07:47 AM 9.4 8.6 - 10.6 mg/dL Final     Phosphorus   Date/Time Value Ref Range Status   09/16/2023 04:29 PM 2.3 (L) 2.5 - 4.9 mg/dL Final     Comment:      The performance characteristics of phosphorus testing in   heparinized plasma have been validated by the individual     laboratory site where testing is performed. Testing    on heparinized plasma is not approved by the FDA;    however, such approval is not necessary.       AST   Date/Time Value Ref Range Status   09/01/2022 12:00 PM 22 9 - 39 U/L Final     ALT (SGPT)   Date/Time Value Ref Range Status   09/01/2022 12:00 PM 28 7 - 45 U/L Final     Comment:      Patients treated with Sulfasalazine may generate    falsely decreased results for ALT.         Coagulation Parameters  Protime   Date/Time Value Ref Range Status   09/15/2023 07:56 AM 11.3 9.8 - 12.8 sec Final     Comment:     Note new reference range as of 6/20/2023 at 10:00am.     INR   Date/Time Value Ref Range Status   09/15/2023 07:56 AM 1.0 0.9 - 1.1 Final       Serum Immunoglobulin E:    No results found for: \"IGE\"     Pathology  9/13/23: FINAL DIAGNOSIS   A.  RIGHT LUNG, LOWER LOBE, PULMONARY WEDGE RESECTION:   -- INVASIVE ACINAR ADENOCARCINOMA (1.8 CM) INVOLVING LUNG PARENCHYMA   B.  LEVEL 8 LYMPH NODE, EXCISION:  -- ONE (1) LYMPH NODE NEGATIVE FOR TUMOR.  C.  LEVEL 9 LYMPH NODE, EXCISION:  -- ONE (1) LYMPH NODE NEGATIVE FOR TUMOR.  D.  LEVEL 7 LYMPH NODE, EXCISION:  -- METASTATIC CARCINOMA TO FRAGMENTS OF LYMPH NODE(S).  E.  LEVEL 10 LYMPH NODE, EXCISION:  -- FRAGMENTS OF LYMPH NODE(S) NEGATIVE FOR TUMOR.  F.  LEVEL 4R LYMPH NODE, EXCISION:  -- FRAGMENTS OF LYMPH NODE(S) NEGATIVE FOR " TUMOR.  Bronchoscopy & Sputum Cultures       ASSESSMENT/PLAN     Ms. Plummer is a 68 y.o. female; was referred to the [facility] for follow up of COPD and hypoxic respiratory failure.    Problem List and Orders  Diagnoses and all orders for this visit:  Chronic obstructive pulmonary disease, unspecified COPD type (CMS/HCC)  -     fluticasone-umeclidin-vilanter (Trelegy Ellipta) 200-62.5-25 mcg blister with device; Inhale 1 puff once daily. Rinse mouth with water after use to reduce aftertaste and incidence of candidiasis. Do not swallow.  Chronic respiratory failure with hypoxia (CMS/HCC)  Adenocarcinoma of right lung (CMS/[facility])      Assessment and Plan / Recommendations:  1. Severe COPD: Most recent PFT from 8/29/22 showing GOLD 3 Severe COPD with FEV1 of 41%, +BD response and DLCO at 31%. 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 Was also previously prescribed Incruse for triple therapy; but not covered by insurance. Infrequent nonproductive cough with frequent wheezing.  - Stop Breztri; states that her burning mouth syndrome is far worse on the aerosolized Breztri than what she experienced with a powdered inhaler previously  -Start Trelegy 200; 1 inhalation once a day.  Rinse mouth after use. (Provided patient a free 30-day card from the drug company and also provided her contact information for [employer] for financial support if medication is not covered under her insurance plan)  - Continue Albuterol Nebs & HFA PRN (states that the nebulized medications also affect her mouth pretty significantly)  -Continue pulmonary rehab; started this on 11/30/2023 -was referred by Dr. Escamilla from medical oncology     2. Chronic Hypoxic Respiratory Failure: Was admitted for a COPD exacerbation 4/2022 and was discharged on 2L supplemental oxygen.   -Since her lung surgery; she is now on 4 L of supplemental oxygen at all times     3. NSCLC: Patient completed a  lung cancer screening chest CT on 4/27/2023 which showed a 7 mm right lower lobe nodule; surrounding this nodularity appeared GGO; very inflammatory looking. Lung RADS 4A; advising 3-month follow-up.  -Diagnosed with lung cancer on 9/13/2023 after having a right lower lobe wedge resection  -Continue to follow with thoracic surgery  -Continue to follow with medical oncology     4. Suspected BLAKE: Patient admits to frequent snoring and  has noticed abnormal breathing patterns while asleep. Patient admits to significant daytime fatigue and will often doze off easily. ESS today was 13. Has never completed a sleep study.  - Currently established with sleep medicine    RTC 3 months

## 2023-12-01 ENCOUNTER — OFFICE VISIT (OUTPATIENT)
Dept: PULMONOLOGY | Facility: CLINIC | Age: 68
End: 2023-12-01
Payer: MEDICARE

## 2023-12-01 ENCOUNTER — TELEPHONE (OUTPATIENT)
Dept: CASE MANAGEMENT | Facility: HOSPITAL | Age: 68
End: 2023-12-01

## 2023-12-01 ENCOUNTER — APPOINTMENT (OUTPATIENT)
Dept: CARDIAC REHAB | Facility: HOSPITAL | Age: 68
End: 2023-12-01
Payer: MEDICARE

## 2023-12-01 VITALS
HEIGHT: 62 IN | SYSTOLIC BLOOD PRESSURE: 122 MMHG | DIASTOLIC BLOOD PRESSURE: 64 MMHG | BODY MASS INDEX: 27.23 KG/M2 | OXYGEN SATURATION: 92 % | HEART RATE: 110 BPM | WEIGHT: 148 LBS

## 2023-12-01 DIAGNOSIS — J44.9 CHRONIC OBSTRUCTIVE PULMONARY DISEASE, UNSPECIFIED COPD TYPE (MULTI): Primary | ICD-10-CM

## 2023-12-01 DIAGNOSIS — J96.11 CHRONIC RESPIRATORY FAILURE WITH HYPOXIA (MULTI): ICD-10-CM

## 2023-12-01 DIAGNOSIS — C34.91 ADENOCARCINOMA OF RIGHT LUNG (MULTI): ICD-10-CM

## 2023-12-01 PROCEDURE — 1125F AMNT PAIN NOTED PAIN PRSNT: CPT | Performed by: NURSE PRACTITIONER

## 2023-12-01 PROCEDURE — 1159F MED LIST DOCD IN RCRD: CPT | Performed by: NURSE PRACTITIONER

## 2023-12-01 PROCEDURE — 3008F BODY MASS INDEX DOCD: CPT | Performed by: NURSE PRACTITIONER

## 2023-12-01 PROCEDURE — 99215 OFFICE O/P EST HI 40 MIN: CPT | Performed by: NURSE PRACTITIONER

## 2023-12-01 PROCEDURE — 1036F TOBACCO NON-USER: CPT | Performed by: NURSE PRACTITIONER

## 2023-12-01 PROCEDURE — 1160F RVW MEDS BY RX/DR IN RCRD: CPT | Performed by: NURSE PRACTITIONER

## 2023-12-01 RX ORDER — FLUTICASONE FUROATE, UMECLIDINIUM BROMIDE AND VILANTEROL TRIFENATATE 200; 62.5; 25 UG/1; UG/1; UG/1
1 POWDER RESPIRATORY (INHALATION) DAILY
Qty: 1 EACH | Refills: 3 | Status: SHIPPED | OUTPATIENT
Start: 2023-12-01

## 2023-12-01 RX ORDER — TOPIRAMATE 100 MG/1
100 TABLET, FILM COATED ORAL DAILY
COMMUNITY
End: 2024-06-03 | Stop reason: ENTERED-IN-ERROR

## 2023-12-01 ASSESSMENT — ENCOUNTER SYMPTOMS
RHINORRHEA: 0
SHORTNESS OF BREATH: 1
FATIGUE: 1
EYE ITCHING: 0
DIARRHEA: 0
ACTIVITY CHANGE: 0
ABDOMINAL PAIN: 1
TREMORS: 0
FEVER: 0
VOICE CHANGE: 0
STRIDOR: 0
BACK PAIN: 0
CHEST TIGHTNESS: 0
UNEXPECTED WEIGHT CHANGE: 0
ARTHRALGIAS: 0
AGITATION: 0
ROS GI COMMENTS: DENIES ACID REFLUX
SORE THROAT: 0
SLEEP DISTURBANCE: 0
DIZZINESS: 0
COUGH: 0
MYALGIAS: 0
JOINT SWELLING: 0
HEADACHES: 0
VOMITING: 0
NAUSEA: 1
NERVOUS/ANXIOUS: 1
WEAKNESS: 0
WHEEZING: 1
EYE REDNESS: 0
PALPITATIONS: 0
TROUBLE SWALLOWING: 0
BRUISES/BLEEDS EASILY: 0
SINUS PAIN: 0
SINUS PRESSURE: 0
APPETITE CHANGE: 1
CHILLS: 0

## 2023-12-01 ASSESSMENT — PAIN SCALES - GENERAL: PAINLEVEL: 5

## 2023-12-01 NOTE — PATIENT INSTRUCTIONS
Today we discussed your current symptoms, recent history and plan moving forward.    -Stop Breztri  -Start Trelegy 200; 1 inhalation once a day.  Rinse mouth after use.  This will be your new long-acting daily maintenance inhaler to better control your COPD. (Please call my office if you need any assistance of getting this prescription if not covered under your insurance.  I also provided you the free 30-day trial card as well as the number to the [employer] company for financial assistance).  -Continue albuterol as needed  -Continue 4 L of oxygen at all times  -Continue attending pulmonary rehab; this will be very beneficial in helping build back your stamina  -Continue following with Dr. Cho from thoracic surgery  -Continue following with Dr. Escamilla from oncology    Thank you for visiting the pulmonary clinic today! It was a pleasure to participate in your care.  Please return to clinic 3 months or sooner if needed.    Bry Sykes CNP  My Office Number: [phone]   CT Scheduling: [phone]  PFT/Follow Up Visit Scheduling: [phone]  My Nurse: Marcela Joiner  My [place]: Marcelina

## 2023-12-01 NOTE — TELEPHONE ENCOUNTER
SW reached out to patient to discuss her PHQ-9 screen that was completed on 10/13/23.  SW introduced self and role.  SW asked patient how she was doing.  Patient stated that she was doing okay.  SW discussed her depression screen.  Patient stated that she does struggle with depression and see's a psychiatrist and is taking her medication.  Patient stated that along with her lung cancer she has COPD.  Patient discussed that sometimes she feels like doing Palliative Care but she knows her family wants her to continue her treatment.  Patient stated that she has a good support system.  SW asked if she was in counseling.  Patient stated that she tried it in the past and it was not for her.  SW provided support and active listening.  SW encouraged patient to reach out to the SW at [facility] if she would need anything.      Kyle Newman MSW, LSW

## 2023-12-05 ENCOUNTER — CLINICAL SUPPORT (OUTPATIENT)
Dept: CARDIAC REHAB | Facility: HOSPITAL | Age: 68
End: 2023-12-05
Payer: MEDICARE

## 2023-12-05 DIAGNOSIS — J44.9 CHRONIC OBSTRUCTIVE PULMONARY DISEASE, UNSPECIFIED COPD TYPE (MULTI): Primary | ICD-10-CM

## 2023-12-05 PROCEDURE — 94625 PHY/QHP OP PULM RHB W/O MNTR: CPT | Performed by: INTERNAL MEDICINE

## 2023-12-06 ENCOUNTER — OFFICE VISIT (OUTPATIENT)
Dept: HEMATOLOGY/ONCOLOGY | Facility: CLINIC | Age: 68
End: 2023-12-06
Payer: MEDICARE

## 2023-12-06 VITALS
BODY MASS INDEX: 27.68 KG/M2 | OXYGEN SATURATION: 93 % | HEART RATE: 113 BPM | SYSTOLIC BLOOD PRESSURE: 144 MMHG | TEMPERATURE: 96.8 F | WEIGHT: 151.35 LBS | RESPIRATION RATE: 18 BRPM | DIASTOLIC BLOOD PRESSURE: 75 MMHG

## 2023-12-06 DIAGNOSIS — Z99.81 DEPENDENCE ON CONTINUOUS SUPPLEMENTAL OXYGEN: ICD-10-CM

## 2023-12-06 DIAGNOSIS — C34.91: Primary | ICD-10-CM

## 2023-12-06 DIAGNOSIS — J44.9 ADVANCED COPD (MULTI): ICD-10-CM

## 2023-12-06 DIAGNOSIS — R93.89 ABNORMAL CT OF THE CHEST: ICD-10-CM

## 2023-12-06 PROCEDURE — 1036F TOBACCO NON-USER: CPT | Performed by: STUDENT IN AN ORGANIZED HEALTH CARE EDUCATION/TRAINING PROGRAM

## 2023-12-06 PROCEDURE — 99214 OFFICE O/P EST MOD 30 MIN: CPT | Performed by: STUDENT IN AN ORGANIZED HEALTH CARE EDUCATION/TRAINING PROGRAM

## 2023-12-06 PROCEDURE — 1125F AMNT PAIN NOTED PAIN PRSNT: CPT | Performed by: STUDENT IN AN ORGANIZED HEALTH CARE EDUCATION/TRAINING PROGRAM

## 2023-12-06 PROCEDURE — 1159F MED LIST DOCD IN RCRD: CPT | Performed by: STUDENT IN AN ORGANIZED HEALTH CARE EDUCATION/TRAINING PROGRAM

## 2023-12-06 PROCEDURE — 3008F BODY MASS INDEX DOCD: CPT | Performed by: STUDENT IN AN ORGANIZED HEALTH CARE EDUCATION/TRAINING PROGRAM

## 2023-12-06 PROCEDURE — 1160F RVW MEDS BY RX/DR IN RCRD: CPT | Performed by: STUDENT IN AN ORGANIZED HEALTH CARE EDUCATION/TRAINING PROGRAM

## 2023-12-06 ASSESSMENT — ENCOUNTER SYMPTOMS
OCCASIONAL FEELINGS OF UNSTEADINESS: 1
LOSS OF SENSATION IN FEET: 0
DEPRESSION: 0

## 2023-12-06 ASSESSMENT — PAIN SCALES - GENERAL: PAINLEVEL: 5

## 2023-12-06 NOTE — PROGRESS NOTES
"[facility]   Thoracic Oncology New Patient Visit - [place]     Patient ID: Fatmata Plummer is a 68 y.o. female.  Primary Care Provider: Soren Covarrubias DO      Diagnosis: Stage III adenocarcinoma of lung, right (CMS/HCC) [C34.91]      Cancer Staging   Adenocarcinoma of lung (CMS/HCC)  Staging form: Lung, AJCC 8th Edition  - Pathologic stage from 9/13/2023: Stage IIIA (pT1b, pN2, cM0) - Signed by Olivia Escamilla MD on 11/2/2023     Molecular Studies    PDL 1  5%   NGS KRAS G12c     Oncologic History:  2009 - Stage IA Nodular Sclerosis Hodgkin Lymphoma s/p 4 cycles ABVD and IFRT. Treated by Dr. Mark Brar.    4/2023 - Right Lower Lobe lung nodule noted on lung cancer screening CT scan, growing on follow-up CT 3 months later     9/13/23 Right lower lobe wedge resection by Dr. Cho     Initial Presentation:  10/25/2023 Patient presents in a wheelchair accompanied by her . She is noticeably tired, reports she did not sleep much last night due to anxiety about the visit. She listens and participates, but her  does most of the talking.    They are aware of the lung cancer diagnosis which was reviewed by Dr. Cho and by Dr. Cao. They understood from Dr. Cho that adjuvant therapy (chemo) could be considered, but would have to be weight against her other co-morbidities. They come today for a second opinion.    Prior to surgery she was able to do ADLs in the home and accompany her  for grocery shopping. Since the surgery, she has great difficulty with activity, she feels winded even with short walks inside the house and needs help with dressing and bathing.     PMH: Mrs. Plummer has severe COPD, follows with Pulmonary and requires continuous use of oxygen, 4L/min.    HPI   Chief Concern: \"I'm doing the pulmonary therapy and I have a new inhaler\"    Interval History:   Fatmata presents with her  for follow-up visit and scan review. She has started pulmonary " rehab and also had a visit with Pulmonology - a new daily inhaler was recommended which she picked up from the pharmacy today.    She is much more alert and engaged than last visit. Her  states she is getting back to her usual activities. She still uses oxygen 4L/min which has been stable for several years.    Review of Systems - 10 systems reviewed and negative unless noted.  Pertinent Positive: dyspnea with exertion    Pertinent Negatives:  nausea  vomiting  diarrhea  constipation  difficulty swallowing  headache, vision change, hearing change, tinnitus,   chest pain,   abdominal pain,   numbness/tingling in fingers or toes  fevers, chills, sick contacts.  insomnia, mood changes    Meds (Current):  Current Outpatient Medications   Medication Instructions    acetaminophen-codeine (Tylenol w/ Codeine #3) 300-30 mg tablet 1 tablet, oral, Every 6 hours PRN    albuterol 90 mcg/actuation aerosol powdr breath activated inhaler 2 puffs, inhalation, Every 6 hours PRN    albuterol 2.5 mg, nebulization, Every 4 hours PRN    atorvastatin (LIPITOR) 20 mg, oral, Daily    cyanocobalamin (VITAMIN B-12) 1,000 mcg, intramuscular, Every 30 days    DULoxetine (Cymbalta) 60 mg DR capsule 1 capsule, Every 24 hours    fluticasone-umeclidin-vilanter (Trelegy Ellipta) 200-62.5-25 mcg blister with device 1 puff, inhalation, Daily, Rinse mouth with water after use to reduce aftertaste and incidence of candidiasis. Do not swallow.    folic acid (FOLVITE) 1 mg, oral, Daily    gabapentin (Neurontin) 300 mg capsule 3 capsules, oral, 2 times daily    levothyroxine (SYNTHROID, LEVOXYL) 25 mcg, oral, Daily before breakfast    LORazepam (ATIVAN) 1.5 mg, oral, 2 times daily,      promethazine (PHENERGAN) 25 mg, oral, Every 12 hours    rOPINIRole (REQUIP) 1 mg, oral, Nightly    tiZANidine (Zanaflex) 4 mg tablet  1 tablet as needed Orally Three times a day    topiramate (TOPAMAX) 100 mg, oral, Daily       Allergies   Allergen Reactions     Bupropion Other and Unknown    Buspirone Other    Cilostazol Unknown    Latex Other     BLISTER    Pseudoephedrine Other and Unknown    Nsaids (Non-Steroidal Anti-Inflammatory Drug) Palpitations       Objective   BSA: 1.73 meters squared    Visit Vitals  /75 (BP Location: Left arm, Patient Position: Sitting, BP Cuff Size: Adult)   Pulse (!) 113   Temp 36 °C (96.8 °F) (Temporal)   Resp 18   Wt 68.6 kg (151 lb 5.5 oz)   SpO2 93%   BMI 27.68 kg/m²   OB Status Hysterectomy   Smoking Status Former   BSA 1.73 m²        Performance Status:  (2) Ambulatory and capable of self care, unable to carry out work activity, up and about > 50% or waking hours     Physical Exam    General: Pleasant woman, appears stated age, well-groomed in street clothes, Alert and oriented, actively participates in visit  Head: Hair present, fully covering scalp. Symmetric facial expressions  Eyes: PERRL, EOMI, clear sclera, eyebrows present.  Ears/Nose/Mouth/Throat:  Oral mucous membranes moist. No oral ulcers. No palpable pre/post-auricular lymph nodes  Neck: No palpable cervical chain lymph nodes  Respiratory: nasal cannula in place, oxygen flow 4L/min. Patient able to maintain conversation without stopping to breathe. Good air movement in all lung fields, clear to auscultation  Cardio: Regular rate and rhythm, normal S1 and S2, radial pulses symmetric  GI: Nondistended, soft, non-tender abdomen  Musculoskeletal: Normal muscle bulk and tone, ROM intact, no joint swelling.  Seated in wheelchair, feels too weak to stand/walk.  Extremities: No ankle swelling, no arm or leg wounds, clubbing on nails  Neuro: Alert, cognition intact, speech normal. Facial expressions symmetric.  No motor deficits noted. Sensation intact to touch and hot/cold.   Moves arms and legs against resistance while seated in wheelchair. Does not feel strong enough to stand or walk.  Psychological: Appropriate mood and behavior.  Skin: Warm and dry, no lesions, no  tim    Results:  Labs:  Lab Results   Component Value Date    WBC 10.2 09/17/2023    HGB 13.6 09/17/2023    HCT 43.1 09/17/2023    MCV 91 09/17/2023     09/17/2023      Lab Results   Component Value Date    NEUTROABS 4.76 09/01/2022      Lab Results   Component Value Date    GLUCOSE 99 09/17/2023    CALCIUM 9.4 09/17/2023     09/17/2023    K 3.8 09/17/2023    CO2 34 (H) 09/17/2023    CL 93 (L) 09/17/2023    BUN 10 09/17/2023    CREATININE 0.84 09/17/2023     Lab Results   Component Value Date    ALT 28 09/01/2022    AST 22 09/01/2022    ALKPHOS 73 09/01/2022    BILITOT 0.4 09/01/2022        Imaging:  I have personally reviewed the below imaging and concur with the reported findings unless otherwise stated:    11/28/2023 CT chest wo IV contrast  -post- surgical changes noted in R lower lobe segment  -right paratracheal lymph nodes slightly enlarged      8/14/2023 PET CT  IMPRESSION:  1. Hypermetabolic right lower lobe nodule is highly concerning for  malignancy. Recommend tissue biopsy for further evaluation.  2. No evidence of hypermetabolic lymphadenopathy or metastatic disease.      Pathology:  Accession #: [id number]            Pathologist:                   KAITLIN PANDEY MD  Date of Procedure:    9/13/2023  Date Received:          9/13/2023  Date Reported           9/20/2023  Submitting Physician:   BROOKE FARNSWORTH MD  Location:                    [facility]  Other External #    Procedures/Addenda Present  FINAL DIAGNOSIS  A.  RIGHT LUNG, LOWER LOBE, PULMONARY WEDGE RESECTION:  -- INVASIVE ACINAR ADENOCARCINOMA (1.8 CM) INVOLVING LUNG PARENCHYMA; SEE NOTE AND SYNOPTIC REPORT.    NOTE: Additional immunostain for PD-L1 and next generation sequencing (NGS) will be performed and reported in addenda.    B.  LEVEL 8 LYMPH NODE, EXCISION:  -- ONE (1) LYMPH NODE NEGATIVE FOR TUMOR.    C.  LEVEL 9 LYMPH NODE, EXCISION:  -- ONE (1) LYMPH NODE NEGATIVE FOR TUMOR.    D.  LEVEL 7 LYMPH NODE,  EXCISION:  -- METASTATIC CARCINOMA TO FRAGMENTS OF LYMPH NODE(S).    E.  LEVEL 10 LYMPH NODE, EXCISION:  -- FRAGMENTS OF LYMPH NODE(S) NEGATIVE FOR TUMOR.    F.  LEVEL 4R LYMPH NODE, EXCISION:  -- FRAGMENTS OF LYMPH NODE(S) NEGATIVE FOR TUMOR.    CASE SUMMARY REPORT       SPECIMEN  Procedure:      Wedge resection  Specimen Laterality:      Right  TUMOR  Tumor Focality:      Single focus  Tumor Site:      Lower lobe of lung     Tumor Size     Total Tumor Size (size of entire tumor):      Greatest Dimension  (Centimeters): 1.8 cm  Histologic Type:      Invasive acinar adenocarcinoma  Visceral Pleura Invasion:      Not identified  Direct Invasion of Adjacent Structures:      Not applicable (no adjacent  structures present)  Treatment Effect:      No known presurgical therapy  Lymphovascular Invasion:      Not identified  MARGINS  Margin Status for Invasive Carcinoma:      All margins negative for invasive  carcinoma   Closest Margin(s) to Invasive Carcinoma:        Parenchymal  Margin Status for Non-Invasive Tumor:      Not applicable  REGIONAL LYMPH NODES  Lymph Node(s) from Prior Procedures:      No known prior lymph node sampling  performed  Regional Lymph Node Status:        Tumor present in regional lymph node(s)     Number of Lymph Nodes with Tumor:          At least: 1     [place] Site(s) with Tumor:          7: Subcarinal   Number of Lymph Nodes Examined:        At least: 5    [place] Site(s) Examined:         4R: Lower paratracheal          8R: Para-esophageal (below amarilys)          9R: Pulmonary ligament          10R: Hilar          7: Subcarinal  PATHOLOGIC STAGE CLASSIFICATION (pTNM, AJCC 8th Edition)  pT Category:      pT1b  pN Category:      pN2  Representative Blocks:      Normal Block: A8       Tumor Block: A3     Addendum Diagnosis   PD-L1 22C3  by Immunohistochemistry with Interpretation, pembrolizumab   Block used:  A3   Interpretation: Low Expression   Tumor Proportion Score (TPS): 5 %     Addendum  Diagnosis   TEST: Focused Solid Tumor DNA/RNA Panel   SPECIMEN: FFPE, LUNG, RIGHT LOWER LOBE, [id number] A3   DISEASE DIAGNOSIS: Adenocarcinoma   Estimated Tumor Content: 40%   COLLECTION DATE: 9/13/2023   RECEIVED DATE: 9/22/2023   REPORT DATE: 09/27/2023     MICROSATELLITE STATUS: Microsatellite Instability-High (MSI-H) is NOT DETECTED.     DISEASE ASSOCIATED GENOMIC FINDINGS:   KRAS p.G12C (NM_033360 c.34G>T)   TP53 p.R249W (NM_000546 c.745A>T)     Surgical Pathology (reviewed in [facility])  Accession #: [id number]  Date of Procedure:  6/25/2009  Pathologist:  ARIS DUNCAN, MDDate Reported: 6/25/2009  Submitting Physician: JAILYN MCKENZIE D.O.    FINAL DIAGNOSIS  A.  RIGHT AXILLARY LYMPH NODE, [id number] (6/3/09):HODGKIN'S LYMPHOMA NODULAR SCLEROSIS TYPE.    Microscopic Description: Sections show lymph node with partial involvement by Hodgkin's Lymphoma.  Thereare syncytia of Sacha-Andres cells in an appropriate mixed cellularbackground and focal sclerosis. Immunostain for LCA, fascin, CD15, CD20, CD30, and CD3 are consistent withHodgkin's Lymphoma.      Assessment/Plan      Fatmata Plummer is a 68 y.o. female with prior stage IA Hodgkin lymphoma (R axilla) treated in 2009 by Dr. Mckenzie, severe COPD requiring oxygen ,and recent diagnosis of pT1bN2 adenocarcinoma of the Right lower lobe, surgically resected 9/13/2023 by Dr. Cho.     She has Stage IIIA lung cancer, which has a recurrence risk of about 50% over 5 years. Adjuvant chemotherapy and immunotherapy is typically recommended to decrease the risk of recurrence. However, she has several medical co-morbidities and decreased performance status (ECOG 3) that put her at higher risk for complications from adjuvant therapy. Extensive conversation at 10/25/2023 visit with shared decision-making for surveillance strategy.    Surveillance imaging is recommended to monitor for disease recurrence with CT Chest every 3-6 months in the first 2 years, then yearly for a total of  5 years.    Today 12/6, I reviewed results of CT chest with patient which shows expected changes from recent surgery. The slight increase in the paraesophageal node may be due to post-surgical changes and healing. No need for any procedures at this time, will follow-up with another scan in Feb/March 2024.    MRI Brain - no intracranial tumors. Repeat yearly (or sooner if CNS symptoms occur).    Severe COPD  Following with Pulmonary, starting Trelegy inhaler    She is participating in Pulmonary Rehab and feels it is helping her.  - Continue Pulmonary rehab     Patient and her  asked several questions, which I answered to their satisfaction.      Time spent face to face with patient: 20 minutes     Olivia Escamilla MD  [facility]

## 2023-12-06 NOTE — PROGRESS NOTES
Patient is here for follow up. She is feeling overall well. She has SOB with exertion. She is currently enrolled in pul rehab at [facility].     PO intake ok, no real appetite. Frequent nausea, takes promethazine with relief. Weight appears to stable. Frequent constipation, relieved with OTC stool softeners.     Medications and allergies reviewed.     Physician updated.

## 2023-12-07 ENCOUNTER — CLINICAL SUPPORT (OUTPATIENT)
Dept: CARDIAC REHAB | Facility: HOSPITAL | Age: 68
End: 2023-12-07
Payer: MEDICARE

## 2023-12-07 DIAGNOSIS — J44.9 CHRONIC OBSTRUCTIVE PULMONARY DISEASE, UNSPECIFIED COPD TYPE (MULTI): Primary | ICD-10-CM

## 2023-12-07 PROCEDURE — 94625 PHY/QHP OP PULM RHB W/O MNTR: CPT | Performed by: INTERNAL MEDICINE

## 2023-12-08 ENCOUNTER — CLINICAL SUPPORT (OUTPATIENT)
Dept: CARDIAC REHAB | Facility: HOSPITAL | Age: 68
End: 2023-12-08
Payer: MEDICARE

## 2023-12-08 DIAGNOSIS — J44.9 CHRONIC OBSTRUCTIVE PULMONARY DISEASE, UNSPECIFIED COPD TYPE (MULTI): Primary | ICD-10-CM

## 2023-12-08 PROCEDURE — 94625 PHY/QHP OP PULM RHB W/O MNTR: CPT | Performed by: INTERNAL MEDICINE

## 2023-12-11 ENCOUNTER — APPOINTMENT (OUTPATIENT)
Dept: PULMONOLOGY | Facility: CLINIC | Age: 68
End: 2023-12-11
Payer: MEDICARE

## 2023-12-12 ENCOUNTER — CLINICAL SUPPORT (OUTPATIENT)
Dept: CARDIAC REHAB | Facility: HOSPITAL | Age: 68
End: 2023-12-12
Payer: MEDICARE

## 2023-12-12 DIAGNOSIS — J44.9 CHRONIC OBSTRUCTIVE PULMONARY DISEASE, UNSPECIFIED COPD TYPE (MULTI): Primary | ICD-10-CM

## 2023-12-12 PROCEDURE — 94625 PHY/QHP OP PULM RHB W/O MNTR: CPT | Performed by: INTERNAL MEDICINE

## 2023-12-14 ENCOUNTER — APPOINTMENT (OUTPATIENT)
Dept: CARDIAC REHAB | Facility: HOSPITAL | Age: 68
End: 2023-12-14
Payer: MEDICARE

## 2023-12-15 ENCOUNTER — APPOINTMENT (OUTPATIENT)
Dept: CARDIAC REHAB | Facility: HOSPITAL | Age: 68
End: 2023-12-15
Payer: MEDICARE

## 2023-12-19 ENCOUNTER — APPOINTMENT (OUTPATIENT)
Dept: HEMATOLOGY/ONCOLOGY | Facility: CLINIC | Age: 68
End: 2023-12-19
Payer: MEDICARE

## 2023-12-19 ENCOUNTER — CLINICAL SUPPORT (OUTPATIENT)
Dept: CARDIAC REHAB | Facility: HOSPITAL | Age: 68
End: 2023-12-19
Payer: MEDICARE

## 2023-12-19 DIAGNOSIS — J44.9: Primary | ICD-10-CM

## 2023-12-19 PROCEDURE — 94625 PHY/QHP OP PULM RHB W/O MNTR: CPT | Performed by: INTERNAL MEDICINE

## 2023-12-21 ENCOUNTER — CLINICAL SUPPORT (OUTPATIENT)
Dept: CARDIAC REHAB | Facility: HOSPITAL | Age: 68
End: 2023-12-21
Payer: MEDICARE

## 2023-12-21 DIAGNOSIS — J44.9 CHRONIC OBSTRUCTIVE PULMONARY DISEASE, UNSPECIFIED COPD TYPE (MULTI): Primary | ICD-10-CM

## 2023-12-21 PROCEDURE — 94625 PHY/QHP OP PULM RHB W/O MNTR: CPT | Performed by: INTERNAL MEDICINE

## 2023-12-22 ENCOUNTER — CLINICAL SUPPORT (OUTPATIENT)
Dept: CARDIAC REHAB | Facility: HOSPITAL | Age: 68
End: 2023-12-22
Payer: MEDICARE

## 2023-12-22 DIAGNOSIS — J44.9 CHRONIC OBSTRUCTIVE PULMONARY DISEASE, UNSPECIFIED COPD TYPE (MULTI): Primary | ICD-10-CM

## 2023-12-22 PROCEDURE — 94625 PHY/QHP OP PULM RHB W/O MNTR: CPT | Performed by: INTERNAL MEDICINE

## 2023-12-26 ENCOUNTER — APPOINTMENT (OUTPATIENT)
Dept: CARDIAC REHAB | Facility: HOSPITAL | Age: 68
End: 2023-12-26
Payer: MEDICARE

## 2023-12-28 ENCOUNTER — CLINICAL SUPPORT (OUTPATIENT)
Dept: CARDIAC REHAB | Facility: HOSPITAL | Age: 68
End: 2023-12-28
Payer: MEDICARE

## 2023-12-28 DIAGNOSIS — J44.9 CHRONIC OBSTRUCTIVE PULMONARY DISEASE, UNSPECIFIED COPD TYPE (MULTI): ICD-10-CM

## 2023-12-28 PROCEDURE — 94625 PHY/QHP OP PULM RHB W/O MNTR: CPT | Performed by: INTERNAL MEDICINE

## 2023-12-29 ENCOUNTER — CLINICAL SUPPORT (OUTPATIENT)
Dept: CARDIAC REHAB | Facility: HOSPITAL | Age: 68
End: 2023-12-29
Payer: MEDICARE

## 2023-12-29 DIAGNOSIS — J44.9 CHRONIC OBSTRUCTIVE PULMONARY DISEASE, UNSPECIFIED COPD TYPE (MULTI): ICD-10-CM

## 2023-12-29 PROCEDURE — 94625 PHY/QHP OP PULM RHB W/O MNTR: CPT | Performed by: INTERNAL MEDICINE

## 2024-01-02 ENCOUNTER — APPOINTMENT (OUTPATIENT)
Dept: CARDIAC REHAB | Facility: HOSPITAL | Age: 69
End: 2024-01-02
Payer: MEDICARE

## 2024-01-04 ENCOUNTER — CLINICAL SUPPORT (OUTPATIENT)
Dept: CARDIAC REHAB | Facility: HOSPITAL | Age: 69
End: 2024-01-04
Payer: MEDICARE

## 2024-01-04 DIAGNOSIS — J44.9 LUNG DISEASE, OBSTRUCTIVE (MULTI): Primary | ICD-10-CM

## 2024-01-04 PROCEDURE — 94625 PHY/QHP OP PULM RHB W/O MNTR: CPT | Performed by: INTERNAL MEDICINE

## 2024-01-05 ENCOUNTER — CLINICAL SUPPORT (OUTPATIENT)
Dept: CARDIAC REHAB | Facility: HOSPITAL | Age: 69
End: 2024-01-05
Payer: MEDICARE

## 2024-01-05 DIAGNOSIS — J44.1 COPD EXACERBATION (MULTI): Primary | ICD-10-CM

## 2024-01-05 PROCEDURE — 94625 PHY/QHP OP PULM RHB W/O MNTR: CPT | Performed by: INTERNAL MEDICINE

## 2024-01-09 ENCOUNTER — CLINICAL SUPPORT (OUTPATIENT)
Dept: CARDIAC REHAB | Facility: HOSPITAL | Age: 69
End: 2024-01-09
Payer: MEDICARE

## 2024-01-09 DIAGNOSIS — J44.9 CHRONIC OBSTRUCTIVE PULMONARY DISEASE, UNSPECIFIED COPD TYPE (MULTI): Primary | ICD-10-CM

## 2024-01-09 PROCEDURE — 94625 PHY/QHP OP PULM RHB W/O MNTR: CPT | Performed by: INTERNAL MEDICINE

## 2024-01-11 ENCOUNTER — CLINICAL SUPPORT (OUTPATIENT)
Dept: CARDIAC REHAB | Facility: HOSPITAL | Age: 69
End: 2024-01-11
Payer: MEDICARE

## 2024-01-11 DIAGNOSIS — J44.9 CHRONIC OBSTRUCTIVE PULMONARY DISEASE, UNSPECIFIED COPD TYPE (MULTI): ICD-10-CM

## 2024-01-11 PROCEDURE — 94625 PHY/QHP OP PULM RHB W/O MNTR: CPT | Performed by: INTERNAL MEDICINE

## 2024-01-12 ENCOUNTER — CLINICAL SUPPORT (OUTPATIENT)
Dept: CARDIAC REHAB | Facility: HOSPITAL | Age: 69
End: 2024-01-12
Payer: MEDICARE

## 2024-01-12 DIAGNOSIS — J44.9 CHRONIC OBSTRUCTIVE PULMONARY DISEASE, UNSPECIFIED COPD TYPE (MULTI): ICD-10-CM

## 2024-01-12 PROCEDURE — 94625 PHY/QHP OP PULM RHB W/O MNTR: CPT | Performed by: INTERNAL MEDICINE

## 2024-01-16 ENCOUNTER — APPOINTMENT (OUTPATIENT)
Dept: CARDIAC REHAB | Facility: HOSPITAL | Age: 69
End: 2024-01-16
Payer: MEDICARE

## 2024-01-18 ENCOUNTER — CLINICAL SUPPORT (OUTPATIENT)
Dept: CARDIAC REHAB | Facility: HOSPITAL | Age: 69
End: 2024-01-18
Payer: MEDICARE

## 2024-01-18 DIAGNOSIS — J44.9 CHRONIC OBSTRUCTIVE PULMONARY DISEASE, UNSPECIFIED COPD TYPE (MULTI): Primary | ICD-10-CM

## 2024-01-18 PROCEDURE — 94625 PHY/QHP OP PULM RHB W/O MNTR: CPT | Performed by: INTERNAL MEDICINE

## 2024-01-19 ENCOUNTER — APPOINTMENT (OUTPATIENT)
Dept: CARDIAC REHAB | Facility: HOSPITAL | Age: 69
End: 2024-01-19
Payer: MEDICARE

## 2024-01-25 ENCOUNTER — CLINICAL SUPPORT (OUTPATIENT)
Dept: CARDIAC REHAB | Facility: HOSPITAL | Age: 69
End: 2024-01-25
Payer: MEDICARE

## 2024-01-25 DIAGNOSIS — J44.9 CHRONIC OBSTRUCTIVE PULMONARY DISEASE, UNSPECIFIED COPD TYPE (MULTI): ICD-10-CM

## 2024-01-25 PROCEDURE — 94625 PHY/QHP OP PULM RHB W/O MNTR: CPT | Performed by: INTERNAL MEDICINE

## 2024-01-25 NOTE — ADDENDUM NOTE
----- Message from Keturah England sent at 1/13/2017  2:20 PM CST -----  Contact: Self/ [phone]   Pt want to have some medication sent to the pharmacy for a UTI. Pt would like to have the medication sent to [facility]. Please call and advise     Thank you    Addended by: ARIE DA SILVA on: 1/25/2024 03:01 PM     Modules accepted: Orders     Attempted to contact pt to inform her, no answer left vm.   Macrobid sent.  Only needs 5 days  Will have 10 pills left over   no

## 2024-01-26 ENCOUNTER — CLINICAL SUPPORT (OUTPATIENT)
Dept: CARDIAC REHAB | Facility: HOSPITAL | Age: 69
End: 2024-01-26
Payer: MEDICARE

## 2024-01-26 DIAGNOSIS — J44.9 CHRONIC OBSTRUCTIVE PULMONARY DISEASE, UNSPECIFIED COPD TYPE (MULTI): Primary | ICD-10-CM

## 2024-01-26 PROCEDURE — 94625 PHY/QHP OP PULM RHB W/O MNTR: CPT | Performed by: INTERNAL MEDICINE

## 2024-01-30 ENCOUNTER — APPOINTMENT (OUTPATIENT)
Dept: CARDIAC REHAB | Facility: HOSPITAL | Age: 69
End: 2024-01-30
Payer: MEDICARE

## 2024-02-01 ENCOUNTER — APPOINTMENT (OUTPATIENT)
Dept: CARDIAC REHAB | Facility: HOSPITAL | Age: 69
End: 2024-02-01
Payer: MEDICARE

## 2024-02-02 ENCOUNTER — APPOINTMENT (OUTPATIENT)
Dept: CARDIAC REHAB | Facility: HOSPITAL | Age: 69
End: 2024-02-02
Payer: MEDICARE

## 2024-02-02 ENCOUNTER — TELEPHONE (OUTPATIENT)
Dept: PULMONOLOGY | Facility: HOSPITAL | Age: 69
End: 2024-02-02
Payer: MEDICARE

## 2024-02-06 ENCOUNTER — APPOINTMENT (OUTPATIENT)
Dept: CARDIAC REHAB | Facility: HOSPITAL | Age: 69
End: 2024-02-06
Payer: MEDICARE

## 2024-02-08 ENCOUNTER — APPOINTMENT (OUTPATIENT)
Dept: CARDIAC REHAB | Facility: HOSPITAL | Age: 69
End: 2024-02-08
Payer: MEDICARE

## 2024-02-09 ENCOUNTER — APPOINTMENT (OUTPATIENT)
Dept: CARDIAC REHAB | Facility: HOSPITAL | Age: 69
End: 2024-02-09
Payer: MEDICARE

## 2024-02-13 ENCOUNTER — APPOINTMENT (OUTPATIENT)
Dept: CARDIAC REHAB | Facility: HOSPITAL | Age: 69
End: 2024-02-13
Payer: MEDICARE

## 2024-02-15 ENCOUNTER — APPOINTMENT (OUTPATIENT)
Dept: CARDIAC REHAB | Facility: HOSPITAL | Age: 69
End: 2024-02-15
Payer: MEDICARE

## 2024-02-16 ENCOUNTER — APPOINTMENT (OUTPATIENT)
Dept: CARDIAC REHAB | Facility: HOSPITAL | Age: 69
End: 2024-02-16
Payer: MEDICARE

## 2024-02-20 ENCOUNTER — APPOINTMENT (OUTPATIENT)
Dept: CARDIAC REHAB | Facility: HOSPITAL | Age: 69
End: 2024-02-20
Payer: MEDICARE

## 2024-03-04 ENCOUNTER — APPOINTMENT (OUTPATIENT)
Dept: PULMONOLOGY | Facility: CLINIC | Age: 69
End: 2024-03-04
Payer: MEDICARE

## 2024-03-04 ENCOUNTER — HOSPITAL ENCOUNTER (OUTPATIENT)
Dept: RADIOLOGY | Facility: HOSPITAL | Age: 69
Discharge: HOME | End: 2024-03-04
Payer: MEDICARE

## 2024-03-04 DIAGNOSIS — C34.91: ICD-10-CM

## 2024-03-04 PROCEDURE — 71250 CT THORAX DX C-: CPT

## 2024-03-04 ASSESSMENT — ENCOUNTER SYMPTOMS
TROUBLE SWALLOWING: 0
RHINORRHEA: 0
SHORTNESS OF BREATH: 1
VOMITING: 0
DIARRHEA: 0
UNEXPECTED WEIGHT CHANGE: 0
SINUS PRESSURE: 0
EYE ITCHING: 0
ROS GI COMMENTS: DENIES ACID REFLUX
SORE THROAT: 0
ARTHRALGIAS: 0
NERVOUS/ANXIOUS: 1
AGITATION: 0
JOINT SWELLING: 0
BRUISES/BLEEDS EASILY: 0
EYE REDNESS: 0
NAUSEA: 1
PALPITATIONS: 0
ABDOMINAL PAIN: 1
COUGH: 0
ACTIVITY CHANGE: 0
WHEEZING: 1
APPETITE CHANGE: 1
VOICE CHANGE: 0
MYALGIAS: 0
CHILLS: 0
STRIDOR: 0
FEVER: 0
DIZZINESS: 0
TREMORS: 0
FATIGUE: 1
SINUS PAIN: 0
SLEEP DISTURBANCE: 0
BACK PAIN: 0

## 2024-03-04 NOTE — PROGRESS NOTES
Patient: Fatmata Plummer    [id number]  : 1955 -- AGE 68 y.o.    Provider: JESUS Bassett     Location [facility] [place] [facility]   Service Date: 3/5/2024         Department of Medicine  Division of Pulmonary, Critical Care, and Sleep Medicine         [facility]  Follow Up Visit Note    HISTORY OF PRESENT ILLNESS     PCP: Dr. Brand  Sleep: Randal Garland PA-C  Medical Oncology: Dr. Olivia Escamilla    HISTORY OF PRESENT ILLNESS   Fatmata Plummer is a 68 y.o. female who presents to a [facility] for a follow up visit with concerns of COPD.   I have independently interviewed and examined the patient in the office and reviewed available records.    DATE OF LAST VISIT: 2023    Current History  On today's visit, She reports she was able to get her previously prescribed Trelegy 200; states that it is expensive at the moment until she reaches her plan deductible.  Once deductible is reached it we will go down to $40 a month.  Unfortunately she has not been using the Trelegy as prescribed.  Very infrequently will use it (approximately once a week or even less).  States that she will use her albuterol inhaler more often.  I once again provided education to her and  about the importance of remaining on daily maintenance therapy in order to control her severe COPD.  States that any inhaler that she uses will trigger her burning mouth syndrome.  Strongly encouraged her to start using the Trelegy as its prescribed and she should start at least seeing some improvement in her day-to-day breathing with proper use.  She is no longer going to pulmonary rehab but was able to complete about 19-20 sessions.  About a month and a half ago she did come down with a lower respiratory tract infection and was treated with an antibiotic by her PCP; symptoms cleared.  They are requesting that I fill out a form for the [employer] today  in regards to her chronic oxygen use; forms filled out today. She is seeing Dr. Escamilla for follow up tomorrow. Just had Chest CT completed yesterday. They asked me to read results to them. I did, but also advised they follow up with Dr. Escamilal as planned to review in more detail from an oncology perspective.  CAT today: 28    Prior Visits & History   12/01/2023: On today's visit, She reports that ever since having her lung surgery on 9/13/2023, she feels like her shortness of breath has worsened.  She also is noticing some intermittent wheezing and she is now on 4 L of supplemental oxygen at all times.  At my last visit with her I had switched her over to Breztri from Advair discus; she was able to get the Breztri however she sparingly ever uses it.  Maybe a couple times a month on average per patient.  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  States that her previous Advair discus powdered inhaler did not seem to bother her mouth as bad.  The albuterol nebulizers and inhalers that she has she also uses sparingly due to mouth pain.     Essentially at this time, she has severe to very severe COPD and is not on any treatment at all; all secondary to burning mouth syndrome.  Due to the fact that her previous powdered Advair was less of a painful experience versus an aerosolized Breztri inhaler; my plan is to get her moved over to powdered Trelegy as triple inhaler therapy.  Dosing will only be once a day and patient seems encouraged that she would be able to tolerate this.  Stressed the importance of needing daily maintenance therapy for management of her COPD; especially after undergoing lung surgery and dealing with lung cancer.     Since last visit, patient has been diagnosed with non-small cell lung cancer.  Underwent a right lower lobe wedge resection on 9/13/2023 confirming the diagnosis.  She is now being followed by both thoracic surgery as well  "as medical oncology; last saw Dr. Escamilla on 10/25/2023.  Per Dr. Escamilla's last note she has stage IIIa lung cancer and was advised pulmonary rehab due to some significant post wedge resection debility.  Per Dr. Cho note from 10/10/2023; he is planning on seeing her back in 6 months with a repeat CT of her chest at that time.  According to chart review, it appears patient started pulmonary rehab on 11/20/2023 (previously ordered by Dr. Escamilla).    CAT TODAY: 25    5/8/23: Since last visit on 10/19/22 with Dr. Guerra, patient completed lung cancer screening CT that was previously ordered. Reviewed those results with her today. [facility] Chest CT scan from 4/27/23 showed a 7mm RLL lung nodule, mild to moderate upper lobe emphysema and some mild subpleural reticulations; suggestive sequela of smoking. Lung RADS 4A. Also recently completed an echocardiogram on 12/13/22 which showed preserved EF of 65-70% with LV diastolic dysfunction. Trace tricuspid and mitral regurg. No sign of pulmonary HTN. Last visit with Dr. Guerra, she was prescribed Incruse to pair with her Advair 250. However, appears like she never got the Incruse; states insurance did not cover it. Also is only using her Advair PRN due to \"burning mouth syndrome\". Using it maybe 3x a month currently. Has albuterol nebulizer and inhaler; using those treatments maybe 1-2x a week. She is on 3L of supplemental oxygen constantly. Has been on the oxygen for a little over a year. Wears at night also. She admits to an infrequent cough that is non productive. Admits to frequent wheezing. Denies SOB at rest. Does have BOTELLO; MMRC 3. Denies allergy issues. Denies GERD. Denies chest pain. Denies LLE but does have +orthopnea. Admits to snoring and  has noted abnormal breathing patterns. Has daytime fatigue and will often doze off easily. ESS today was 13.  ACT Today: 15  CAT Today: 28     Medical Hx  -COPD  -Chronic Hypoxic Respiratory Failure  -Hx of Hodgkins " Lymphoma (2008) s/p lumpectomy right axilla and chemo/radiation right axilla (Dr. Brar)  -HLD  -B12 deficiency  -Night Terrors  -Burning Mouth Syndrome    ** The below are notes from previous visits with Dr. 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 has albuterol prn. Did have breathing treatments in the hospital which did not cause this issue      Denies asthma as a kid     Denies waking up at night short of breath      c/o hoarse voice      Last PFT in 2015--mod obstruction      PMH/PSH: subclavian artery stenosis, lymphoma --s/p chemo/rads in 2015,   FH : adopted so unsure of family hx   SH : quit smoking in 2014, was smoking 1 ppd for 30 years. Used to work in a school as an aide. pets at home  REVIEW OF SYSTEMS     REVIEW OF SYSTEMS  Review of Systems   Constitutional:  Positive for appetite change and fatigue. Negative for activity change, chills, fever and unexpected weight change.        Claims night sweats   HENT:  Positive for congestion. Negative for postnasal drip, rhinorrhea, sinus pressure, sinus pain, sneezing, sore throat, trouble swallowing and voice change.    Eyes:  Negative for redness and itching.   Respiratory:  Positive for chest tightness, shortness of breath and wheezing. Negative for cough and stridor.     Cardiovascular:  Negative for chest pain, palpitations and leg swelling.        Denies orthopnea   Gastrointestinal:  Positive for abdominal pain and nausea. Negative for diarrhea and vomiting.        Denies acid reflux   Musculoskeletal:  Negative for arthralgias, back pain, joint swelling and myalgias.   Skin:  Negative for rash.   Allergic/Immunologic: Negative for immunocompromised state.   Neurological:  Positive for weakness and headaches. Negative for dizziness and tremors.   Hematological:  Does not bruise/bleed easily.   Psychiatric/Behavioral:  Negative for agitation and sleep disturbance. The patient is nervous/anxious.         Claims depression   All other systems reviewed and are negative.        ALLERGIES AND MEDICATIONS     ALLERGIES  Allergies   Allergen Reactions    Bupropion Other and Unknown    Buspirone Other    Cilostazol Unknown    Latex Other     BLISTER    Pseudoephedrine Other and Unknown    Nsaids (Non-Steroidal Anti-Inflammatory Drug) Palpitations       MEDICATIONS  Current Outpatient Medications   Medication Sig Dispense Refill    acetaminophen-codeine (Tylenol w/ Codeine #3) 300-30 mg tablet Take 1 tablet by mouth every 6 hours if needed.      albuterol 2.5 mg /3 mL (0.083 %) nebulizer solution Take 3 mL (2.5 mg) by nebulization every 4 hours if needed for wheezing.      albuterol 90 mcg/actuation aerosol powdr breath activated inhaler Inhale 2 puffs every 6 hours if needed for wheezing or shortness of breath.      atorvastatin (Lipitor) 20 mg tablet Take 1 tablet (20 mg) by mouth once daily.      BLACK COHOSH ORAL Take 2 tablets by mouth once daily in the morning.      cyanocobalamin (Vitamin B-12) 1,000 mcg/mL injection INJECT 1 ML INTRAMUSCULARLY ONCE EVERY MONTH. 3 mL 13    DULoxetine (Cymbalta) 60 mg DR capsule 1 capsule (60 mg) once every 24 hours.      fluticasone-umeclidin-vilanter (Trelegy Ellipta) 200-62.5-25 mcg blister with device Inhale 1 puff once daily. Rinse mouth with  water after use to reduce aftertaste and incidence of candidiasis. Do not swallow. 1 each 3    folic acid (Folvite) 1 mg tablet Take 1 tablet (1 mg) by mouth once daily.      gabapentin (Neurontin) 300 mg capsule Take 3 capsules (900 mg) by mouth 2 times a day.      levothyroxine (Synthroid, Levoxyl) 25 mcg tablet Take 1 tablet (25 mcg) by mouth once daily in the morning. Take before meals.      LORazepam (Ativan) 1 mg tablet Take 1.5 tablets (1.5 mg) by mouth 2 times a day.      promethazine (Phenergan) 25 mg tablet Take 1 tablet (25 mg) by mouth every 12 hours.      rOPINIRole (Requip) 1 mg tablet Take 1 tablet (1 mg) by mouth once daily at bedtime.      tiZANidine (Zanaflex) 4 mg tablet 1 tablet as needed Orally Three times a day      topiramate (Topamax) 100 mg tablet Take 1 tablet (100 mg) by mouth once daily.       No current facility-administered medications for this visit.       PAST HISTORY     PAST MEDICAL HISTORY  She  has a past medical history of Age-related nuclear cataract, bilateral (12/07/2015), Age-related nuclear cataract, right eye (04/01/2016), Aphakia, left eye (01/21/2016), COPD (chronic obstructive pulmonary disease) (CMS/[facility]), Cortical age-related cataract, bilateral (12/07/2015), Hodgkin lymphoma, unspecified, unspecified site (CMS/[facility]) (12/04/2013), Hypermetropia, bilateral (04/01/2016), Other chest pain (07/16/2021), Other conditions influencing health status (12/07/2015), Other postherpetic nervous system involvement (07/16/2021), Personal history of other diseases of the respiratory system, Personal history of other infectious and parasitic diseases (07/16/2021), Personal history of pneumonia (recurrent) (08/07/2014), Posterior subcapsular polar age-related cataract, right eye (04/01/2016), and Presence of intraocular lens (01/21/2016).    PAST SURGICAL HISTORY  Past Surgical History:   Procedure Laterality Date    CATARACT EXTRACTION  02/24/2016    Cataract Surgery    HYSTERECTOMY   "09/18/2014    Hysterectomy    STRABISMUS SURGERY  12/07/2015    Strabismus Surgery       IMMUNIZATION HISTORY  Immunization History   Administered Date(s) Administered    Flu vaccine (IIV4), preservative free *Check age/dose* 11/02/2016, 09/25/2018, 10/04/2019, 10/07/2021    Flu vaccine, quadrivalent, high-dose, preservative free, age 65y+ (FLUZONE) 10/05/2022, 09/26/2023    Flu vaccine, quadrivalent, no egg protein, age 6 month or greater (FLUCELVAX) 10/05/2020    Influenza, injectable, quadrivalent 10/20/2017    Influenza, seasonal, injectable 11/01/2012, 10/02/2015    Influenza, seasonal, injectable, preservative free 09/30/2013    Moderna COVID-19 vaccine, bivalent, blue cap/gray label *Check age/dose* 11/09/2022    Moderna SARS-CoV-2 Vaccination 03/09/2021, 04/20/2021, 12/06/2021    Novel influenza-H1N1-09, preservative-free 12/22/2009    Pneumococcal conjugate vaccine, 20-valent (PREVNAR 20) 09/26/2023       SOCIAL HISTORY  She  reports that she quit smoking about 10 years ago. Her smoking use included cigarettes. She started smoking about 49 years ago. She has a 37.00 pack-year smoking history. She has never been exposed to tobacco smoke. She has never used smokeless tobacco. She reports that she does not currently use alcohol. She reports that she does not currently use drugs.     FAMILY HISTORY  Family History   Adopted: Yes       PHYSICAL EXAM     VITAL SIGNS: /77   Pulse (!) 113   Ht 1.575 m (5' 2\")   Wt 67.1 kg (148 lb)   SpO2 97% Comment: on 4 LPM O2  BMI 27.07 kg/m²      PREVIOUS WEIGHTS:  Wt Readings from Last 3 Encounters:   03/05/24 67.1 kg (148 lb)   12/06/23 68.6 kg (151 lb 5.5 oz)   12/01/23 67.1 kg (148 lb)       Physical Exam  Vitals reviewed.   Constitutional:       General: She is not in acute distress.     Appearance: Normal appearance. She is not ill-appearing or toxic-appearing.   HENT:      Head: Normocephalic.      Nose: No rhinorrhea.   Cardiovascular:      Rate and Rhythm: " Normal rate and regular rhythm.      Heart sounds: Normal heart sounds.   Pulmonary:      Effort: Pulmonary effort is normal. No respiratory distress.      Breath sounds: Normal breath sounds. No stridor. No wheezing, rhonchi or rales.      Comments: Slightly diminished lung sounds all over; otherwise - clear.  Abdominal:      General: Abdomen is flat.   Musculoskeletal:         General: No swelling. Normal range of motion.   Skin:     General: Skin is warm and dry.      Nails: There is no clubbing.   Neurological:      General: No focal deficit present.      Mental Status: She is alert.   Psychiatric:         Mood and Affect: Mood normal.         Behavior: Behavior normal.         Judgment: Judgment normal.       RESULTS/DATA     Pulmonary Function Test Results   PFT  -22: FEV1/FVC: 49, FEV1: 0.91 (41%), FVC: 1.85 (64%), +BD response, XAI59-38: 15%, TLC: 3.69 (83%), DLCO: 31%  -4/14/15: FEV1/FVC: 49, FEV1: 1.49 (61%), FVC: 3.02 (97%), no BD response, OEZ35-05: 17%, T.43 (98%), DLCO: 31%        Chest Radiograph     XR chest 2 views 10/10/2023    Narrative  Interpreted By:  Geoff Franklin,  STUDY:  XR CHEST 2 VIEWS;  10/10/2023 2:07 pm    INDICATION:  Signs/Symptoms:FU.    COMPARISON:  Chest radiograph 2023    ACCESSION NUMBER(S):  [id number]    ORDERING CLINICIAN:  BROOKE FARNSWORTH    FINDINGS:      CARDIOMEDIASTINAL SILHOUETTE:  Cardiomediastinal silhouette is unchanged.    LUNGS:  No consolidation, pneumothorax, or effusion. Flattened hemidiaphragms  bibasilar pleuroparenchymal thickening suggestive of COPD.    ABDOMEN:  No remarkable upper abdominal findings.    BONES:  No acute osseous changes.    Remaining findings appear stable since prior comparison radiograph.    Impression  1.  No evidence of acute cardiopulmonary process.      Signed by: Geoff Franklin 10/11/2023 8:19 PM  Dictation workstation:   [id number]      Chest CT Scan   Lung CA Screening Chest CT  23: IMPRESSION: 1.  There is a suspicious, growing superior segment right lower lobe lung nodule. Concerning bronchogenic carcinoma. Recommend PET-CT scanning and interventional pulmonology or thoracic surgery consultation. LUNG RADS CATEGORY: Lung-RADS 4X  -4/27/23: 1. There is a 7 mm right lower lobe nodule. Comparison to prior study is limited due to presence of motion and infiltrate on the prior study, however this nodule is likely new compared to prior study. Recommend short-term follow-up chest CT in 3 months. 2. Mild to moderate upper lung predominant emphysema. Subpleural reticulation in the lungs, likely sequela of smoking. 3. Mild coronary artery calcification. LUNG RADS CATEGORY: Lung-RADS 4A    Chest CT  3/4/24: IMPRESSION: 1.  Scar from wedge resection in the right lower lobe appears unchanged 2. Minimal pleural fluid  on the right 3. No interval developing nodules or infiltrates  11/13/23: IMPRESSION: 1. Status post wedge resection right lower lobe superior segment with postoperative changes present consisting of fibrotic stranding, mild bronchial dilatation in the lower lobe and pleural thickening. 2. Right paratracheal lymphadenopathy for which follow-up is recommended. Size of one right paratracheal lymph node has increased from previous examination. 3. Remaining mediastinal lymph nodes unchanged in size. 4. COPD.    PET CT  8/14/23: IMPRESSION: 1. Hypermetabolic right lower lobe nodule is highly concerning for malignancy. Recommend tissue biopsy for further evaluation. 2. No evidence of hypermetabolic lymphadenopathy or metastatic disease.    Echocardiogram & Cardiac Studies     Echocardiogram     [facility], [address] [place] [address]  Tel [phone] and Fax [phone]    TRANSTHORACIC ECHOCARDIOGRAM REPORT      Patient Name:     MARLA JUNG  Reading Physician:   [id number] Sam Bolton MD  Study Date:       12/13/2022   Referring Physician: Cori Guerra  MRN/PID:           [id number]     PCP:  Accession/Order#: [id number] Department Location: [facility] Non Invasive  YOB: 1955    Fellow:  Gender:           F            Nurse:  Admit Date:                    Sonographer:         Gabrielle Frank [facility]  Admission Status: Outpatient   Additional Staff:  Height:           157.48 cm    CC Report to:        Cori Guerra MD  Weight:           70.31 kg     Study Type:          Echocardiogram  BSA:              1.72 m2  Blood Pressure: 126 /80 mmHg    Diagnosis/ICD: R06.00-Dyspnea, unspecified  Indication:    Dyspnea  Procedure/CPT: Echo Complete w Full [id number]    Patient History:  Smoker:            Former.  Pertinent History: COPD and Dyspnea.    Study Detail: The following Echo studies were performed: 2D, M-Mode, Doppler and  color flow.      PHYSICIAN INTERPRETATION:  Left Ventricle: The left ventricular systolic function is hyperdynamic, with an estimated ejection fraction of 65-70%. There are no regional wall motion abnormalities. The left ventricular cavity size is decreased. Spectral Doppler shows an impaired relaxation pattern of left ventricular diastolic filling.  Left Atrium: The left atrium is normal in size.  Right Ventricle: The right ventricle is normal in size. There is normal right ventricular global systolic function.  Right Atrium: The right atrium is normal in size.  Aortic Valve: The aortic valve was not well visualized. There is no evidence of aortic valve stenosis.  There is no evidence of aortic valve regurgitation. The peak instantaneous gradient of the aortic valve is 11.2 mmHg. The mean gradient of the aortic valve is 5.9 mmHg.  Mitral Valve: The mitral valve is normal in structure. There is trace mitral valve regurgitation.  Tricuspid Valve: The tricuspid valve is structurally normal. There is trace tricuspid regurgitation.  Pulmonic Valve: The pulmonic valve is not well visualized. The pulmonic valve regurgitation was not well  visualized.  Pericardium: There is no pericardial effusion noted.  Aorta: The aortic root is normal.  Pulmonary Artery: The tricuspid regurgitant velocity is 2.00 m/s, and with an estimated right atrial pressure of 3 mmHg, the estimated pulmonary artery pressure is normal with the RVSP at 19.0 mmHg.  Systemic Veins: The inferior vena cava size appears small.      CONCLUSIONS:  1. Left ventricular systolic function is hyperdynamic with a 65-70% estimated ejection fraction.  2. Spectral Doppler shows an impaired relaxation pattern of left ventricular diastolic filling.  3. Left ventricular cavity size is decreased.  4. There is trace mitral and tricuspid regurgitation.    Labwork & Pathology   Complete Blood Count  Lab Results   Component Value Date    WBC 10.2 09/17/2023    HGB 13.6 09/17/2023    HCT 43.1 09/17/2023    MCV 91 09/17/2023     09/17/2023       Peripheral Eosinophil Count:   Eosinophils Absolute (x10E9/L)   Date Value   09/01/2022 0.17   04/25/2022 0.08   10/31/2017 0.15       Metabolic Parameters  Sodium   Date/Time Value Ref Range Status   09/17/2023 07:47  136 - 145 mmol/L Final     Potassium   Date/Time Value Ref Range Status   09/17/2023 07:47 AM 3.8 3.5 - 5.3 mmol/L Final     Chloride   Date/Time Value Ref Range Status   09/17/2023 07:47 AM 93 (L) 98 - 107 mmol/L Final     Bicarbonate   Date/Time Value Ref Range Status   09/17/2023 07:47 AM 34 (H) 21 - 32 mmol/L Final     Anion Gap   Date/Time Value Ref Range Status   09/17/2023 07:47 AM 17 10 - 20 mmol/L Final     Urea Nitrogen   Date/Time Value Ref Range Status   09/17/2023 07:47 AM 10 6 - 23 mg/dL Final     Creatinine   Date/Time Value Ref Range Status   09/17/2023 07:47 AM 0.84 0.50 - 1.05 mg/dL Final     GFR Female   Date/Time Value Ref Range Status   09/17/2023 07:47 AM 77 >90 mL/min/1.73m2 Final     Comment:      CALCULATIONS OF ESTIMATED GFR ARE PERFORMED   USING THE 2021 CKD-EPI STUDY REFIT EQUATION   WITHOUT THE RACE VARIABLE  "FOR THE IDMS-TRACEABLE   CREATININE METHODS.    https://jasn.asnjournals.org/content/early/2021/09/22/ASN.0884896387       Glucose   Date/Time Value Ref Range Status   09/17/2023 07:47 AM 99 74 - 99 mg/dL Final     Calcium   Date/Time Value Ref Range Status   09/17/2023 07:47 AM 9.4 8.6 - 10.6 mg/dL Final     Phosphorus   Date/Time Value Ref Range Status   09/16/2023 04:29 PM 2.3 (L) 2.5 - 4.9 mg/dL Final     Comment:      The performance characteristics of phosphorus testing in   heparinized plasma have been validated by the individual     laboratory site where testing is performed. Testing    on heparinized plasma is not approved by the FDA;    however, such approval is not necessary.       AST   Date/Time Value Ref Range Status   09/01/2022 12:00 PM 22 9 - 39 U/L Final     ALT (SGPT)   Date/Time Value Ref Range Status   09/01/2022 12:00 PM 28 7 - 45 U/L Final     Comment:      Patients treated with Sulfasalazine may generate    falsely decreased results for ALT.         Coagulation Parameters  Protime   Date/Time Value Ref Range Status   09/15/2023 07:56 AM 11.3 9.8 - 12.8 sec Final     Comment:     Note new reference range as of 6/20/2023 at 10:00am.     INR   Date/Time Value Ref Range Status   09/15/2023 07:56 AM 1.0 0.9 - 1.1 Final       Serum Immunoglobulin E:    No results found for: \"IGE\"     Pathology  9/13/23: FINAL DIAGNOSIS   A.  RIGHT LUNG, LOWER LOBE, PULMONARY WEDGE RESECTION:   -- INVASIVE ACINAR ADENOCARCINOMA (1.8 CM) INVOLVING LUNG PARENCHYMA   B.  LEVEL 8 LYMPH NODE, EXCISION:  -- ONE (1) LYMPH NODE NEGATIVE FOR TUMOR.  C.  LEVEL 9 LYMPH NODE, EXCISION:  -- ONE (1) LYMPH NODE NEGATIVE FOR TUMOR.  D.  LEVEL 7 LYMPH NODE, EXCISION:  -- METASTATIC CARCINOMA TO FRAGMENTS OF LYMPH NODE(S).  E.  LEVEL 10 LYMPH NODE, EXCISION:  -- FRAGMENTS OF LYMPH NODE(S) NEGATIVE FOR TUMOR.  F.  LEVEL 4R LYMPH NODE, EXCISION:  -- FRAGMENTS OF LYMPH NODE(S) NEGATIVE FOR TUMOR.  Bronchoscopy & Sputum Cultures "       ASSESSMENT/PLAN     Ms. Plummer is a 68 y.o. female; was referred to the [facility] for follow up of COPD and hypoxic respiratory failure.    Problem List and Orders  Diagnoses and all orders for this visit:  Chronic obstructive pulmonary disease, unspecified COPD type (CMS/HCC)  Chronic respiratory failure with hypoxia (CMS/HCC)  Adenocarcinoma of right lung (CMS/HCC)        Assessment and Plan / Recommendations:  1. Severe COPD: Most recent PFT from 8/29/22 showing GOLD 3 Severe COPD with FEV1 of 41%, +BD response and DLCO at 31%. 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 Was also previously prescribed Incruse for triple therapy; but not covered by insurance. Infrequent nonproductive cough with frequent wheezing. Was able to get Trelegy 200 but very infrequently uses it. Provided more education to her today about the importance of compliance with daily maintenance inhaler therapy  -Continue Trelegy 200; 1 inhalation once a day.  Rinse mouth after use.   -On 3/5/24 visit; admits to seldom ever using the Trelegy (maybe once a week if that). Reeducated her about this inhaler and how/why it needs to be used.  - Continue Albuterol Nebs & HFA PRN (states that the nebulized medications also affect her mouth pretty significantly)  -Completed about 19-20 sessions of pulmonary rehab     2. Chronic Hypoxic Respiratory Failure: Was admitted for a COPD exacerbation 4/2022 and was discharged on 2L supplemental oxygen.   -Since her lung surgery; she is now on 4 L of supplemental oxygen at all times  -Form filled out today (3/5/24) for [employer]     3. NSCLC: Patient completed a lung cancer screening chest CT on 4/27/2023 which showed a 7 mm right lower lobe nodule; surrounding this nodularity appeared GGO; very inflammatory looking. Lung RADS 4A; advising 3-month follow-up. Most recent Chest CT from 3/4/24 shows stable findings without  concern of new nodules.  -Diagnosed with lung cancer on 9/13/2023 after having a right lower lobe wedge resection  -Continue to follow with thoracic surgery  -Continue to follow with medical oncology     4. Suspected BLAKE: Patient admits to frequent snoring and  has noticed abnormal breathing patterns while asleep. Patient admits to significant daytime fatigue and will often doze off easily. ESS today was 13. Has never completed a sleep study.  - Currently established with sleep medicine    RTC 6 months

## 2024-03-05 ENCOUNTER — OFFICE VISIT (OUTPATIENT)
Dept: PULMONOLOGY | Facility: CLINIC | Age: 69
End: 2024-03-05
Payer: MEDICARE

## 2024-03-05 VITALS
HEART RATE: 113 BPM | WEIGHT: 148 LBS | HEIGHT: 62 IN | OXYGEN SATURATION: 97 % | BODY MASS INDEX: 27.23 KG/M2 | SYSTOLIC BLOOD PRESSURE: 122 MMHG | DIASTOLIC BLOOD PRESSURE: 77 MMHG

## 2024-03-05 DIAGNOSIS — C34.91 ADENOCARCINOMA OF RIGHT LUNG (MULTI): ICD-10-CM

## 2024-03-05 DIAGNOSIS — J44.9 CHRONIC OBSTRUCTIVE PULMONARY DISEASE, UNSPECIFIED COPD TYPE (MULTI): Primary | ICD-10-CM

## 2024-03-05 DIAGNOSIS — J96.11 CHRONIC RESPIRATORY FAILURE WITH HYPOXIA (MULTI): ICD-10-CM

## 2024-03-05 PROCEDURE — 1160F RVW MEDS BY RX/DR IN RCRD: CPT | Performed by: NURSE PRACTITIONER

## 2024-03-05 PROCEDURE — 3008F BODY MASS INDEX DOCD: CPT | Performed by: NURSE PRACTITIONER

## 2024-03-05 PROCEDURE — 99214 OFFICE O/P EST MOD 30 MIN: CPT | Performed by: NURSE PRACTITIONER

## 2024-03-05 PROCEDURE — 1159F MED LIST DOCD IN RCRD: CPT | Performed by: NURSE PRACTITIONER

## 2024-03-05 PROCEDURE — 1036F TOBACCO NON-USER: CPT | Performed by: NURSE PRACTITIONER

## 2024-03-05 PROCEDURE — 1125F AMNT PAIN NOTED PAIN PRSNT: CPT | Performed by: NURSE PRACTITIONER

## 2024-03-05 ASSESSMENT — PATIENT HEALTH QUESTIONNAIRE - PHQ9
SUM OF ALL RESPONSES TO PHQ9 QUESTIONS 1 & 2: 0
2. FEELING DOWN, DEPRESSED OR HOPELESS: NOT AT ALL
1. LITTLE INTEREST OR PLEASURE IN DOING THINGS: NOT AT ALL

## 2024-03-05 ASSESSMENT — ENCOUNTER SYMPTOMS
WEAKNESS: 1
HEADACHES: 1
CHEST TIGHTNESS: 1

## 2024-03-05 ASSESSMENT — LIFESTYLE VARIABLES: HOW MANY STANDARD DRINKS CONTAINING ALCOHOL DO YOU HAVE ON A TYPICAL DAY: PATIENT DOES NOT DRINK

## 2024-03-05 ASSESSMENT — PAIN SCALES - GENERAL: PAINLEVEL: 5

## 2024-03-05 NOTE — PATIENT INSTRUCTIONS
Today we discussed your current symptoms and plan moving forward.    -Continue Trelegy 200; 1 inhalation once a day.  Rinse mouth after use.  This will be your new long-acting daily maintenance inhaler to better control your COPD.   -Continue albuterol as needed  -Continue 4 L of oxygen at all times  -Continue attending pulmonary rehab; this will be very beneficial in helping build back your stamina  -Continue following with Dr. Cho from thoracic surgery  -Continue following with Dr. Escamilla from oncology    Thank you for visiting the pulmonary clinic today! It was a pleasure to participate in your care.  Please return to clinic 6 months or sooner if needed.    Bry Sykes, CNP  My Office Number: [phone]   CT Scheduling: [phone]  PFT/Follow Up Visit Scheduling: [phone]  My Nurse: CANDIS Joiner  My Dutton: Marcelina    **For immediate needs such as medication issues/refills, active sick symptoms/medical concerns; I ask that you please call the office and speak to the pulmonary nurse. MyChart messages do not come directly to me. There can sometimes be a delay before I am aware of any messages that were sent. Thank you.**

## 2024-03-06 ENCOUNTER — OFFICE VISIT (OUTPATIENT)
Dept: HEMATOLOGY/ONCOLOGY | Facility: CLINIC | Age: 69
End: 2024-03-06
Payer: MEDICARE

## 2024-03-06 VITALS
SYSTOLIC BLOOD PRESSURE: 112 MMHG | OXYGEN SATURATION: 98 % | WEIGHT: 147.27 LBS | HEART RATE: 105 BPM | DIASTOLIC BLOOD PRESSURE: 62 MMHG | BODY MASS INDEX: 26.94 KG/M2 | TEMPERATURE: 95.7 F | RESPIRATION RATE: 18 BRPM

## 2024-03-06 DIAGNOSIS — Z08 ENCOUNTER FOR FOLLOW-UP SURVEILLANCE OF LUNG CANCER: ICD-10-CM

## 2024-03-06 DIAGNOSIS — Z85.118 ENCOUNTER FOR FOLLOW-UP SURVEILLANCE OF LUNG CANCER: ICD-10-CM

## 2024-03-06 DIAGNOSIS — C34.91: Primary | ICD-10-CM

## 2024-03-06 PROCEDURE — 1159F MED LIST DOCD IN RCRD: CPT | Performed by: STUDENT IN AN ORGANIZED HEALTH CARE EDUCATION/TRAINING PROGRAM

## 2024-03-06 PROCEDURE — 1125F AMNT PAIN NOTED PAIN PRSNT: CPT | Performed by: STUDENT IN AN ORGANIZED HEALTH CARE EDUCATION/TRAINING PROGRAM

## 2024-03-06 PROCEDURE — 99214 OFFICE O/P EST MOD 30 MIN: CPT | Performed by: STUDENT IN AN ORGANIZED HEALTH CARE EDUCATION/TRAINING PROGRAM

## 2024-03-06 ASSESSMENT — PAIN SCALES - GENERAL: PAINLEVEL: 6

## 2024-03-06 NOTE — PROGRESS NOTES
"[facility]   Thoracic Oncology Follow-up Visit - [place]     Patient ID: Fatmata Plummer is a 68 y.o. female.  Primary Care Provider: Soren Covarrubias DO      Diagnosis: Stage III adenocarcinoma of lung, right (CMS/HCC) [C34.91]      Cancer Staging   Adenocarcinoma of lung (CMS/HCC)  Staging form: Lung, AJCC 8th Edition  - Pathologic stage from 9/13/2023: Stage IIIA (pT1b, pN2, cM0) - Signed by Olivia Escamilla MD on 11/2/2023     Molecular Studies    PDL 1  5%   NGS KRAS G12c     Oncologic History:  2009 - Stage IA Nodular Sclerosis Hodgkin Lymphoma s/p 4 cycles ABVD and IFRT. Treated by Dr. Mark Brar.    4/2023 - Right Lower Lobe lung nodule noted on lung cancer screening CT scan, growing on follow-up CT 3 months later     9/13/23 Right lower lobe wedge resection by Dr. Cho     Initial Presentation:  10/25/2023 Patient presents in a wheelchair accompanied by her . She is noticeably tired, reports she did not sleep much last night due to anxiety about the visit. She listens and participates, but her  does most of the talking.    They are aware of the lung cancer diagnosis which was reviewed by Dr. Cho and by Dr. Cao. They understood from Dr. Cho that adjuvant therapy (chemo) could be considered, but would have to be weight against her other co-morbidities. They come today for a second opinion.    Prior to surgery she was able to do ADLs in the home and accompany her  for grocery shopping. Since the surgery, she has great difficulty with activity, she feels winded even with short walks inside the house and needs help with dressing and bathing.     PMH: Mrs. Plummer has severe COPD, follows with Pulmonary and requires continuous use of oxygen, 4L/min.    HPI   Chief Concern: \"I'm doing well, using 4L oxygen\"    Interval History:   Fatmata presents with her  for follow-up visit and scan review.   She is much more alert and engaged than last visit. Her "  states she is getting back to her usual activities. She still uses oxygen 4L/min which has been stable for several years.      Review of Systems - 10 systems reviewed and negative unless noted.  Pertinent Positive: dyspnea with exertion    Pertinent Negatives:  nausea  vomiting  diarrhea  constipation  difficulty swallowing  headache, vision change, hearing change, tinnitus,   chest pain,   abdominal pain,   numbness/tingling in fingers or toes  fevers, chills, sick contacts.  insomnia, mood changes    Meds (Current):  Current Outpatient Medications   Medication Instructions    acetaminophen-codeine (Tylenol w/ Codeine #3) 300-30 mg tablet 1 tablet, oral, Every 6 hours PRN    albuterol 90 mcg/actuation aerosol [facility] breath activated inhaler 2 puffs, inhalation, Every 6 hours PRN    albuterol 2.5 mg, nebulization, Every 4 hours PRN    atorvastatin (LIPITOR) 20 mg, oral, Daily    BLACK COHOSH ORAL 2 tablets, oral, Every morning    cyanocobalamin (VITAMIN B-12) 1,000 mcg, intramuscular, Every 30 days    DULoxetine (Cymbalta) 60 mg DR capsule 1 capsule, Every 24 hours    fluticasone-umeclidin-vilanter (Trelegy Ellipta) 200-62.5-25 mcg blister with device 1 puff, inhalation, Daily, Rinse mouth with water after use to reduce aftertaste and incidence of candidiasis. Do not swallow.    folic acid (FOLVITE) 1 mg, oral, Daily    gabapentin (Neurontin) 300 mg capsule 3 capsules, oral, 2 times daily    levothyroxine (SYNTHROID, LEVOXYL) 25 mcg, oral, Daily before breakfast    LORazepam (ATIVAN) 1.5 mg, oral, 2 times daily,      promethazine (PHENERGAN) 25 mg, oral, Every 12 hours    rOPINIRole (REQUIP) 1 mg, oral, Nightly    tiZANidine (Zanaflex) 4 mg tablet  1 tablet as needed Orally Three times a day    topiramate (TOPAMAX) 100 mg, oral, Daily       Allergies   Allergen Reactions    Bupropion Other and Unknown    Buspirone Other    Cilostazol Unknown    Latex Other     BLISTER    Pseudoephedrine Other and Unknown     Nsaids (Non-Steroidal Anti-Inflammatory Drug) Palpitations       Objective   BSA: 1.71 meters squared    Visit Vitals  /62 (BP Location: Left arm, Patient Position: Sitting, BP Cuff Size: Adult long)   Pulse 105   Temp 35.4 °C (95.7 °F) (Temporal)   Resp 18   Wt 66.8 kg (147 lb 4.3 oz)   SpO2 98%   BMI 26.94 kg/m²   OB Status Hysterectomy   Smoking Status Former   BSA 1.71 m²        Performance Status:  (2) Ambulatory and capable of self care, unable to carry out work activity, up and about > 50% or waking hours     Physical Exam    General: Pleasant woman, appears stated age, well-groomed in street clothes, Alert and oriented, actively participates in visit  Head: Hair present, fully covering scalp. Symmetric facial expressions  Eyes: PERRL, EOMI, clear sclera, eyebrows present.  Ears/Nose/Mouth/Throat:  Oral mucous membranes moist. No oral ulcers. No palpable pre/post-auricular lymph nodes  Neck: No palpable cervical chain lymph nodes  Respiratory: nasal cannula in place, oxygen flow 4L/min. Patient able to maintain conversation without stopping to breathe. Good air movement in all lung fields, clear to auscultation  Cardio: Regular rate and rhythm, normal S1 and S2, radial pulses symmetric  GI: Nondistended, soft, non-tender abdomen  Musculoskeletal: Normal muscle bulk and tone, ROM intact, no joint swelling.  Seated in wheelchair, feels too weak to stand/walk.  Extremities: No ankle swelling, no arm or leg wounds, clubbing on nails  Neuro: Alert, cognition intact, speech normal. Facial expressions symmetric.  No motor deficits noted. Sensation intact to touch and hot/cold.   Moves arms and legs against resistance while seated in wheelchair. Does not feel strong enough to stand or walk.  Psychological: Appropriate mood and behavior.  Skin: Warm and dry, no lesions, no rashes    Results:  Labs: no new labs for this visit.    Imaging:  I have personally reviewed the below imaging and concur with the reported  findings unless otherwise stated:    11/28/2023 CT chest wo IV contrast  -post- surgical changes noted in R lower lobe segment  -right paratracheal lymph nodes slightly enlarged      8/14/2023 PET CT  IMPRESSION:  1. Hypermetabolic right lower lobe nodule is highly concerning for  malignancy. Recommend tissue biopsy for further evaluation.  2. No evidence of hypermetabolic lymphadenopathy or metastatic disease.      Pathology:  Accession #: [id number]            Pathologist:                   KAITLIN PANDEY MD  Date of Procedure:    9/13/2023  Date Received:          9/13/2023  Date Reported           9/20/2023  Submitting Physician:   BROOKE FARNSWORTH MD  Location:                    [facility]  Other External #    Procedures/Addenda Present  FINAL DIAGNOSIS  A.  RIGHT LUNG, LOWER LOBE, PULMONARY WEDGE RESECTION:  -- INVASIVE ACINAR ADENOCARCINOMA (1.8 CM) INVOLVING LUNG PARENCHYMA; SEE NOTE AND SYNOPTIC REPORT.    NOTE: Additional immunostain for PD-L1 and next generation sequencing (NGS) will be performed and reported in addenda.    B.  LEVEL 8 LYMPH NODE, EXCISION:  -- ONE (1) LYMPH NODE NEGATIVE FOR TUMOR.    C.  LEVEL 9 LYMPH NODE, EXCISION:  -- ONE (1) LYMPH NODE NEGATIVE FOR TUMOR.    D.  LEVEL 7 LYMPH NODE, EXCISION:  -- METASTATIC CARCINOMA TO FRAGMENTS OF LYMPH NODE(S).    E.  LEVEL 10 LYMPH NODE, EXCISION:  -- FRAGMENTS OF LYMPH NODE(S) NEGATIVE FOR TUMOR.    F.  LEVEL 4R LYMPH NODE, EXCISION:  -- FRAGMENTS OF LYMPH NODE(S) NEGATIVE FOR TUMOR.    CASE SUMMARY REPORT       SPECIMEN  Procedure:      Wedge resection  Specimen Laterality:      Right  TUMOR  Tumor Focality:      Single focus  Tumor Site:      Lower lobe of lung     Tumor Size     Total Tumor Size (size of entire tumor):      Greatest Dimension  (Centimeters): 1.8 cm  Histologic Type:      Invasive acinar adenocarcinoma  Visceral Pleura Invasion:      Not identified  Direct Invasion of Adjacent Structures:      Not applicable (no  adjacent  structures present)  Treatment Effect:      No known presurgical therapy  Lymphovascular Invasion:      Not identified  MARGINS  Margin Status for Invasive Carcinoma:      All margins negative for invasive  carcinoma   Closest Margin(s) to Invasive Carcinoma:        Parenchymal  Margin Status for Non-Invasive Tumor:      Not applicable  REGIONAL LYMPH NODES  Lymph Node(s) from Prior Procedures:      No known prior lymph node sampling  performed  Regional Lymph Node Status:        Tumor present in regional lymph node(s)     Number of Lymph Nodes with Tumor:          At least: 1     Kassidy Site(s) with Tumor:          7: Subcarinal   Number of Lymph Nodes Examined:        At least: 5    [place]idy Site(s) Examined:         4R: Lower paratracheal          8R: Para-esophageal (below amarilys)          9R: Pulmonary ligament          10R: Hilar          7: Subcarinal  PATHOLOGIC STAGE CLASSIFICATION (pTNM, AJCC 8th Edition)  pT Category:      pT1b  pN Category:      pN2  Representative Blocks:      Normal Block: A8       Tumor Block: A3     Addendum Diagnosis   PD-L1 22C3  by Immunohistochemistry with Interpretation, pembrolizumab   Block used:  A3   Interpretation: Low Expression   Tumor Proportion Score (TPS): 5 %     Addendum Diagnosis   TEST: Focused Solid Tumor DNA/RNA Panel   SPECIMEN: FFPE, LUNG, RIGHT LOWER LOBE, [id number] A3   DISEASE DIAGNOSIS: Adenocarcinoma   Estimated Tumor Content: 40%   COLLECTION DATE: 9/13/2023   RECEIVED DATE: 9/22/2023   REPORT DATE: 09/27/2023     MICROSATELLITE STATUS: Microsatellite Instability-High (MSI-H) is NOT DETECTED.     DISEASE ASSOCIATED GENOMIC FINDINGS:   KRAS p.G12C (NM_033360 c.34G>T)   TP53 p.R249W (NM_000546 c.745A>T)     Surgical Pathology (reviewed in [facility])  Accession #: [id number]  Date of Procedure:  6/25/2009  Pathologist:  Santos ESPINOSA Reported: 6/25/2009  Submitting Physician: JAILYN MCKENZIE D.O.    FINAL DIAGNOSIS  A.  RIGHT AXILLARY LYMPH NODE,  [id number] (6/3/09):HODGKIN'S LYMPHOMA NODULAR SCLEROSIS TYPE.    Microscopic Description: Sections show lymph node with partial involvement by Hodgkin's Lymphoma.  Thereare syncytia of Sacha-Andres cells in an appropriate mixed cellularbackground and focal sclerosis. Immunostain for LCA, fascin, CD15, CD20, CD30, and CD3 are consistent withHodgkin's Lymphoma.      Assessment/Plan      Fatmata Plummer is a 68 y.o. female with prior stage IA Hodgkin lymphoma (R axilla) treated in 2009 by Dr. Brar, severe COPD requiring oxygen ,and recent diagnosis of pT1bN2 adenocarcinoma of the Right lower lobe, surgically resected 9/13/2023 by Dr. Cho.     She has Stage IIIA lung cancer, which has a recurrence risk of about 50% over 5 years. Adjuvant chemotherapy and immunotherapy is typically recommended to decrease the risk of recurrence. However, she has several medical co-morbidities and decreased performance status (ECOG 3) that put her at higher risk for complications from adjuvant therapy. Extensive conversation at 10/25/2023 visit with shared decision-making for surveillance strategy.    Surveillance imaging is recommended to monitor for disease recurrence with CT Chest every 3-6 months in the first 2 years, then yearly for a total of 5 years.    Today 3/6, I reviewed results of CT chest with which shows stable post-surgical changes in Right upper lobe and small R pleural effusion. No lymphandenopathy.  No new pulmonary nodules    Continue with surveillance CT chest wo contrast in 6 months.    MRI Brain - no intracranial tumors. Repeat yearly (or sooner if CNS symptoms occur).    Severe COPD  Following with Pulmonary, starting Trelegy inhaler    She completed Pulmonary Rehab.     Patient and her  asked several questions, which I answered to their satisfaction.      Time spent face to face with patient: 20 minutes     Olivia Escamilla MD  [facility]

## 2024-03-06 NOTE — PROGRESS NOTES
"Patient here today for follow up with her . New RUQ pain stating one week ago, just under her surgical scar.      Fatigue: energy level \"not the greatest\"  PO intake: denies any issues  Weight: stable  N/V/D/C: daily nausea, relieved with medication    Medications reviewed with patient.           "

## 2024-04-16 ENCOUNTER — OFFICE VISIT (OUTPATIENT)
Dept: CARDIAC SURGERY | Facility: CLINIC | Age: 69
End: 2024-04-16
Payer: MEDICARE

## 2024-04-16 VITALS
WEIGHT: 148 LBS | HEART RATE: 102 BPM | DIASTOLIC BLOOD PRESSURE: 58 MMHG | BODY MASS INDEX: 27.23 KG/M2 | OXYGEN SATURATION: 91 % | RESPIRATION RATE: 18 BRPM | HEIGHT: 62 IN | SYSTOLIC BLOOD PRESSURE: 106 MMHG

## 2024-04-16 DIAGNOSIS — C34.91 ADENOCARCINOMA OF RIGHT LUNG (MULTI): Primary | ICD-10-CM

## 2024-04-16 PROCEDURE — 1159F MED LIST DOCD IN RCRD: CPT | Performed by: THORACIC SURGERY (CARDIOTHORACIC VASCULAR SURGERY)

## 2024-04-16 PROCEDURE — 1160F RVW MEDS BY RX/DR IN RCRD: CPT | Performed by: THORACIC SURGERY (CARDIOTHORACIC VASCULAR SURGERY)

## 2024-04-16 PROCEDURE — 99215 OFFICE O/P EST HI 40 MIN: CPT | Performed by: THORACIC SURGERY (CARDIOTHORACIC VASCULAR SURGERY)

## 2024-04-16 PROCEDURE — 3008F BODY MASS INDEX DOCD: CPT | Performed by: THORACIC SURGERY (CARDIOTHORACIC VASCULAR SURGERY)

## 2024-04-16 PROCEDURE — 1126F AMNT PAIN NOTED NONE PRSNT: CPT | Performed by: THORACIC SURGERY (CARDIOTHORACIC VASCULAR SURGERY)

## 2024-04-16 PROCEDURE — 1036F TOBACCO NON-USER: CPT | Performed by: THORACIC SURGERY (CARDIOTHORACIC VASCULAR SURGERY)

## 2024-04-16 ASSESSMENT — ENCOUNTER SYMPTOMS
ABDOMINAL DISTENTION: 0
DIAPHORESIS: 0
APPETITE CHANGE: 0
CHILLS: 0
UNEXPECTED WEIGHT CHANGE: 0
OCCASIONAL FEELINGS OF UNSTEADINESS: 0
ABDOMINAL PAIN: 0
DEPRESSION: 0
FEVER: 0
NAUSEA: 0
VOMITING: 0
EYES NEGATIVE: 1
COUGH: 0
HEMATOLOGIC/LYMPHATIC NEGATIVE: 1
FATIGUE: 0
LOSS OF SENSATION IN FEET: 0
PSYCHIATRIC NEGATIVE: 1
NEUROLOGICAL NEGATIVE: 1
WHEEZING: 0
ENDOCRINE NEGATIVE: 1
SHORTNESS OF BREATH: 0
CHOKING: 0
ALLERGIC/IMMUNOLOGIC NEGATIVE: 1
CHEST TIGHTNESS: 0
STRIDOR: 0
CONSTIPATION: 0
DIARRHEA: 0
MUSCULOSKELETAL NEGATIVE: 1
PALPITATIONS: 0

## 2024-04-16 ASSESSMENT — PAIN SCALES - GENERAL: PAINLEVEL: 0-NO PAIN

## 2024-04-16 ASSESSMENT — PATIENT HEALTH QUESTIONNAIRE - PHQ9
1. LITTLE INTEREST OR PLEASURE IN DOING THINGS: NOT AT ALL
2. FEELING DOWN, DEPRESSED OR HOPELESS: NOT AT ALL
SUM OF ALL RESPONSES TO PHQ9 QUESTIONS 1 AND 2: 0

## 2024-04-16 ASSESSMENT — LIFESTYLE VARIABLES
HOW OFTEN DO YOU HAVE A DRINK CONTAINING ALCOHOL: NEVER
AUDIT-C TOTAL SCORE: 0
HOW OFTEN DO YOU HAVE SIX OR MORE DRINKS ON ONE OCCASION: NEVER
HOW MANY STANDARD DRINKS CONTAINING ALCOHOL DO YOU HAVE ON A TYPICAL DAY: PATIENT DOES NOT DRINK
SKIP TO QUESTIONS 9-10: 1

## 2024-04-16 NOTE — PROGRESS NOTES
Subjective   Patient ID: Fatmata Plummer is a 68 y.o. female who presents for Follow-up (6mo w ct).  HPI  68-year-old female with severe COPD oxygen dependent with PFT showing FEV1 of 41% and a DLCO of 31% who was found to have a right lower lobe lung nodule.  She underwent a robotic right lower lobe wedge resection and mediastinal lymphadenectomy.  She recovered very well from it.  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  From her mediastinal lymphadenectomy station 7 lymph nodes were positive for malignancy.  This is a PT1BN2.  Her x-ray today shows good lung expansion.  I discussion with her about next steps and I will refer her to medical oncology for discussion about adjuvant therapy.  I will see her again in 6 months with a CT of the chest.    Update 4/16/2024  She has been doing well continues to use oxygen.  CT scan showing no evidence of recurrence.  Will continue surveillance of her lung cancer.  She has a CT scan already scheduled in 6 months and when to see her with results.    Review of Systems   Constitutional:  Negative for appetite change, chills, diaphoresis, fatigue, fever and unexpected weight change.   HENT: Negative.     Eyes: Negative.    Respiratory:  Negative for cough, choking, chest tightness, shortness of breath, wheezing and stridor.    Cardiovascular:  Negative for chest pain, palpitations and leg swelling.   Gastrointestinal:  Negative for abdominal distention, abdominal pain, constipation, diarrhea, nausea and vomiting.   Endocrine: Negative.    Genitourinary: Negative.    Musculoskeletal: Negative.    Skin: Negative.    Allergic/Immunologic: Negative.    Neurological: Negative.    Hematological: Negative.    Psychiatric/Behavioral: Negative.     All other systems reviewed and are negative.      Objective   Physical Exam  Constitutional:       Appearance: Normal appearance.   HENT:      Head: Normocephalic and atraumatic.      Nose: Nose  normal.      Mouth/Throat:      Mouth: Mucous membranes are moist.      Pharynx: Oropharynx is clear.   Eyes:      Extraocular Movements: Extraocular movements intact.      Conjunctiva/sclera: Conjunctivae normal.      Pupils: Pupils are equal, round, and reactive to light.   Cardiovascular:      Rate and Rhythm: Normal rate and regular rhythm.      Pulses: Normal pulses.      Heart sounds: Normal heart sounds.   Pulmonary:      Effort: Pulmonary effort is normal. No respiratory distress.      Breath sounds: Normal breath sounds. No stridor. No wheezing, rhonchi or rales.   Chest:      Chest wall: No tenderness.   Abdominal:      General: Abdomen is flat. Bowel sounds are normal.      Palpations: Abdomen is soft.   Musculoskeletal:         General: Normal range of motion.      Cervical back: Normal range of motion and neck supple.   Skin:     General: Skin is warm and dry.      Capillary Refill: Capillary refill takes less than 2 seconds.   Neurological:      General: No focal deficit present.      Mental Status: She is alert and oriented to person, place, and time.         Assessment/Plan   Diagnoses and all orders for this visit:  Adenocarcinoma of right lung (Multi)    Follow-up with CT chest in 6 months    Franky Cho MD  Thoracic and Esophageal Surgery

## 2024-05-21 ENCOUNTER — APPOINTMENT (OUTPATIENT)
Dept: PRIMARY CARE | Facility: CLINIC | Age: 69
End: 2024-05-21
Payer: MEDICARE

## 2024-06-03 ENCOUNTER — HOSPITAL ENCOUNTER (INPATIENT)
Facility: HOSPITAL | Age: 69
LOS: 3 days | Discharge: HOME | DRG: 193 | End: 2024-06-07
Attending: STUDENT IN AN ORGANIZED HEALTH CARE EDUCATION/TRAINING PROGRAM | Admitting: FAMILY MEDICINE
Payer: MEDICARE

## 2024-06-03 ENCOUNTER — APPOINTMENT (OUTPATIENT)
Dept: RADIOLOGY | Facility: HOSPITAL | Age: 69
DRG: 193 | End: 2024-06-03
Payer: MEDICARE

## 2024-06-03 ENCOUNTER — APPOINTMENT (OUTPATIENT)
Dept: CARDIOLOGY | Facility: HOSPITAL | Age: 69
DRG: 193 | End: 2024-06-03
Payer: MEDICARE

## 2024-06-03 DIAGNOSIS — R41.0 CONFUSION: ICD-10-CM

## 2024-06-03 DIAGNOSIS — J44.1 COPD EXACERBATION (MULTI): ICD-10-CM

## 2024-06-03 DIAGNOSIS — R06.02 SHORTNESS OF BREATH: Primary | ICD-10-CM

## 2024-06-03 DIAGNOSIS — J96.11 CHRONIC RESPIRATORY FAILURE WITH HYPOXIA (MULTI): ICD-10-CM

## 2024-06-03 DIAGNOSIS — J18.9 PNEUMONIA OF RIGHT LUNG DUE TO INFECTIOUS ORGANISM, UNSPECIFIED PART OF LUNG: ICD-10-CM

## 2024-06-03 DIAGNOSIS — J15.9 COMMUNITY ACQUIRED BACTERIAL PNEUMONIA: ICD-10-CM

## 2024-06-03 LAB
ALBUMIN SERPL BCP-MCNC: 3.4 G/DL (ref 3.4–5)
ALP SERPL-CCNC: 92 U/L (ref 33–136)
ALT SERPL W P-5'-P-CCNC: 9 U/L (ref 7–45)
AMORPH CRY #/AREA UR COMP ASSIST: NORMAL /HPF
AMPHETAMINES UR QL SCN: ABNORMAL
ANION GAP BLDV CALCULATED.4IONS-SCNC: 8 MMOL/L (ref 10–25)
ANION GAP SERPL CALC-SCNC: 13 MMOL/L (ref 10–20)
APPEARANCE UR: CLEAR
AST SERPL W P-5'-P-CCNC: 13 U/L (ref 9–39)
BARBITURATES UR QL SCN: ABNORMAL
BASE EXCESS BLDV CALC-SCNC: 4.6 MMOL/L (ref -2–3)
BASOPHILS # BLD AUTO: 0.04 X10*3/UL (ref 0–0.1)
BASOPHILS NFR BLD AUTO: 0.3 %
BENZODIAZ UR QL SCN: ABNORMAL
BILIRUB SERPL-MCNC: 0.6 MG/DL (ref 0–1.2)
BILIRUB UR STRIP.AUTO-MCNC: NEGATIVE MG/DL
BNP SERPL-MCNC: 89 PG/ML (ref 0–99)
BODY TEMPERATURE: ABNORMAL
BUN SERPL-MCNC: 21 MG/DL (ref 6–23)
BZE UR QL SCN: ABNORMAL
CA-I BLDV-SCNC: 1.08 MMOL/L (ref 1.1–1.33)
CALCIUM SERPL-MCNC: 8.5 MG/DL (ref 8.6–10.3)
CANNABINOIDS UR QL SCN: ABNORMAL
CARDIAC TROPONIN I PNL SERPL HS: 4 NG/L (ref 0–13)
CARDIAC TROPONIN I PNL SERPL HS: 4 NG/L (ref 0–13)
CHLORIDE BLDV-SCNC: 97 MMOL/L (ref 98–107)
CHLORIDE SERPL-SCNC: 94 MMOL/L (ref 98–107)
CO2 SERPL-SCNC: 29 MMOL/L (ref 21–32)
COLOR UR: ABNORMAL
CREAT SERPL-MCNC: 1.06 MG/DL (ref 0.5–1.05)
EGFRCR SERPLBLD CKD-EPI 2021: 58 ML/MIN/1.73M*2
EOSINOPHIL # BLD AUTO: 0.09 X10*3/UL (ref 0–0.7)
EOSINOPHIL NFR BLD AUTO: 0.7 %
ERYTHROCYTE [DISTWIDTH] IN BLOOD BY AUTOMATED COUNT: 13 % (ref 11.5–14.5)
FENTANYL+NORFENTANYL UR QL SCN: ABNORMAL
FLUAV RNA RESP QL NAA+PROBE: NOT DETECTED
FLUBV RNA RESP QL NAA+PROBE: NOT DETECTED
GLUCOSE BLDV-MCNC: 67 MG/DL (ref 74–99)
GLUCOSE SERPL-MCNC: 104 MG/DL (ref 74–99)
GLUCOSE UR STRIP.AUTO-MCNC: NORMAL MG/DL
HCO3 BLDV-SCNC: 29.8 MMOL/L (ref 22–26)
HCT VFR BLD AUTO: 32.8 % (ref 36–46)
HCT VFR BLD EST: 32 % (ref 36–46)
HGB BLD-MCNC: 10.3 G/DL (ref 12–16)
HGB BLDV-MCNC: 10.8 G/DL (ref 12–16)
IMM GRANULOCYTES # BLD AUTO: 0.1 X10*3/UL (ref 0–0.7)
IMM GRANULOCYTES NFR BLD AUTO: 0.8 % (ref 0–0.9)
INHALED O2 CONCENTRATION: 36 %
KETONES UR STRIP.AUTO-MCNC: NEGATIVE MG/DL
LACTATE BLDV-SCNC: 0.8 MMOL/L (ref 0.4–2)
LACTATE SERPL-SCNC: 0.7 MMOL/L (ref 0.4–2)
LEUKOCYTE ESTERASE UR QL STRIP.AUTO: ABNORMAL
LYMPHOCYTES # BLD AUTO: 1.64 X10*3/UL (ref 1.2–4.8)
LYMPHOCYTES NFR BLD AUTO: 13.1 %
MAGNESIUM SERPL-MCNC: 3.12 MG/DL (ref 1.6–2.4)
MCH RBC QN AUTO: 27.9 PG (ref 26–34)
MCHC RBC AUTO-ENTMCNC: 31.4 G/DL (ref 32–36)
MCV RBC AUTO: 89 FL (ref 80–100)
METHADONE UR QL SCN: ABNORMAL
MONOCYTES # BLD AUTO: 0.79 X10*3/UL (ref 0.1–1)
MONOCYTES NFR BLD AUTO: 6.3 %
NEUTROPHILS # BLD AUTO: 9.85 X10*3/UL (ref 1.2–7.7)
NEUTROPHILS NFR BLD AUTO: 78.8 %
NITRITE UR QL STRIP.AUTO: NEGATIVE
NRBC BLD-RTO: 0 /100 WBCS (ref 0–0)
OPIATES UR QL SCN: ABNORMAL
OXYCODONE+OXYMORPHONE UR QL SCN: ABNORMAL
OXYHGB MFR BLDV: 91 % (ref 45–75)
PCO2 BLDV: 46 MM HG (ref 41–51)
PCP UR QL SCN: ABNORMAL
PH BLDV: 7.42 PH (ref 7.33–7.43)
PH UR STRIP.AUTO: 6.5 [PH]
PLATELET # BLD AUTO: 221 X10*3/UL (ref 150–450)
PO2 BLDV: 64 MM HG (ref 35–45)
POTASSIUM BLDV-SCNC: 3.7 MMOL/L (ref 3.5–5.3)
POTASSIUM SERPL-SCNC: 3.6 MMOL/L (ref 3.5–5.3)
PROT SERPL-MCNC: 7.1 G/DL (ref 6.4–8.2)
PROT UR STRIP.AUTO-MCNC: ABNORMAL MG/DL
RBC # BLD AUTO: 3.69 X10*6/UL (ref 4–5.2)
RBC # UR STRIP.AUTO: NEGATIVE /UL
RBC #/AREA URNS AUTO: NORMAL /HPF
RSV RNA RESP QL NAA+PROBE: NOT DETECTED
SAO2 % BLDV: 94 % (ref 45–75)
SARS-COV-2 RNA RESP QL NAA+PROBE: NOT DETECTED
SODIUM BLDV-SCNC: 131 MMOL/L (ref 136–145)
SODIUM SERPL-SCNC: 132 MMOL/L (ref 136–145)
SP GR UR STRIP.AUTO: 1.02
SQUAMOUS #/AREA URNS AUTO: NORMAL /HPF
TSH SERPL-ACNC: 0.56 MIU/L (ref 0.44–3.98)
UROBILINOGEN UR STRIP.AUTO-MCNC: NORMAL MG/DL
WBC # BLD AUTO: 12.5 X10*3/UL (ref 4.4–11.3)
WBC #/AREA URNS AUTO: NORMAL /HPF

## 2024-06-03 PROCEDURE — 84484 ASSAY OF TROPONIN QUANT: CPT | Mod: 91 | Performed by: STUDENT IN AN ORGANIZED HEALTH CARE EDUCATION/TRAINING PROGRAM

## 2024-06-03 PROCEDURE — 83605 ASSAY OF LACTIC ACID: CPT | Performed by: STUDENT IN AN ORGANIZED HEALTH CARE EDUCATION/TRAINING PROGRAM

## 2024-06-03 PROCEDURE — 70450 CT HEAD/BRAIN W/O DYE: CPT | Performed by: RADIOLOGY

## 2024-06-03 PROCEDURE — 2550000001 HC RX 255 CONTRASTS: Performed by: STUDENT IN AN ORGANIZED HEALTH CARE EDUCATION/TRAINING PROGRAM

## 2024-06-03 PROCEDURE — 84443 ASSAY THYROID STIM HORMONE: CPT | Performed by: PHYSICIAN ASSISTANT

## 2024-06-03 PROCEDURE — 96368 THER/DIAG CONCURRENT INF: CPT

## 2024-06-03 PROCEDURE — 2500000002 HC RX 250 W HCPCS SELF ADMINISTERED DRUGS (ALT 637 FOR MEDICARE OP, ALT 636 FOR OP/ED): Mod: MUE | Performed by: STUDENT IN AN ORGANIZED HEALTH CARE EDUCATION/TRAINING PROGRAM

## 2024-06-03 PROCEDURE — 2500000004 HC RX 250 GENERAL PHARMACY W/ HCPCS (ALT 636 FOR OP/ED): Performed by: PHYSICIAN ASSISTANT

## 2024-06-03 PROCEDURE — 70450 CT HEAD/BRAIN W/O DYE: CPT

## 2024-06-03 PROCEDURE — G0378 HOSPITAL OBSERVATION PER HR: HCPCS

## 2024-06-03 PROCEDURE — 99223 1ST HOSP IP/OBS HIGH 75: CPT | Performed by: STUDENT IN AN ORGANIZED HEALTH CARE EDUCATION/TRAINING PROGRAM

## 2024-06-03 PROCEDURE — 85025 COMPLETE CBC W/AUTO DIFF WBC: CPT | Performed by: STUDENT IN AN ORGANIZED HEALTH CARE EDUCATION/TRAINING PROGRAM

## 2024-06-03 PROCEDURE — 83605 ASSAY OF LACTIC ACID: CPT | Mod: 91 | Performed by: STUDENT IN AN ORGANIZED HEALTH CARE EDUCATION/TRAINING PROGRAM

## 2024-06-03 PROCEDURE — 2500000001 HC RX 250 WO HCPCS SELF ADMINISTERED DRUGS (ALT 637 FOR MEDICARE OP): Performed by: PHYSICIAN ASSISTANT

## 2024-06-03 PROCEDURE — 96375 TX/PRO/DX INJ NEW DRUG ADDON: CPT

## 2024-06-03 PROCEDURE — 83880 ASSAY OF NATRIURETIC PEPTIDE: CPT | Performed by: STUDENT IN AN ORGANIZED HEALTH CARE EDUCATION/TRAINING PROGRAM

## 2024-06-03 PROCEDURE — 84295 ASSAY OF SERUM SODIUM: CPT | Mod: 91 | Performed by: STUDENT IN AN ORGANIZED HEALTH CARE EDUCATION/TRAINING PROGRAM

## 2024-06-03 PROCEDURE — 2500000002 HC RX 250 W HCPCS SELF ADMINISTERED DRUGS (ALT 637 FOR MEDICARE OP, ALT 636 FOR OP/ED): Performed by: STUDENT IN AN ORGANIZED HEALTH CARE EDUCATION/TRAINING PROGRAM

## 2024-06-03 PROCEDURE — 93005 ELECTROCARDIOGRAM TRACING: CPT

## 2024-06-03 PROCEDURE — 74177 CT ABD & PELVIS W/CONTRAST: CPT | Performed by: RADIOLOGY

## 2024-06-03 PROCEDURE — 84132 ASSAY OF SERUM POTASSIUM: CPT | Mod: 91 | Performed by: STUDENT IN AN ORGANIZED HEALTH CARE EDUCATION/TRAINING PROGRAM

## 2024-06-03 PROCEDURE — 94640 AIRWAY INHALATION TREATMENT: CPT

## 2024-06-03 PROCEDURE — 71275 CT ANGIOGRAPHY CHEST: CPT | Performed by: RADIOLOGY

## 2024-06-03 PROCEDURE — 84484 ASSAY OF TROPONIN QUANT: CPT | Performed by: STUDENT IN AN ORGANIZED HEALTH CARE EDUCATION/TRAINING PROGRAM

## 2024-06-03 PROCEDURE — 83735 ASSAY OF MAGNESIUM: CPT | Performed by: STUDENT IN AN ORGANIZED HEALTH CARE EDUCATION/TRAINING PROGRAM

## 2024-06-03 PROCEDURE — 71275 CT ANGIOGRAPHY CHEST: CPT

## 2024-06-03 PROCEDURE — 87086 URINE CULTURE/COLONY COUNT: CPT | Mod: GEALAB | Performed by: STUDENT IN AN ORGANIZED HEALTH CARE EDUCATION/TRAINING PROGRAM

## 2024-06-03 PROCEDURE — 82947 ASSAY GLUCOSE BLOOD QUANT: CPT | Mod: 91 | Performed by: STUDENT IN AN ORGANIZED HEALTH CARE EDUCATION/TRAINING PROGRAM

## 2024-06-03 PROCEDURE — 80307 DRUG TEST PRSMV CHEM ANLYZR: CPT | Performed by: PHYSICIAN ASSISTANT

## 2024-06-03 PROCEDURE — 81001 URINALYSIS AUTO W/SCOPE: CPT | Performed by: STUDENT IN AN ORGANIZED HEALTH CARE EDUCATION/TRAINING PROGRAM

## 2024-06-03 PROCEDURE — 36415 COLL VENOUS BLD VENIPUNCTURE: CPT | Performed by: STUDENT IN AN ORGANIZED HEALTH CARE EDUCATION/TRAINING PROGRAM

## 2024-06-03 PROCEDURE — 87040 BLOOD CULTURE FOR BACTERIA: CPT | Mod: 91,GEALAB | Performed by: STUDENT IN AN ORGANIZED HEALTH CARE EDUCATION/TRAINING PROGRAM

## 2024-06-03 PROCEDURE — 94760 N-INVAS EAR/PLS OXIMETRY 1: CPT

## 2024-06-03 PROCEDURE — 71045 X-RAY EXAM CHEST 1 VIEW: CPT | Performed by: RADIOLOGY

## 2024-06-03 PROCEDURE — 96367 TX/PROPH/DG ADDL SEQ IV INF: CPT

## 2024-06-03 PROCEDURE — 87637 SARSCOV2&INF A&B&RSV AMP PRB: CPT | Performed by: STUDENT IN AN ORGANIZED HEALTH CARE EDUCATION/TRAINING PROGRAM

## 2024-06-03 PROCEDURE — 94664 DEMO&/EVAL PT USE INHALER: CPT

## 2024-06-03 PROCEDURE — 99285 EMERGENCY DEPT VISIT HI MDM: CPT | Mod: 25

## 2024-06-03 PROCEDURE — 71045 X-RAY EXAM CHEST 1 VIEW: CPT

## 2024-06-03 PROCEDURE — 74177 CT ABD & PELVIS W/CONTRAST: CPT

## 2024-06-03 PROCEDURE — 96365 THER/PROPH/DIAG IV INF INIT: CPT

## 2024-06-03 PROCEDURE — 2500000004 HC RX 250 GENERAL PHARMACY W/ HCPCS (ALT 636 FOR OP/ED): Performed by: STUDENT IN AN ORGANIZED HEALTH CARE EDUCATION/TRAINING PROGRAM

## 2024-06-03 RX ORDER — HYDROXYZINE HYDROCHLORIDE 25 MG/1
25 TABLET, FILM COATED ORAL EVERY 12 HOURS PRN
Status: CANCELLED | OUTPATIENT
Start: 2024-06-03

## 2024-06-03 RX ORDER — ROPINIROLE 1 MG/1
1 TABLET, FILM COATED ORAL NIGHTLY
Status: DISCONTINUED | OUTPATIENT
Start: 2024-06-03 | End: 2024-06-07 | Stop reason: HOSPADM

## 2024-06-03 RX ORDER — ALBUTEROL SULFATE 0.83 MG/ML
2.5 SOLUTION RESPIRATORY (INHALATION) EVERY 4 HOURS PRN
Status: DISCONTINUED | OUTPATIENT
Start: 2024-06-03 | End: 2024-06-03

## 2024-06-03 RX ORDER — IPRATROPIUM BROMIDE AND ALBUTEROL SULFATE 2.5; .5 MG/3ML; MG/3ML
3 SOLUTION RESPIRATORY (INHALATION)
Status: DISCONTINUED | OUTPATIENT
Start: 2024-06-03 | End: 2024-06-03

## 2024-06-03 RX ORDER — POLYETHYLENE GLYCOL 3350 17 G/17G
17 POWDER, FOR SOLUTION ORAL DAILY
Status: DISCONTINUED | OUTPATIENT
Start: 2024-06-03 | End: 2024-06-07 | Stop reason: HOSPADM

## 2024-06-03 RX ORDER — ONDANSETRON HYDROCHLORIDE 2 MG/ML
4 INJECTION, SOLUTION INTRAVENOUS EVERY 6 HOURS PRN
Status: DISCONTINUED | OUTPATIENT
Start: 2024-06-03 | End: 2024-06-07 | Stop reason: HOSPADM

## 2024-06-03 RX ORDER — GABAPENTIN 300 MG/1
600 CAPSULE ORAL 2 TIMES DAILY
Status: DISCONTINUED | OUTPATIENT
Start: 2024-06-03 | End: 2024-06-05

## 2024-06-03 RX ORDER — FOLIC ACID 1 MG/1
1 TABLET ORAL EVERY MORNING
Status: DISCONTINUED | OUTPATIENT
Start: 2024-06-04 | End: 2024-06-07 | Stop reason: HOSPADM

## 2024-06-03 RX ORDER — HYDROXYZINE HYDROCHLORIDE 25 MG/1
25 TABLET, FILM COATED ORAL 2 TIMES DAILY
COMMUNITY

## 2024-06-03 RX ORDER — LEVOTHYROXINE SODIUM 25 UG/1
25 TABLET ORAL
Status: DISCONTINUED | OUTPATIENT
Start: 2024-06-04 | End: 2024-06-07 | Stop reason: HOSPADM

## 2024-06-03 RX ORDER — IPRATROPIUM BROMIDE AND ALBUTEROL SULFATE 2.5; .5 MG/3ML; MG/3ML
3 SOLUTION RESPIRATORY (INHALATION) ONCE
Status: COMPLETED | OUTPATIENT
Start: 2024-06-03 | End: 2024-06-03

## 2024-06-03 RX ORDER — FLUTICASONE FUROATE AND VILANTEROL 200; 25 UG/1; UG/1
1 POWDER RESPIRATORY (INHALATION)
Status: DISCONTINUED | OUTPATIENT
Start: 2024-06-03 | End: 2024-06-07 | Stop reason: HOSPADM

## 2024-06-03 RX ORDER — DOCUSATE SODIUM 100 MG/1
100 CAPSULE, LIQUID FILLED ORAL 2 TIMES DAILY
Status: DISCONTINUED | OUTPATIENT
Start: 2024-06-03 | End: 2024-06-07 | Stop reason: HOSPADM

## 2024-06-03 RX ORDER — IPRATROPIUM BROMIDE AND ALBUTEROL SULFATE 2.5; .5 MG/3ML; MG/3ML
3 SOLUTION RESPIRATORY (INHALATION)
Status: DISCONTINUED | OUTPATIENT
Start: 2024-06-03 | End: 2024-06-07 | Stop reason: HOSPADM

## 2024-06-03 RX ORDER — MAGNESIUM SULFATE HEPTAHYDRATE 40 MG/ML
2 INJECTION, SOLUTION INTRAVENOUS ONCE
Status: COMPLETED | OUTPATIENT
Start: 2024-06-03 | End: 2024-06-03

## 2024-06-03 RX ORDER — CEFTRIAXONE 2 G/50ML
2 INJECTION, SOLUTION INTRAVENOUS EVERY 24 HOURS
Status: DISCONTINUED | OUTPATIENT
Start: 2024-06-03 | End: 2024-06-06 | Stop reason: CLARIF

## 2024-06-03 RX ORDER — LORAZEPAM 1 MG/1
1 TABLET ORAL EVERY 8 HOURS PRN
Status: DISCONTINUED | OUTPATIENT
Start: 2024-06-03 | End: 2024-06-04

## 2024-06-03 RX ORDER — ACETAMINOPHEN, DIPHENHYDRAMINE HCL, PHENYLEPHRINE HCL 325; 25; 5 MG/1; MG/1; MG/1
2 TABLET ORAL NIGHTLY
COMMUNITY

## 2024-06-03 RX ORDER — ATORVASTATIN CALCIUM 20 MG/1
20 TABLET, FILM COATED ORAL NIGHTLY
Status: DISCONTINUED | OUTPATIENT
Start: 2024-06-03 | End: 2024-06-07 | Stop reason: HOSPADM

## 2024-06-03 RX ORDER — ACETAMINOPHEN 325 MG/1
650 TABLET ORAL EVERY 6 HOURS PRN
Status: DISCONTINUED | OUTPATIENT
Start: 2024-06-03 | End: 2024-06-07 | Stop reason: HOSPADM

## 2024-06-03 RX ORDER — ALBUTEROL SULFATE 0.83 MG/ML
2.5 SOLUTION RESPIRATORY (INHALATION) EVERY 2 HOUR PRN
Status: DISCONTINUED | OUTPATIENT
Start: 2024-06-03 | End: 2024-06-07 | Stop reason: HOSPADM

## 2024-06-03 RX ORDER — LORAZEPAM 1 MG/1
1 TABLET ORAL NIGHTLY
COMMUNITY

## 2024-06-03 RX ORDER — DULOXETIN HYDROCHLORIDE 30 MG/1
60 CAPSULE, DELAYED RELEASE ORAL EVERY 24 HOURS
Status: DISCONTINUED | OUTPATIENT
Start: 2024-06-04 | End: 2024-06-07 | Stop reason: HOSPADM

## 2024-06-03 RX ORDER — TIZANIDINE 4 MG/1
4 TABLET ORAL NIGHTLY PRN
Status: DISCONTINUED | OUTPATIENT
Start: 2024-06-03 | End: 2024-06-05

## 2024-06-03 RX ADMIN — CEFTRIAXONE SODIUM 2 G: 2 INJECTION, SOLUTION INTRAVENOUS at 15:15

## 2024-06-03 RX ADMIN — LORAZEPAM 1 MG: 1 TABLET ORAL at 22:00

## 2024-06-03 RX ADMIN — AZITHROMYCIN MONOHYDRATE 500 MG: 500 INJECTION, POWDER, LYOPHILIZED, FOR SOLUTION INTRAVENOUS at 15:15

## 2024-06-03 RX ADMIN — ATORVASTATIN CALCIUM 20 MG: 20 TABLET, FILM COATED ORAL at 21:49

## 2024-06-03 RX ADMIN — ONDANSETRON 4 MG: 2 INJECTION INTRAMUSCULAR; INTRAVENOUS at 21:52

## 2024-06-03 RX ADMIN — IPRATROPIUM BROMIDE AND ALBUTEROL SULFATE 3 ML: .5; 3 SOLUTION RESPIRATORY (INHALATION) at 11:32

## 2024-06-03 RX ADMIN — ROPINIROLE HYDROCHLORIDE 1 MG: 1 TABLET, FILM COATED ORAL at 21:50

## 2024-06-03 RX ADMIN — GABAPENTIN 600 MG: 300 CAPSULE ORAL at 21:49

## 2024-06-03 RX ADMIN — IOHEXOL 75 ML: 350 INJECTION, SOLUTION INTRAVENOUS at 14:13

## 2024-06-03 RX ADMIN — POLYETHYLENE GLYCOL 3350 17 G: 17 POWDER, FOR SOLUTION ORAL at 17:31

## 2024-06-03 RX ADMIN — METHYLPREDNISOLONE SODIUM SUCCINATE 125 MG: 125 INJECTION, POWDER, FOR SOLUTION INTRAMUSCULAR; INTRAVENOUS at 11:32

## 2024-06-03 RX ADMIN — MAGNESIUM SULFATE HEPTAHYDRATE 2 G: 2 INJECTION, SOLUTION INTRAVENOUS at 11:32

## 2024-06-03 RX ADMIN — DOCUSATE SODIUM 100 MG: 100 CAPSULE, LIQUID FILLED ORAL at 21:50

## 2024-06-03 RX ADMIN — IPRATROPIUM BROMIDE AND ALBUTEROL SULFATE 3 ML: 2.5; .5 SOLUTION RESPIRATORY (INHALATION) at 20:30

## 2024-06-03 SDOH — SOCIAL STABILITY: SOCIAL INSECURITY: HAVE YOU HAD THOUGHTS OF HARMING ANYONE ELSE?: NO

## 2024-06-03 ASSESSMENT — ENCOUNTER SYMPTOMS
SEIZURES: 0
PHOTOPHOBIA: 0
HALLUCINATIONS: 0
CONFUSION: 1
HEADACHES: 0
ABDOMINAL PAIN: 0
HEMATURIA: 0
DYSURIA: 0
PALPITATIONS: 0
WHEEZING: 0
FEVER: 0
NAUSEA: 0
SHORTNESS OF BREATH: 1
DIARRHEA: 0
EYE REDNESS: 0
TROUBLE SWALLOWING: 0
FREQUENCY: 0
LIGHT-HEADEDNESS: 0
FLANK PAIN: 0
CHEST TIGHTNESS: 0
MYALGIAS: 0
SORE THROAT: 0
WOUND: 0
RHINORRHEA: 0
DIZZINESS: 0
DECREASED CONCENTRATION: 0
SLEEP DISTURBANCE: 0
WEAKNESS: 1
DIAPHORESIS: 0
CONSTIPATION: 0
FACIAL ASYMMETRY: 0
DIFFICULTY URINATING: 0
VOMITING: 0
NUMBNESS: 0
COUGH: 1
UNEXPECTED WEIGHT CHANGE: 0

## 2024-06-03 ASSESSMENT — PATIENT HEALTH QUESTIONNAIRE - PHQ9
2. FEELING DOWN, DEPRESSED OR HOPELESS: NOT AT ALL
SUM OF ALL RESPONSES TO PHQ9 QUESTIONS 1 & 2: 0
1. LITTLE INTEREST OR PLEASURE IN DOING THINGS: NOT AT ALL

## 2024-06-03 ASSESSMENT — LIFESTYLE VARIABLES
SKIP TO QUESTIONS 9-10: 1
SUBSTANCE_ABUSE_PAST_12_MONTHS: NO
HOW OFTEN DO YOU HAVE 6 OR MORE DRINKS ON ONE OCCASION: NEVER
HAVE YOU EVER FELT YOU SHOULD CUT DOWN ON YOUR DRINKING: NO
HOW MANY STANDARD DRINKS CONTAINING ALCOHOL DO YOU HAVE ON A TYPICAL DAY: PATIENT DOES NOT DRINK
AUDIT-C TOTAL SCORE: 0
AUDIT-C TOTAL SCORE: 0
HOW OFTEN DO YOU HAVE A DRINK CONTAINING ALCOHOL: NEVER
TOTAL SCORE: 0
EVER HAD A DRINK FIRST THING IN THE MORNING TO STEADY YOUR NERVES TO GET RID OF A HANGOVER: NO
EVER FELT BAD OR GUILTY ABOUT YOUR DRINKING: NO
HAVE PEOPLE ANNOYED YOU BY CRITICIZING YOUR DRINKING: NO
PRESCIPTION_ABUSE_PAST_12_MONTHS: NO

## 2024-06-03 ASSESSMENT — COGNITIVE AND FUNCTIONAL STATUS - GENERAL
WALKING IN HOSPITAL ROOM: A LITTLE
MOBILITY SCORE: 22
CLIMB 3 TO 5 STEPS WITH RAILING: A LITTLE
WALKING IN HOSPITAL ROOM: A LITTLE
MOBILITY SCORE: 22
PATIENT BASELINE BEDBOUND: NO
CLIMB 3 TO 5 STEPS WITH RAILING: A LITTLE
DAILY ACTIVITIY SCORE: 24
DAILY ACTIVITIY SCORE: 24

## 2024-06-03 ASSESSMENT — CURB65 SCORE
HAS CONFUSION: YES
RESPIRATORY RATE GREATER THAN OR EQUAL TO 30: NO
SBP LESS THAN 90 OR DBNP LESS THAN 60: NO
BUN IS GREATER THAN 19 MG/DL: YES
CURB65 SCORE: 3
AGE IS GREATER THAN OR EQUAL TO 65: YES

## 2024-06-03 ASSESSMENT — ACTIVITIES OF DAILY LIVING (ADL)
FEEDING YOURSELF: INDEPENDENT
JUDGMENT_ADEQUATE_SAFELY_COMPLETE_DAILY_ACTIVITIES: YES
HEARING - RIGHT EAR: FUNCTIONAL
LACK_OF_TRANSPORTATION: NO
PATIENT'S MEMORY ADEQUATE TO SAFELY COMPLETE DAILY ACTIVITIES?: YES
WALKS IN HOME: NEEDS ASSISTANCE
BATHING: INDEPENDENT
GROOMING: INDEPENDENT
ADEQUATE_TO_COMPLETE_ADL: YES
TOILETING: INDEPENDENT
DRESSING YOURSELF: INDEPENDENT
HEARING - LEFT EAR: FUNCTIONAL

## 2024-06-03 ASSESSMENT — PAIN - FUNCTIONAL ASSESSMENT
PAIN_FUNCTIONAL_ASSESSMENT: 0-10
PAIN_FUNCTIONAL_ASSESSMENT: 0-10

## 2024-06-03 ASSESSMENT — PAIN SCALES - GENERAL
PAINLEVEL_OUTOF10: 0 - NO PAIN
PAINLEVEL_OUTOF10: 0 - NO PAIN

## 2024-06-03 NOTE — ED PROVIDER NOTES
"HPI   Chief Complaint   Patient presents with    Shortness of Breath     Wears 4L baseline per pt her SPO2 dropped in the car. 99% on baseline 4L now. Endorses weakness, confusion x days, multiple falls. No thinners. Endorses a clear productive cough.  Family member states she's been confused about \"everything\"       HPI     Patient is a 68-year-old female with a past medical history of COPD, anxiety on lorazepam, hydroxyzine presenting to the emergency department today in the setting of endorsed worsening confusion over the last few days.  Reportedly has happened since Saturday.  No provoking events.  Since then she is also had recurrent falls.   at bedside states she does seem more confused than normal.  She is alert and oriented x 3 for the  at home as well as here.  States that this seems more like she is little slower to respond than normal.  She also does endorse some abdominal discomfort in the suprapubic and right side of the abdomen.  Endorses some increased shortness of breath in the history in the setting of a history of chronic shortness of breath with COPD for which she uses 4 L at baseline.  No lower extremity edema or swelling.  Cough has been mildly productive, clear phlegm in color.  No fevers reported.  Taking all her medications as prescribed no recent changes to medication.  On review of patient's chart she does have a known history of stage III lung cancer which was treated with right lower lobe surgical resection with Dr. Urrutias in 2023.  Based on review of chart records this is typically treated with adjuvant chemo and edema immunotherapy however patient's comorbidities put her at significant risk for complications and therefore they are doing surveillance monitoring for this.  She states her checkups for this have been without issue.  Her last CT chest 4 evaluation was March 4.  Patient CT at that time demonstrated no nodules are lung findings otherwise.             CURB-65 " Score: 3   Prudencio Coma Scale Score: 14                     Patient History   Past Medical History:   Diagnosis Date    Age-related nuclear cataract, bilateral 12/07/2015    Cataract, nuclear sclerotic, both eyes    Age-related nuclear cataract, right eye 04/01/2016    Age-related nuclear cataract of right eye    Aphakia, left eye 01/21/2016    Aphakia of left eye    COPD (chronic obstructive pulmonary disease) (Multi)     Cortical age-related cataract, bilateral 12/07/2015    Cortical age-related cataract of both eyes    Hodgkin lymphoma, unspecified, unspecified site (Multi) 12/04/2013    Hodgkin's disease    Hypermetropia, bilateral 04/01/2016    Hyperopia of both eyes with astigmatism and presbyopia    Lung cancer (Multi) 9/13/2023@ ,    Other chest pain 07/16/2021    Atypical chest pain    Other conditions influencing health status 12/07/2015    PSC (posterior subcapsular cataract), bilateral    Other postherpetic nervous system involvement 07/16/2021    Post herpetic neuralgia    Personal history of other diseases of the respiratory system     History of chronic obstructive lung disease    Personal history of other infectious and parasitic diseases 07/16/2021    History of herpes zoster    Personal history of pneumonia (recurrent) 08/07/2014    History of pneumonia    Posterior subcapsular polar age-related cataract, right eye 04/01/2016    Posterior subcapsular polar age-related cataract of right eye    Presence of intraocular lens 01/21/2016    Pseudophakia of left eye    Stroke (Multi) 2000     Past Surgical History:   Procedure Laterality Date    CATARACT EXTRACTION  02/24/2016    Cataract Surgery    HYSTERECTOMY  09/18/2014    Hysterectomy    LUNG SURGERY  09/13/2023    STRABISMUS SURGERY  12/07/2015    Strabismus Surgery    TUBAL LIGATION  6/1984     Family History   Adopted: Yes     Social History     Tobacco Use    Smoking status: Former     Current packs/day: 0.00     Average packs/day: 1 pack/day for  39.6 years (39.6 ttl pk-yrs)     Types: Cigarettes     Start date:      Quit date: 2014     Years since quittin.8     Passive exposure: Never    Smokeless tobacco: Never   Vaping Use    Vaping status: Never Used   Substance Use Topics    Alcohol use: Never    Drug use: Never       Physical Exam   ED Triage Vitals [24 1050]   Temperature Heart Rate Respirations BP   36.6 °C (97.9 °F) 100 20 109/53      Pulse Ox Temp Source Heart Rate Source Patient Position   99 % Skin Monitor Lying      BP Location FiO2 (%)     Right arm --       Physical Exam  Vitals and nursing note reviewed.   Constitutional:       General: She is not in acute distress.     Appearance: She is well-developed.   HENT:      Head: Normocephalic and atraumatic.   Eyes:      Conjunctiva/sclera: Conjunctivae normal.   Cardiovascular:      Rate and Rhythm: Normal rate and regular rhythm.      Heart sounds: No murmur heard.  Pulmonary:      Effort: Pulmonary effort is normal. No respiratory distress.      Breath sounds: Examination of the right-middle field reveals wheezing. Examination of the left-middle field reveals wheezing. Examination of the right-lower field reveals wheezing. Examination of the left-lower field reveals wheezing. Wheezing present.   Abdominal:      Palpations: Abdomen is soft.      Tenderness: There is abdominal tenderness in the right upper quadrant and right lower quadrant.   Musculoskeletal:         General: No swelling.      Cervical back: Neck supple.   Skin:     General: Skin is warm and dry.      Capillary Refill: Capillary refill takes less than 2 seconds.   Neurological:      Mental Status: She is alert.   Psychiatric:         Mood and Affect: Mood normal.         ED Course & MDM   Diagnoses as of 24 1742   Shortness of breath   Confusion   Pneumonia of right lung due to infectious organism, unspecified part of lung       Medical Decision Making  Patient is a 68-year-old female presenting as above  hemodynamically on arrival here is stable no acute distress alert and oriented x 3 moving all extremities no focal neurologic deficits.  She does have some reproducible tenderness on palpation of the abdomen the right lower quadrant and right upper quadrant.  Negative Hamm signs no rebound, no guarding.  Her lungs have mild expiratory wheezing throughout.  She is on her baseline 4 L of oxygen.  No lower extremity edema, swelling.  Given her active cancer history with endorsed increase shortness of breath discussed possible imaging including a CT angio chest.  Given her endorsed abdominal tenderness with confusion will get a CT abdomen pelvis for evaluation will plan for further evaluation with urinalysis for possible etiology of patient's confusion.  Will be trialed here with breathing treatments for her endorsed worsening shortness of breath and appreciate expiratory wheezing on clinical evaluation.    Workup notable for R PNA, started on IV abx and cultures sent off. CURB 65 3, discussed with patient and  and will plan for admission.  Procedure  Procedures     Janene Garcia MD  06/03/24 [id number]

## 2024-06-03 NOTE — ED TRIAGE NOTES
"Patient here for shortness of breath Wears 4L baseline per pt her SPO2 dropped in the car. 99% on baseline 4L now. Endorses weakness, confusion x days, multiple falls. No thinners. Endorses a clear productive cough.  Family member states she's been confused about \"everything\"  "

## 2024-06-03 NOTE — H&P
History Of Present Illness  Fatmata Plummer is a 68 y.o. female with medical history of COPD (on 4L/min of oxygen at home) right lower lung adenocarcinoma, found to have positive lymph nodes and recently referred to medical oncology, carotid artery stenosis, CAD, PAD, HLD, hypothyroidism, migraines, CVA/TIA in 2006 without residual deficits, anxiety with night terrors presenting with SOB, cough, weakness, confusion, multiple falls and abdominal pain that began 3 days ago.  She feels like her legs just gave out.  Denies any injuries from falling.  Denies CP.  She has not had BM in 5 days.  She has not had much of an appetite.  Endorses mild nausea, no vomiting.        In the ED, /53  afebrile SpO2 99% on 4 L (baseline).  Labs s/f  Na 132 chloride 94 lactate neg BNP 89 Trop neg x 2.  UA neg for infection.  CTA chest ruled out PE but concerning for right lower lobe pneumonia.  Respiratory viral swabs neg.  CT head neg.  CT abd/pelvis notable for moderate amount of stool.  Blood and urine cultures obtained.  She was started on IV antibiotics for community acquired pneumonia and given IV mag, nebs and solumedrol.  Mag was elevated after given IV mag in ED.  Rec OBS as she is on her baseline oxygen.       Past Medical History  She has a past medical history of Age-related nuclear cataract, bilateral (12/07/2015), Age-related nuclear cataract, right eye (04/01/2016), Aphakia, left eye (01/21/2016), COPD (chronic obstructive pulmonary disease) (Multi), Cortical age-related cataract, bilateral (12/07/2015), Hodgkin lymphoma, unspecified, unspecified site (Multi) (12/04/2013), Hypermetropia, bilateral (04/01/2016), Lung cancer (Multi) (9/13/2023@ ,), Other chest pain (07/16/2021), Other conditions influencing health status (12/07/2015), Other postherpetic nervous system involvement (07/16/2021), Personal history of other diseases of the respiratory system, Personal history of other infectious and parasitic diseases  (07/16/2021), Personal history of pneumonia (recurrent) (08/07/2014), Posterior subcapsular polar age-related cataract, right eye (04/01/2016), Presence of intraocular lens (01/21/2016), and Stroke (Multi) (2000).    Surgical History  She has a past surgical history that includes Hysterectomy (09/18/2014); Cataract extraction (02/24/2016); Strabismus surgery (12/07/2015); Lung surgery (09/13/2023); and Tubal ligation (6/1984).     Social History  She reports that she quit smoking about 9 years ago. Her smoking use included cigarettes. She started smoking about 49 years ago. She has a 39.6 pack-year smoking history. She has never been exposed to tobacco smoke. She has never used smokeless tobacco. She reports that she does not drink alcohol and does not use drugs.    Family History  Family History   Adopted: Yes        Allergies  Bupropion, Buspirone, Cilostazol, Latex, Pseudoephedrine, and Nsaids (non-steroidal anti-inflammatory drug)    Review of Systems   Constitutional:  Negative for diaphoresis, fever and unexpected weight change.   HENT:  Negative for rhinorrhea, sore throat and trouble swallowing.    Eyes:  Negative for photophobia, redness and visual disturbance.   Respiratory:  Positive for cough and shortness of breath. Negative for chest tightness and wheezing.    Cardiovascular:  Negative for chest pain, palpitations and leg swelling.   Gastrointestinal:  Negative for abdominal pain, constipation, diarrhea, nausea and vomiting.   Endocrine: Negative for polyuria.   Genitourinary:  Negative for difficulty urinating, dysuria, flank pain, frequency, hematuria and urgency.   Musculoskeletal:  Negative for gait problem and myalgias.   Skin:  Negative for rash and wound.   Allergic/Immunologic: Negative for immunocompromised state.   Neurological:  Positive for weakness. Negative for dizziness, seizures, syncope, facial asymmetry, light-headedness, numbness and headaches.   Psychiatric/Behavioral:  Positive for  confusion. Negative for decreased concentration, hallucinations and sleep disturbance.         Physical Exam   Physical Exam  Gen: NAD  Eyes:  EOM intact  ENT: MMM  Neck: No JVD  Respiratory: mild wheezing, decreased breath sounds at bilateral bases  Cardiac: RRR, no murmurs rubs or gallops  Abdomen: soft, +BS, TTP over RUQ, RLQ  Extremities: no edema or cyanosis  Neuro: No focal deficits, alert and oriented x 3  Psych:  appropriate mood and behavior    Last Recorded Vitals  /61   Pulse 87   Temp 36.6 °C (97.9 °F) (Skin)   Resp 20   Wt 56.7 kg (125 lb)   SpO2 99%     Assessment/Plan   Principal Problem:    Community acquired bacterial pneumonia  -OBS  -continue IV antibiotics  -nebs for mild wheezing  -check procal, urine antigens  -monitor oxygen demand  -IS    COPD on chronic 4 L oxygen/Lung CA mets to LN  -continue home inhalers  -referred to medical oncology    Confusion with falls  -reported by spouse  -possible related to acute infection  -CT head neg  -MRI brain neg for mets 6 months ago  -check utox  -possible polypharmacy: gabapentin, ativan, tylenol #3 with codeine, tizanidine, requip, hydroxyzine  -reduce dose of gabapentin to 600 BID, hold hydroxyzine, tylenol #3  -monitor mentation  -PT/OT    Constipation  -moderate stool present on CT  -bowel regimen  -stool count    Hypermag  -checked after getting dose of mag in ED  -recheck in AM    Anxiety  -ativan PRN  -cymbalta    RLS  -requip    Hypothyroid  -check TSH  -synthroid    Dispo:  OBS for CAP, monitor oxygen status and mentation, eLOS<48 hours  D/w Dr. Sudhakar Smith PA-C

## 2024-06-03 NOTE — CARE PLAN
Problem: Pain  Goal: My pain/discomfort is manageable  Outcome: Progressing     Problem: Safety  Goal: Patient will be injury free during hospitalization  Outcome: Progressing  Goal: I will remain free of falls  Outcome: Progressing     Problem: Daily Care  Goal: Daily care needs are met  Outcome: Progressing     Problem: Psychosocial Needs  Goal: Demonstrates ability to cope with hospitalization/illness  Outcome: Progressing  Goal: Collaborate with me, my family, and caregiver to identify my specific goals  Outcome: Progressing     Problem: Discharge Barriers  Goal: My discharge needs are met  Outcome: Progressing        The clinical goals for the shift include Pt to sit up in chair for 1 hour this shift progressing  Pt will not be sob for shift progressing   Quality 431: Preventive Care And Screening: Unhealthy Alcohol Use - Screening: Patient screened for unhealthy alcohol use using a single question and scores less than 2 times per year Detail Level: Detailed Quality 110: Preventive Care And Screening: Influenza Immunization: Influenza Immunization Administered during Influenza season Quality 130: Documentation Of Current Medications In The Medical Record: Current Medications Documented Quality 128: Preventive Care And Screening: Body Mass Index (Bmi) Screening And Follow-Up Plan: BMI is documented within normal parameters and no follow-up plan is required. Quality 226: Preventive Care And Screening: Tobacco Use: Screening And Cessation Intervention: Patient screened for tobacco use and is an ex/non-smoker

## 2024-06-03 NOTE — PROGRESS NOTES
Pharmacy Medication History Review    Fatmata Plummer is a 68 y.o. female admitted for Community acquired bacterial pneumonia. Pharmacy reviewed the patient's [id number] medications and allergies for accuracy.    The list below reflectives the updated PTA list. Please review each medication in order reconciliation for additional clarification and justification.  Prior to Admission Medications   Prescriptions Last Dose Informant Patient Reported? Taking?   BLACK COHOSH ORAL 6/3/2024 at am Spouse/Significant Other Yes Yes   Sig: Take 40 mg by mouth once daily in the morning.   DULoxetine (Cymbalta) 60 mg DR capsule 6/3/2024 at am Spouse/Significant Other Yes Yes   Si capsule (60 mg) once every 24 hours.   LORazepam (Ativan) 1 mg tablet 6/3/2024 at am Spouse/Significant Other Yes Yes   Sig: Take 2 tablets (2 mg) by mouth once daily in the morning.   LORazepam (Ativan) 1 mg tablet 2024 at pm Spouse/Significant Other Yes Yes   Sig: Take 1 tablet (1 mg) by mouth once daily at bedtime.   acetaminophen-codeine (Tylenol w/ Codeine #3) 300-30 mg tablet 2024 Spouse/Significant Other Yes No   Sig: Take 1 tablet by mouth every 6 hours if needed.   albuterol 2.5 mg /3 mL (0.083 %) nebulizer solution Unknown Spouse/Significant Other Yes No   Sig: Take 3 mL (2.5 mg) by nebulization every 4 hours if needed for wheezing.   albuterol 90 mcg/actuation aerosol powdr breath activated inhaler 2024 at am Spouse/Significant Other Yes Yes   Sig: Inhale 2 puffs every 6 hours if needed for wheezing or shortness of breath.   atorvastatin (Lipitor) 20 mg tablet 2024 at pm Spouse/Significant Other Yes Yes   Sig: Take 1 tablet (20 mg) by mouth once daily.   cyanocobalamin (Vitamin B-12) 1,000 mcg/mL injection 2024 Spouse/Significant Other No No   Sig: INJECT 1 ML INTRAMUSCULARLY ONCE EVERY MONTH.   diphenhydrAMINE-acetaminophen (Tylenol PM)  mg per tablet 2024 at pm Spouse/Significant Other Yes Yes   Sig: Take  2 tablets by mouth as needed at bedtime for sleep.   fluticasone-umeclidin-vilanter (Trelegy Ellipta) 200-62.5-25 mcg blister with device 6/2/2024 at am Spouse/Significant Other No Yes   Sig: Inhale 1 puff once daily. Rinse mouth with water after use to reduce aftertaste and incidence of candidiasis. Do not swallow.   folic acid (Folvite) 400 mcg tablet 6/3/2024 at am Spouse/Significant Other Yes Yes   Sig: Take 2.5 tablets (1 mg) by mouth once daily in the morning.   gabapentin (Neurontin) 300 mg capsule 6/3/2024 at am Spouse/Significant Other Yes Yes   Sig: Take 3 capsules (900 mg) by mouth 2 times a day.   hydrOXYzine HCL (Atarax) 25 mg tablet 6/3/2024 at am Spouse/Significant Other Yes Yes   Sig: Take 1 tablet (25 mg) by mouth 2 times a day.   levothyroxine (Synthroid, Levoxyl) 25 mcg tablet 6/3/2024 at am Spouse/Significant Other Yes Yes   Sig: Take 1 tablet (25 mcg) by mouth once daily in the morning. Take before meals.   melatonin 10 mg tablet 6/2/2024 at pm Spouse/Significant Other Yes Yes   Sig: Take 2 tablets (20 mg) by mouth once daily at bedtime.   rOPINIRole (Requip) 1 mg tablet 6/2/2024 at pm Spouse/Significant Other Yes Yes   Sig: Take 1 tablet (1 mg) by mouth once daily at bedtime.   tiZANidine (Zanaflex) 4 mg tablet 6/2/2024 at pm Spouse/Significant Other Yes Yes   Sig: Take 2 tablets (8 mg) by mouth once daily at bedtime.  1 tablet as needed Orally Three times a day      Facility-Administered Medications: None         The list below reflectives the updated allergy list. Please review each documented allergy for additional clarification and justification.  Allergies  Reviewed by Aiden Navarrete RN on 6/3/2024        Severity Reactions Comments    Bupropion Not Specified Other, Unknown     Buspirone Not Specified Other     Cilostazol Not Specified Unknown     Latex Not Specified Other BLISTER    Pseudoephedrine Not Specified Other, Unknown     Nsaids (non-steroidal Anti-inflammatory Drug) Low  Palpitations             Below are additional concerns with the patient's PTA list.  Spouse able to verify and provide last doses    Geno Betts

## 2024-06-04 PROBLEM — R06.02 SHORTNESS OF BREATH: Status: ACTIVE | Noted: 2024-06-04

## 2024-06-04 LAB
ANION GAP SERPL CALC-SCNC: 14 MMOL/L (ref 10–20)
BACTERIA UR CULT: NORMAL
BUN SERPL-MCNC: 17 MG/DL (ref 6–23)
CALCIUM SERPL-MCNC: 8.8 MG/DL (ref 8.6–10.3)
CHLORIDE SERPL-SCNC: 100 MMOL/L (ref 98–107)
CO2 SERPL-SCNC: 27 MMOL/L (ref 21–32)
CREAT SERPL-MCNC: 0.93 MG/DL (ref 0.5–1.05)
EGFRCR SERPLBLD CKD-EPI 2021: 68 ML/MIN/1.73M*2
ERYTHROCYTE [DISTWIDTH] IN BLOOD BY AUTOMATED COUNT: 12.7 % (ref 11.5–14.5)
GLUCOSE SERPL-MCNC: 129 MG/DL (ref 74–99)
HCT VFR BLD AUTO: 34.4 % (ref 36–46)
HGB BLD-MCNC: 10.8 G/DL (ref 12–16)
HOLD SPECIMEN: NORMAL
MAGNESIUM SERPL-MCNC: 2.41 MG/DL (ref 1.6–2.4)
MAGNESIUM SERPL-MCNC: 2.86 MG/DL (ref 1.6–2.4)
MCH RBC QN AUTO: 28.2 PG (ref 26–34)
MCHC RBC AUTO-ENTMCNC: 31.4 G/DL (ref 32–36)
MCV RBC AUTO: 90 FL (ref 80–100)
NRBC BLD-RTO: 0 /100 WBCS (ref 0–0)
PLATELET # BLD AUTO: 253 X10*3/UL (ref 150–450)
POTASSIUM SERPL-SCNC: 4.2 MMOL/L (ref 3.5–5.3)
RBC # BLD AUTO: 3.83 X10*6/UL (ref 4–5.2)
SODIUM SERPL-SCNC: 137 MMOL/L (ref 136–145)
WBC # BLD AUTO: 10.7 X10*3/UL (ref 4.4–11.3)

## 2024-06-04 PROCEDURE — 97161 PT EVAL LOW COMPLEX 20 MIN: CPT | Mod: GP

## 2024-06-04 PROCEDURE — 83735 ASSAY OF MAGNESIUM: CPT | Performed by: PHYSICIAN ASSISTANT

## 2024-06-04 PROCEDURE — 2500000001 HC RX 250 WO HCPCS SELF ADMINISTERED DRUGS (ALT 637 FOR MEDICARE OP): Performed by: PHYSICIAN ASSISTANT

## 2024-06-04 PROCEDURE — 80048 BASIC METABOLIC PNL TOTAL CA: CPT | Performed by: PHYSICIAN ASSISTANT

## 2024-06-04 PROCEDURE — 94640 AIRWAY INHALATION TREATMENT: CPT

## 2024-06-04 PROCEDURE — 2500000004 HC RX 250 GENERAL PHARMACY W/ HCPCS (ALT 636 FOR OP/ED): Performed by: STUDENT IN AN ORGANIZED HEALTH CARE EDUCATION/TRAINING PROGRAM

## 2024-06-04 PROCEDURE — 2500000002 HC RX 250 W HCPCS SELF ADMINISTERED DRUGS (ALT 637 FOR MEDICARE OP, ALT 636 FOR OP/ED): Performed by: PHYSICIAN ASSISTANT

## 2024-06-04 PROCEDURE — 2500000004 HC RX 250 GENERAL PHARMACY W/ HCPCS (ALT 636 FOR OP/ED): Performed by: PHYSICIAN ASSISTANT

## 2024-06-04 PROCEDURE — 83735 ASSAY OF MAGNESIUM: CPT | Mod: 91 | Performed by: PHYSICIAN ASSISTANT

## 2024-06-04 PROCEDURE — 2500000005 HC RX 250 GENERAL PHARMACY W/O HCPCS: Performed by: STUDENT IN AN ORGANIZED HEALTH CARE EDUCATION/TRAINING PROGRAM

## 2024-06-04 PROCEDURE — 9420000001 HC RT PATIENT EDUCATION 5 MIN

## 2024-06-04 PROCEDURE — 36415 COLL VENOUS BLD VENIPUNCTURE: CPT | Performed by: PHYSICIAN ASSISTANT

## 2024-06-04 PROCEDURE — 84145 PROCALCITONIN (PCT): CPT | Mod: GEALAB | Performed by: PHYSICIAN ASSISTANT

## 2024-06-04 PROCEDURE — 2500000002 HC RX 250 W HCPCS SELF ADMINISTERED DRUGS (ALT 637 FOR MEDICARE OP, ALT 636 FOR OP/ED): Mod: MUE | Performed by: STUDENT IN AN ORGANIZED HEALTH CARE EDUCATION/TRAINING PROGRAM

## 2024-06-04 PROCEDURE — 2500000001 HC RX 250 WO HCPCS SELF ADMINISTERED DRUGS (ALT 637 FOR MEDICARE OP): Performed by: INTERNAL MEDICINE

## 2024-06-04 PROCEDURE — 1200000002 HC GENERAL ROOM WITH TELEMETRY DAILY

## 2024-06-04 PROCEDURE — 97165 OT EVAL LOW COMPLEX 30 MIN: CPT | Mod: GO

## 2024-06-04 PROCEDURE — 85027 COMPLETE CBC AUTOMATED: CPT | Performed by: PHYSICIAN ASSISTANT

## 2024-06-04 PROCEDURE — 94761 N-INVAS EAR/PLS OXIMETRY MLT: CPT

## 2024-06-04 PROCEDURE — 94760 N-INVAS EAR/PLS OXIMETRY 1: CPT

## 2024-06-04 RX ORDER — PREDNISONE 5 MG/1
10 TABLET ORAL DAILY
Status: DISCONTINUED | OUTPATIENT
Start: 2024-06-12 | End: 2024-06-06

## 2024-06-04 RX ORDER — LORAZEPAM 1 MG/1
1 TABLET ORAL NIGHTLY
Status: DISCONTINUED | OUTPATIENT
Start: 2024-06-04 | End: 2024-06-07 | Stop reason: HOSPADM

## 2024-06-04 RX ORDER — BENZONATATE 100 MG/1
100 CAPSULE ORAL 3 TIMES DAILY PRN
Status: DISCONTINUED | OUTPATIENT
Start: 2024-06-04 | End: 2024-06-07 | Stop reason: HOSPADM

## 2024-06-04 RX ORDER — AZITHROMYCIN 250 MG/1
TABLET, FILM COATED ORAL
Qty: 3 TABLET | Refills: 0 | Status: SHIPPED | OUTPATIENT
Start: 2024-06-05 | End: 2024-06-07 | Stop reason: HOSPADM

## 2024-06-04 RX ORDER — PREDNISONE 20 MG/1
40 TABLET ORAL DAILY
Status: COMPLETED | OUTPATIENT
Start: 2024-06-04 | End: 2024-06-05

## 2024-06-04 RX ORDER — LORAZEPAM 1 MG/1
1 TABLET ORAL NIGHTLY
Status: DISCONTINUED | OUTPATIENT
Start: 2024-06-04 | End: 2024-06-04

## 2024-06-04 RX ORDER — PREDNISONE 20 MG/1
20 TABLET ORAL DAILY
Status: DISCONTINUED | OUTPATIENT
Start: 2024-06-09 | End: 2024-06-06

## 2024-06-04 RX ORDER — MAGNESIUM HYDROXIDE 2400 MG/10ML
10 SUSPENSION ORAL DAILY
Status: DISCONTINUED | OUTPATIENT
Start: 2024-06-04 | End: 2024-06-05

## 2024-06-04 RX ORDER — BENZONATATE 100 MG/1
100 CAPSULE ORAL 3 TIMES DAILY PRN
Qty: 42 CAPSULE | Refills: 0 | Status: SHIPPED | OUTPATIENT
Start: 2024-06-04

## 2024-06-04 RX ORDER — AMOXICILLIN AND CLAVULANATE POTASSIUM 875; 125 MG/1; MG/1
1 TABLET, FILM COATED ORAL 2 TIMES DAILY
Qty: 10 TABLET | Refills: 0 | Status: SHIPPED | OUTPATIENT
Start: 2024-06-05 | End: 2024-06-10

## 2024-06-04 RX ORDER — LORAZEPAM 1 MG/1
2 TABLET ORAL DAILY
Status: DISCONTINUED | OUTPATIENT
Start: 2024-06-05 | End: 2024-06-07 | Stop reason: HOSPADM

## 2024-06-04 RX ADMIN — MAGNESIUM HYDROXIDE 10 ML: 2400 SUSPENSION ORAL at 16:25

## 2024-06-04 RX ADMIN — ROPINIROLE HYDROCHLORIDE 1 MG: 1 TABLET, FILM COATED ORAL at 21:54

## 2024-06-04 RX ADMIN — GABAPENTIN 600 MG: 300 CAPSULE ORAL at 21:54

## 2024-06-04 RX ADMIN — LORAZEPAM 1 MG: 1 TABLET ORAL at 22:14

## 2024-06-04 RX ADMIN — LEVOTHYROXINE SODIUM 25 MCG: 25 TABLET ORAL at 06:22

## 2024-06-04 RX ADMIN — IPRATROPIUM BROMIDE AND ALBUTEROL SULFATE 3 ML: 2.5; .5 SOLUTION RESPIRATORY (INHALATION) at 19:41

## 2024-06-04 RX ADMIN — TIZANIDINE 4 MG: 4 TABLET ORAL at 21:54

## 2024-06-04 RX ADMIN — DOCUSATE SODIUM 100 MG: 100 CAPSULE, LIQUID FILLED ORAL at 21:50

## 2024-06-04 RX ADMIN — ATORVASTATIN CALCIUM 20 MG: 20 TABLET, FILM COATED ORAL at 21:50

## 2024-06-04 RX ADMIN — PREDNISONE 40 MG: 20 TABLET ORAL at 16:24

## 2024-06-04 RX ADMIN — CEFTRIAXONE SODIUM 2 G: 2 INJECTION, SOLUTION INTRAVENOUS at 15:47

## 2024-06-04 RX ADMIN — Medication 4 L/MIN: at 08:19

## 2024-06-04 RX ADMIN — IPRATROPIUM BROMIDE AND ALBUTEROL SULFATE 3 ML: 2.5; .5 SOLUTION RESPIRATORY (INHALATION) at 14:32

## 2024-06-04 RX ADMIN — ONDANSETRON 4 MG: 2 INJECTION INTRAMUSCULAR; INTRAVENOUS at 09:17

## 2024-06-04 RX ADMIN — GABAPENTIN 600 MG: 300 CAPSULE ORAL at 09:17

## 2024-06-04 RX ADMIN — ACETAMINOPHEN 650 MG: 325 TABLET ORAL at 00:38

## 2024-06-04 RX ADMIN — Medication 4 L/MIN: at 19:41

## 2024-06-04 RX ADMIN — ACETAMINOPHEN 650 MG: 325 TABLET ORAL at 16:28

## 2024-06-04 RX ADMIN — LORAZEPAM 1 MG: 1 TABLET ORAL at 17:44

## 2024-06-04 RX ADMIN — AZITHROMYCIN MONOHYDRATE 500 MG: 500 INJECTION, POWDER, LYOPHILIZED, FOR SOLUTION INTRAVENOUS at 14:39

## 2024-06-04 RX ADMIN — LORAZEPAM 1 MG: 1 TABLET ORAL at 09:24

## 2024-06-04 RX ADMIN — ACETAMINOPHEN 650 MG: 325 TABLET ORAL at 09:17

## 2024-06-04 RX ADMIN — DOCUSATE SODIUM 100 MG: 100 CAPSULE, LIQUID FILLED ORAL at 09:17

## 2024-06-04 RX ADMIN — DULOXETINE HYDROCHLORIDE 60 MG: 60 CAPSULE, DELAYED RELEASE ORAL at 09:17

## 2024-06-04 RX ADMIN — FOLIC ACID 1 MG: 1 TABLET ORAL at 09:17

## 2024-06-04 RX ADMIN — IPRATROPIUM BROMIDE AND ALBUTEROL SULFATE 3 ML: 2.5; .5 SOLUTION RESPIRATORY (INHALATION) at 08:19

## 2024-06-04 ASSESSMENT — COGNITIVE AND FUNCTIONAL STATUS - GENERAL
CLIMB 3 TO 5 STEPS WITH RAILING: A LITTLE
MOBILITY SCORE: 23
CLIMB 3 TO 5 STEPS WITH RAILING: A LITTLE
DAILY ACTIVITIY SCORE: 24
DAILY ACTIVITIY SCORE: 24
MOBILITY SCORE: 23

## 2024-06-04 ASSESSMENT — ACTIVITIES OF DAILY LIVING (ADL)
ADL_ASSISTANCE: INDEPENDENT
LACK_OF_TRANSPORTATION: NO
ADL_ASSISTANCE: INDEPENDENT

## 2024-06-04 ASSESSMENT — PAIN - FUNCTIONAL ASSESSMENT
PAIN_FUNCTIONAL_ASSESSMENT: 0-10

## 2024-06-04 ASSESSMENT — PAIN DESCRIPTION - LOCATION
LOCATION: HEAD

## 2024-06-04 ASSESSMENT — PAIN SCALES - GENERAL
PAINLEVEL_OUTOF10: 0 - NO PAIN
PAINLEVEL_OUTOF10: 5 - MODERATE PAIN
PAINLEVEL_OUTOF10: 9
PAINLEVEL_OUTOF10: 0 - NO PAIN
PAINLEVEL_OUTOF10: 9
PAINLEVEL_OUTOF10: 0 - NO PAIN
PAINLEVEL_OUTOF10: 3

## 2024-06-04 ASSESSMENT — PAIN DESCRIPTION - ORIENTATION: ORIENTATION: RIGHT;MID

## 2024-06-04 ASSESSMENT — PAIN DESCRIPTION - DESCRIPTORS: DESCRIPTORS: ACHING

## 2024-06-04 NOTE — CONSULTS
"  Nutrition Assessment     Reason for Assessment: Admission nursing screening    68 y.o. female adm with Community acquired bacterial pneumonia    Food and Nutrient History: Per MD note (6/3) \"medical history of COPD (on 4L/min of oxygen at home) right lower lung adenocarcinoma, found to have positive lymph nodes and recently referred to medical oncology, carotid artery stenosis, CAD, PAD, HLD, hypothyroidism, migraines, CVA/TIA in 2006 without residual deficits, anxiety with night terrors presenting with SOB, cough, weakness, confusion, multiple falls and abdominal pain that began 3 days ago.  She feels like her legs just gave out.  Denies any injuries from falling.  Denies CP.  She has not had BM in 5 days.  She has not had much of an appetite.  Endorses mild nausea, no vomiting.\"                   Difficulty chewing/swallowing: none noted       Per Flowsheet Percent Meal intake: 75,100%    Dietary Orders (From admission, onward)       Start     Ordered    06/03/24 1726  May Participate in Room Service  Once        Question:  .  Answer:  Yes    06/03/24 1726    06/03/24 1525  Adult diet Regular  Diet effective now        Question:  Diet type  Answer:  Regular    06/03/24 1524                  Feeding assistance level: Independent    Current Facility-Administered Medications:     acetaminophen (Tylenol) tablet 650 mg, 650 mg, oral, q6h PRN, Jessica Smith PA-C, 650 mg at 06/04/24 0917    albuterol 2.5 mg /3 mL (0.083 %) nebulizer solution 2.5 mg, 2.5 mg, nebulization, q2h PRN, Nicole De La Fuente MD    atorvastatin (Lipitor) tablet 20 mg, 20 mg, oral, Nightly, Jessica Smith PA-C, 20 mg at 06/03/24 2149    azithromycin (Zithromax) in dextrose 5 % in water (D5W) 250 mL  mg, 500 mg, intravenous, q24h, Janene Garcia MD, Last Rate: 250 mL/hr at 06/04/24 1439, 500 mg at 06/04/24 1439    benzonatate (Tessalon) capsule 100 mg, 100 mg, oral, TID PRN, Jessica Smith PA-C    cefTRIAXone (Rocephin) 2 g in dextrose 5% in " water (D5W) 50 mL, 2 g, intravenous, q24h, Janene Garcia MD, Last Rate: 100 mL/hr at 06/04/24 1439, 2 g at 06/04/24 1439    docusate sodium (Colace) capsule 100 mg, 100 mg, oral, BID, Jessica Smith PA-C, 100 mg at 06/04/24 0917    DULoxetine (Cymbalta) DR capsule 60 mg, 60 mg, oral, q24h, Jessica Smith PA-C, 60 mg at 06/04/24 0917    tiotropium (Spiriva Respimat) 2.5 mcg/actuation inhaler 2 puff, 2 puff, inhalation, Daily **AND** fluticasone furoate-vilanteroL (Breo Ellipta) 200-25 mcg/dose inhaler 1 puff, 1 puff, inhalation, Daily, Jessica Smith PA-C    folic acid (Folvite) tablet 1 mg, 1 mg, oral, q AM, Jessica Smith PA-C, 1 mg at 06/04/24 0917    gabapentin (Neurontin) capsule 600 mg, 600 mg, oral, BID, Jessica Smith PA-C, 600 mg at 06/04/24 0917    ipratropium-albuteroL (Duo-Neb) 0.5-2.5 mg/3 mL nebulizer solution 3 mL, 3 mL, nebulization, TID, Nicole De La Fuente MD, 3 mL at 06/04/24 1432    levothyroxine (Synthroid, Levoxyl) tablet 25 mcg, 25 mcg, oral, Daily before breakfast, Jessica Smith PA-C, 25 mcg at 06/04/24 0622    LORazepam (Ativan) tablet 1 mg, 1 mg, oral, q8h PRN, Jessica Smith PA-C, 1 mg at 06/04/24 0924    magnesium hydroxide (Milk of Magnesia) 2,400 mg/10 mL suspension 10 mL, 10 mL, oral, Daily, Jessica Smith PA-C    ondansetron (Zofran) injection 4 mg, 4 mg, intravenous, q6h PRN, Jessica Smith PA-C, 4 mg at 06/04/24 0917    oxygen (O2) therapy, , inhalation, Continuous PRN - O2/gases, Nicole De La Fuente MD, 4 L/min at 06/04/24 0819    polyethylene glycol (Glycolax, Miralax) packet 17 g, 17 g, oral, Daily, Jessica Smith PA-C, 17 g at 06/03/24 1731    predniSONE (Deltasone) tablet 40 mg, 40 mg, oral, Daily **FOLLOWED BY** [START ON 6/6/2024] predniSONE (Deltasone) tablet 30 mg, 30 mg, oral, Daily **FOLLOWED BY** [START ON 6/9/2024] predniSONE (Deltasone) tablet 20 mg, 20 mg, oral, Daily **FOLLOWED BY** [START ON 6/12/2024] predniSONE (Deltasone) tablet 10 mg, 10 mg, oral, Daily, Jessica Smith,  "FELIPE    rOPINIRole (Requip) tablet 1 mg, 1 mg, oral, Nightly, Jessica Smith PA-C, 1 mg at 06/03/24 2150    tiZANidine (Zanaflex) tablet 4 mg, 4 mg, oral, Nightly PRN, Jessica Smith PA-C  Results from last 7 days   Lab Units 06/04/24  0613 06/03/24  1302   GLUCOSE mg/dL 129* 104*   SODIUM mmol/L 137 132*   POTASSIUM mmol/L 4.2 3.6   CHLORIDE mmol/L 100 94*   CO2 mmol/L 27 29   BUN mg/dL 17 21   CREATININE mg/dL 0.93 1.06*   EGFR mL/min/1.73m*2 68 58*   CALCIUM mg/dL 8.8 8.5*   MAGNESIUM mg/dL 2.86* 3.12*     No results found for: \"HGBA1C\"        Per Flowsheet:  Gastrointestinal  Gastrointestinal (WDL): Within Defined Limits  Last bowel movement documented:    Past Medical History:   Diagnosis Date    Age-related nuclear cataract, bilateral 12/07/2015    Cataract, nuclear sclerotic, both eyes    Age-related nuclear cataract, right eye 04/01/2016    Age-related nuclear cataract of right eye    Aphakia, left eye 01/21/2016    Aphakia of left eye    COPD (chronic obstructive pulmonary disease) (Multi)     Cortical age-related cataract, bilateral 12/07/2015    Cortical age-related cataract of both eyes    Hodgkin lymphoma, unspecified, unspecified site (Multi) 12/04/2013    Hodgkin's disease    Hypermetropia, bilateral 04/01/2016    Hyperopia of both eyes with astigmatism and presbyopia    Lung cancer (Multi) 9/13/2023@ ,    Other chest pain 07/16/2021    Atypical chest pain    Other conditions influencing health status 12/07/2015    PSC (posterior subcapsular cataract), bilateral    Other postherpetic nervous system involvement 07/16/2021    Post herpetic neuralgia    Personal history of other diseases of the respiratory system     History of chronic obstructive lung disease    Personal history of other infectious and parasitic diseases 07/16/2021    History of herpes zoster    Personal history of pneumonia (recurrent) 08/07/2014    History of pneumonia    Posterior subcapsular polar age-related cataract, right eye " "04/01/2016    Posterior subcapsular polar age-related cataract of right eye    Presence of intraocular lens 01/21/2016    Pseudophakia of left eye    Stroke (Multi) 2000      Past Surgical History:   Procedure Laterality Date    CATARACT EXTRACTION  02/24/2016    Cataract Surgery    HYSTERECTOMY  09/18/2014    Hysterectomy    LUNG SURGERY  09/13/2023    STRABISMUS SURGERY  12/07/2015    Strabismus Surgery    TUBAL LIGATION  6/1984      Allergies: Bupropion, Buspirone, Cilostazol, Latex, Pseudoephedrine, and Nsaids (non-steroidal anti-inflammatory drug)     Anthropometrics:  Height: 157.5 cm (5' 2\")  Weight: 69.9 kg (154 lb 1.6 oz)  BMI (Calculated): 28.18      Daily Weight  06/04/24 : 69.9 kg (154 lb 1.6 oz)  04/16/24 : 67.1 kg (148 lb)  03/06/24 : 66.8 kg (147 lb 4.3 oz)  03/05/24 : 67.1 kg (148 lb)  12/06/23 : 68.6 kg (151 lb 5.5 oz)  12/01/23 : 67.1 kg (148 lb)  11/28/23 : 68 kg (150 lb)  10/25/23 : 68.6 kg (151 lb 3.8 oz)  10/13/23 : 68 kg (149 lb 14.6 oz)  10/10/23 : 68.7 kg (151 lb 7.3 oz)    Weight History / % Weight Change: one outliar weight, per past medical records weight appears stable    Skin: none noted (please see nursing/wound notes for further details)  Edema: none noted  Pain Score: 0 - No pain    Estimated Nutritional Needs:  Energy Needs: 1748-2097kcal (25-30kcal/kg CBW)  Protein Needs: 70g (1g/kg CBW)  Fluid Needs: (1mL/kcal) or per MD                   Nutrition Focused Physical Findings: defer                       Nutrition Diagnosis        Patient has Nutrition Diagnosis: Yes  New  Nutrition Diagnosis 1: Inadequate oral intake  Related to (1): current medical status  As Evidenced by (1): 5days poor appetite PTA      Nutrition Interventions/Recommendations   Intervention:  Coordination of Care: n/a        Individualized Nutrition Prescription Provided for : continue liberal diet to encourage PO as able, monitor intakes for continued adequacy    Education: Available if needs arise.    Goals: " oral intake >75%       Nutrition Monitoring and Evaluation   Weights  Labs  PO intake              Time Spent (min): 60 minutes

## 2024-06-04 NOTE — PROGRESS NOTES
Fatmata Plummer is a 68 y.o. female on day 0 of admission presenting with Community acquired bacterial pneumonia.      Subjective   Slowly improving, cough is dry. Tolerating meals.  Denies CP.  Feels SOB with ambulation.       Objective     Last Recorded Vitals  /79   Pulse 106   Temp 36.5 °C (97.7 °F) (Temporal)   Resp 16   Wt 69.9 kg (154 lb 1.6 oz)   SpO2 92%   Intake/Output last 3 Shifts:    Intake/Output Summary (Last 24 hours) at 6/4/2024 1455  Last data filed at 6/4/2024 1301  Gross per 24 hour   Intake 760 ml   Output --   Net 760 ml       Admission Weight  Weight: 56.7 kg (125 lb) (06/03/24 1050)    Daily Weight  06/04/24 : 69.9 kg (154 lb 1.6 oz)    Image Results  CT abdomen pelvis w IV contrast  Narrative: Interpreted By:  Shivani Britt and O'Connor Gregory   STUDY:  CT ABDOMEN PELVIS W IV CONTRAST;  6/3/2024 2:12 pm      INDICATION:  Signs/Symptoms:Right sided abdominal pain.      COMPARISON:  PET-CT dated 08/14/2023      ACCESSION NUMBER(S):  [id number]      ORDERING CLINICIAN:  TAYA EDEN      TECHNIQUE:  CT of the abdomen and pelvis was performed.  Standard contiguous  axial images were obtained at 3 mm slice thickness through the  abdomen and pelvis. Coronal and sagittal reconstructions at 3 mm  slice thickness were performed.      75 ml of contrast Omnipaque 350 were administered intravenously  without immediate complication.      FINDINGS:  LOWER CHEST:  Airspace opacities in the visualized right lung base. Please see  dedicated CT of the chest dictated separately.      ABDOMEN:      LIVER:  The liver is normal in size without evidence of focal liver lesions.      BILE DUCTS:  The intrahepatic and extrahepatic ducts are not dilated.      GALLBLADDER:  No calcified stones. No wall thickening.      PANCREAS:  There is a degree of fatty atrophy of the pancreatic parenchyma. No  mass lesions or pancreatic ductal dilatation.      SPLEEN:  The spleen is normal in size without focal  lesions.      ADRENAL GLANDS:  Bilateral adrenal glands appear normal.      KIDNEYS AND URETERS:  The kidneys are normal in size and enhance symmetrically. 1.5 cm  simple cyst arising from the superior pole of the right kidney. No  hydroureteronephrosis or nephroureterolithiasis is identified.      PELVIS:      BLADDER:  The urinary bladder appears normal without abnormal wall thickening.      REPRODUCTIVE ORGANS:  Status post hysterectomy. No pelvic masses.      BOWEL:  The stomach is unremarkable.   The small and large bowel are normal  in caliber and demonstrate no wall thickening. Moderate colonic stool  burden. The appendix is not definitely visualized. There is however  no pericecal stranding or fluid.      VESSELS:  There is no aneurysmal dilatation of the abdominal aorta. There are  moderate atherosclerotic calcifications of the abdominal aorta and  its branches. Stent within the left common iliac artery appears  patent.      PERITONEUM/RETROPERITONEUM/LYMPH NODES:  No ascites or free air, no fluid collection.  No abdominopelvic  lymphadenopathy is present.      BONES AND ABDOMINAL WALL:  No suspicious osseous lesions are identified.  The abdominal wall  soft tissues appear normal.      Impression: 1.  No evidence of acute process in the abdomen or pelvis.  2. Airspace disease in the visualized right lung base. Please see  dedicated CT of the chest dictated separately.      I personally reviewed the images/study and I agree with the findings  as stated by resident physician Dr. Dima Thurston.      MACRO:  None      Signed by: Shivani Britt 6/3/2024 2:55 PM  Dictation workstation:   [id number]  CT angio chest for pulmonary embolism  Narrative: Interpreted By:  Shivani Britt and O'Connor Gregory   STUDY:  CT ANGIO CHEST FOR PULMONARY EMBOLISM;  6/3/2024 2:12 pm      INDICATION:  Signs/Symptoms:sob, hx of lung cancer s/p resection, confusion.      COMPARISON:  CT chest dated 03/04/2024      ACCESSION  NUMBER(S):  [id number]      ORDERING CLINICIAN:  TAYA EDEN      TECHNIQUE:  Helical data acquisition of the chest was obtained after intravenous  administration of 75 ML Omnipaque 350, as per PE protocol. Images  were reformatted in coronal and sagittal planes. Axial and coronal  maximum intensity projection (MIP) images were created and reviewed.      FINDINGS:  POTENTIAL LIMITATIONS OF THE STUDY: None      HEART AND VESSELS:  There are no discrete filling defects within main pulmonary artery  and its branches to suggest acute pulmonary embolism. Main pulmonary  artery and its branches are normal in caliber.      The thoracic aorta normal in course and caliber.There is mild to  moderate scattered atherosclerosis present, including calcified and  noncalcified plaques.Although, the study is not tailored for  evaluation of aorta, there is no definite evidence of acute aortic  pathology.      Mild coronary artery calcifications are seen. Please note,the study  is not optimized for evaluation of coronary arteries.      The cardiac chambers are not enlarged.There are no findings to  suggest right heart strain. There is no pericardial effusion seen.      MEDIASTINUM AND OREN, LOWER NECK AND AXILLA:  The visualized thyroid gland is within normal limits.  New right hilar and subcarinal lymphadenopathy, for example measuring  1.3 cm in short axis in the right hilum and 1.0 cm in short axis in  the subcarinal station. Additionally there is a mildly enlarged 1.1  cm right lower paratracheal lymph node. LUNGS AND AIRWAYS:  The trachea and central airways are patent.      New consolidative and reticular opacities in the right lower lobe,  posterior aspect of the right upper lobe, and to a lesser extent in  the posterior aspect of the right middle lobe. Trace right-sided  pleural effusion. Postsurgical changes are again noted in the right  lower lobe, with assessment of nodularity along the suture line now  limited due to the  adjacent consolidative opacities.      Background of moderate centrilobular and paraseptal emphysematous  change.      UPPER ABDOMEN:  Please see dedicated CT of the abdomen and pelvis, dictated  separately.      CHEST WALL AND OSSEOUS STRUCTURES:  Chest wall is within normal limits.  No acute osseous pathology.There are no suspicious osseous lesions.      Impression: 1. No evidence of acute pulmonary embolism.  2. New right lung airspace disease concerning for pneumonia.  Follow-up imaging post treatment is recommended to exclude underlying  neoplastic recurrence.  3. New mildly enlarged right hilar and mediastinal lymph nodes which  may be reactive due to the above.  4. Moderate emphysema.      I personally reviewed the images/study and I agree with the findings  as stated by resident physician Dr. Dima Thurston.      MACRO:  None      Signed by: Shivani Britt 6/3/2024 2:52 PM  Dictation workstation:   [id number]  CT head wo IV contrast  Narrative: Interpreted By:  Shivani Britt and O'Connor Gregory   STUDY:  CT HEAD WO IV CONTRAST;  6/3/2024 2:12 pm      INDICATION:  Signs/Symptoms:confusion. History of lung cancer.      COMPARISON:  MRI brain dated 11/28/2023      ACCESSION NUMBER(S):  [id number]      ORDERING CLINICIAN:  TAYA EDEN      TECHNIQUE:  Noncontrast axial CT scan of head was performed. Angled reformats in  brain and bone windows were generated. The images were reviewed in  bone, brain, blood and soft tissue windows.      FINDINGS:  CSF Spaces: Slight disproportionate prominence of the frontal sulci  bilaterally. The ventricles, sulci and basal cisterns are otherwise  within normal limits. There is no extraaxial fluid collection.      Parenchyma: Periventricular white matter hypodensities likely due to  small vessel ischemic disease. The grey-white differentiation is  intact. There is no mass effect or midline shift.  There is no  intracranial hemorrhage.      Calvarium: The calvarium is  unremarkable.      Paranasal sinuses and mastoids: Visualized paranasal sinuses and  mastoids are clear.      Impression: No evidence of acute intracranial process.      Slight disproportionate frontal volume loss with prominence of the  sulci bilaterally..      I personally reviewed the images/study and I agree with the findings  as stated by resident physician Dr. Dima Thurston.      MACRO:  None      Signed by: Shivani Britt 6/3/2024 2:38 PM  Dictation workstation:   [id number]  ECG 12 lead  Normal sinus rhythm  Normal ECG  When compared with ECG of 26-APR-2022 18:35,  No significant change was found  XR chest 1 view  Narrative: Interpreted By:  Benjy Reyna,   STUDY:  XR CHEST 1 VIEW;  6/3/2024 11:24 am      INDICATION:  Signs/Symptoms:SOB.      COMPARISON:  10/10/2023.      ACCESSION NUMBER(S):  [id number]      ORDERING CLINICIAN:  TYAA EDEN      FINDINGS:  CARDIOMEDIASTINAL SILHOUETTE:  Aortic calcifications are again seen. Cardiac silhouette is stable  and not significantly enlarged.      LUNGS:  There is increased irregular opacification of the right lung base  which may represent combination of atelectasis and infiltrate. Small  right effusion not excluded. No left lung consolidation is seen. No  pneumothorax.      ABDOMEN:  No remarkable upper abdominal findings.      BONES:  No acute osseous changes.      Impression: 1.  Increased irregular opacification of the right lung base may  represent a combination of atelectasis and infiltrate. Small right  effusion not excluded.              MACRO:  None.      Signed by: Benjy Reyna 6/3/2024 11:48 AM  Dictation workstation:   [id number]      Physical Exam  Physical Exam  Gen: NAD  Eyes:  EOM intact  ENT: MMM  Neck: No JVD  Respiratory: decreased BS at bilateral bases, +rhonchi, scattered wheezes  Cardiac: RRR, no murmurs rubs or gallops  Abdomen: soft, NT, +BS  Extremities: no edema or cyanosis  Neuro: No focal deficits, alert and oriented x  3  Psych:  appropriate mood and behavior    Assessment/Plan      Principal Problem:    Community acquired bacterial pneumonia  Acute hypoxic respiratory failure  -SpO2 82% on baseline 4 L  -requiring 8 L with ambulation today  -continue CTX/azith  -nebs  -prednisone  -RT eval and treat  -procal pending  -home inhalers    Acute toxic encephalopathy 2/2 polypharm and likely hypoxemia  -possible related to acute infection  -CT head neg  -MRI brain neg for mets 6 months ago  -check utox: reflects Tylenol with codeine that she is taking as well as CBD gummies  -possible polypharmacy: gabapentin, ativan, tylenol #3 with codeine, tizanidine, requip, hydroxyzine  -reduce dose of gabapentin to 600 BID, hold hydroxyzine, tylenol #3  -monitor mentation  -PT/OT  -mentation improved  -adjust meds    Constipation  -moderate stool present on CT  -bowel regimen  -milk of mag now  -stool count    Hypermag  -checked after getting dose of mag in ED  -improved  -recheck in AM     Anxiety  -ativan PRN  -cymbalta     RLS  -requip     Hypothyroid  -TSH WNL  -synthroid     Dispo: Full admission for acute hypoxic respiratory failure 2/2 CAP  D/w Dr. Moses Smith, PA-C

## 2024-06-04 NOTE — CARE PLAN
The patient's goals for the shift include  safety, no increased O2 requirements    The clinical goals for the shift include Pt to sit up in chair for 1 hour this shift      Problem: Pain  Goal: My pain/discomfort is manageable  Outcome: Progressing     Problem: Safety  Goal: Patient will be injury free during hospitalization  Outcome: Progressing  Goal: I will remain free of falls  Outcome: Progressing     Problem: Daily Care  Goal: Daily care needs are met  Outcome: Progressing     Problem: Psychosocial Needs  Goal: Demonstrates ability to cope with hospitalization/illness  Outcome: Progressing  Goal: Collaborate with me, my family, and caregiver to identify my specific goals  Outcome: Progressing     Problem: Discharge Barriers  Goal: My discharge needs are met  Outcome: Progressing     Problem: Nutrition  Goal: Less than 5 days NPO/clear liquids  Outcome: Progressing  Goal: Oral intake greater than 50%  Outcome: Progressing  Goal: Oral intake greater 75%  Outcome: Progressing  Goal: Consume prescribed supplement  Outcome: Progressing  Goal: Adequate PO fluid intake  Outcome: Progressing  Goal: Nutrition support goals are met within 48 hrs  Outcome: Progressing  Goal: Nutrition support is meeting 75% of nutrient needs  Outcome: Progressing  Goal: Tube feed tolerance  Outcome: Progressing  Goal: BG  mg/dL  Outcome: Progressing  Goal: Lab values WNL  Outcome: Progressing  Goal: Electrolytes WNL  Outcome: Progressing  Goal: Promote healing  Outcome: Progressing  Goal: Maintain stable weight  Outcome: Progressing  Goal: Reduce weight from edema/fluid  Outcome: Progressing  Goal: Gradual weight gain  Outcome: Progressing  Goal: Improve ostomy output  Outcome: Progressing     Problem: Pain - Adult  Goal: Verbalizes/displays adequate comfort level or baseline comfort level  Outcome: Progressing     Problem: Safety - Adult  Goal: Free from fall injury  Outcome: Progressing     Problem: Discharge Planning  Goal:  Discharge to home or other facility with appropriate resources  Outcome: Progressing     Problem: Chronic Conditions and Co-morbidities  Goal: Patient's chronic conditions and co-morbidity symptoms are monitored and maintained or improved  Outcome: Progressing

## 2024-06-04 NOTE — PROGRESS NOTES
Occupational Therapy    Evaluation    Patient Name: Fatmata Plummer  MRN: [id number]  Today's Date: 6/4/2024  Time Calculation  Start Time: 0845  Stop Time: 0901  Time Calculation (min): 16 min    Assessment  IP OT Assessment  OT Assessment: Pt has no skilled OT needs  Prognosis: Excellent  Barriers to Discharge: None  Evaluation/Treatment Tolerance: Patient tolerated treatment well  Medical Staff Made Aware: Yes  End of Session Communication: Bedside nurse  End of Session Patient Position: Alarm on (Pt sitting EOB with all needs in reach)  Plan:  No Skilled OT: Independent with ADLs  OT Frequency: OT eval only  OT Discharge Recommendations: No further acute OT (Pt may benefit from pulm rehab)  OT Recommended Transfer Status: Stand by assist  OT - OK to Discharge: Yes (per OT POC)    Subjective   Current Problem:  1. Shortness of breath        2. Confusion        3. Pneumonia of right lung due to infectious organism, unspecified part of lung          General:  General  Reason for Referral: Pt is a 69 y/o woman who was admitted for SOB, cough, weakness, confusion, multiple falls and abdominal pain that began 3 days ago. (CTA chest ruled out PE but concerning for right lower lobe pneumonia.)  Past Medical History Relevant to Rehab: Past medical history of COPD (on 4L/min of oxygen at home) right lower lung adenocarcinoma, found to have positive lymph nodes and recently referred to medical oncology, carotid artery stenosis, CAD, PAD, HLD, hypothyroidism, migraines, CVA/TIA in 2006 without residual deficits, anxiety with night terrors  Family/Caregiver Present: No  Prior to Session Communication: Bedside nurse  Patient Position Received: Bed, 3 rail up, Alarm off, not on at start of session  General Comment: Pt pleasant and agreeable to OT eval. Pt wears 4 L of oxygen at baseline  Precautions:  Medical Precautions: Fall precautions, Oxygen therapy device and L/min (4 L of oxygen)  Vital Signs:  SpO2:  (90% at rest and 82%  after ambulation)  BP:  (144/75- lying; 113/75-sitting; 136/57-standing)  Pain:  Pain Assessment  Pain Assessment: 0-10  Pain Score: 9  Pain Type: Acute pain  Pain Location: Head  Pain Interventions: Other (Comment) (Alerted RN)    Objective   Cognition:  Overall Cognitive Status: Within Functional Limits  Orientation Level: Oriented X4     Home Living:  Type of Home: House  Lives With: Spouse  Home Adaptive Equipment: Cane  Home Layout: Two level, Able to live on main level with bedroom/bathroom  Home Access: Level entry  Bathroom Shower/Tub: Tub/shower unit  Bathroom Toilet: Handicapped height  Bathroom Equipment: Grab bars in shower  Home Living Comments: Pt stays on 1st floor except when she has to go upstairs to get her clothes. Full flight upstairs with one sided HR   Prior Function:  Level of [place]: Independent with ADLs and functional transfers, Independent with homemaking with ambulation  Receives Help From: Family  ADL Assistance: Independent  Homemaking Assistance: Independent  Ambulatory Assistance: Independent  Prior Function Comments: +driving    ADL:  ADL Comments: Anticipate SBA based on clinical presentation  Activity Tolerance:  Endurance: Tolerates less than 10 min exercise with changes in vital signs  Bed Mobility/Transfers: Bed Mobility  Bed Mobility: Yes  Bed Mobility 1  Bed Mobility 1: Supine to sitting  Level of Assistance 1: Contact guard  Bed Mobility Comments 1: use of bedrail    Transfers  Transfer: Yes  Transfer 1  Transfer From 1: Sit to, Stand to  Transfer to 1: Sit, Stand  Technique 1: Sit to stand, Stand to sit  Transfer Level of Assistance 1: Independent    Functional Mobility:  Functional Mobility  Functional Mobility Performed: Yes  Functional Mobility 1  Surface 1: Level tile  Device 1: No device  Assistance 1: Close supervision  Comments 1: Pt ambulated to and from the doorway twice with no AD at SBA  Sitting Balance:  Static Sitting Balance  Static Sitting-Balance  Support: Feet supported, No upper extremity supported  Static Sitting-Level of Assistance: Independent  Standing Balance:  Static Standing Balance  Static Standing-Balance Support: No upper extremity supported  Static Standing-Level of Assistance: Independent  Dynamic Standing Balance  Dynamic Standing-Balance Support: No upper extremity supported  Dynamic Standing-Comments: supervision    Sensation:  Light Touch: No apparent deficits  Strength:  Strength Comments: B UE's: WFL     Extremities: RUE   RUE : Within Functional Limits and LUE   LUE: Within Functional Limits    Outcome Measures: [facility] Daily Activity  Putting on and taking off regular lower body clothing: None  Bathing (including washing, rinsing, drying): None  Putting on and taking off regular upper body clothing: None  Toileting, which includes using toilet, bedpan or urinal: None  Taking care of personal grooming such as brushing teeth: None  Eating Meals: None  Daily Activity - Total Score: 24      Education Documentation  No documentation found.  Education Comments  No comments found.

## 2024-06-04 NOTE — PROGRESS NOTES
06/04/24 1103   Discharge Planning   Living Arrangements Spouse/significant other   Support Systems Spouse/significant other;Family members   Assistance Needed Patient is A&Ox3, wears 4L O2 at baseline (through [facility]), is independent with ADL's and uses no DME at home, drives. Patient denies further needs upon discharge   Type of Residence Private residence   Number of Stairs to Enter Residence 0   Number of Stairs Within Residence 12   Do you have animals or pets at home? Yes   Type of Animals or Pets 1 dog   Who is requesting discharge planning? Provider   Home or Post Acute Services None   Patient expects to be discharged to: Home no needs   Does the patient need discharge transport arranged? No   Financial Resource Strain   How hard is it for you to pay for the very basics like food, housing, medical care, and heating? Not very   Housing Stability   In the last 12 months, was there a time when you were not able to pay the mortgage or rent on time? N   In the last 12 months, how many places have you lived? 1   In the last 12 months, was there a time when you did not have a steady place to sleep or slept in a shelter (including now)? N   Transportation Needs   In the past 12 months, has lack of transportation kept you from medical appointments or from getting medications? no   In the past 12 months, has lack of transportation kept you from meetings, work, or from getting things needed for daily living? No

## 2024-06-04 NOTE — PROGRESS NOTES
Physical Therapy    Physical Therapy Evaluation    Patient Name: Fatmata Plummer  MRN: [id number]  Today's Date: 6/4/2024   Time Calculation  Start Time: 1305  Stop Time: 1316  Time Calculation (min): 11 min    Assessment/Plan   PT Assessment  Rehab Prognosis: Excellent  Barriers to Discharge: None  Evaluation/Treatment Tolerance: Patient tolerated treatment well  Medical Staff Made Aware: Yes  Strengths: Ability to acquire knowledge, Housing layout, Support of Caregivers  Barriers to Participation: Comorbidities  End of Session Communication: Bedside nurse  Assessment Comment: Patient tolerates mobility well, no use of device, SpO2 drops slightly with activity on 4L O2. No skilled PT necessary at this time.  End of Session Patient Position: Bed, 3 rail up, Alarm on     PT Plan  PT Plan: PT Eval only  PT Eval Only Reason: Safe to return home  PT Discharge Recommendations: No further acute PT  PT Recommended Transfer Status: Independent  PT - OK to Discharge: Yes (per PT POC)      Subjective   General Visit Information:  General  Reason for Referral: Impaired functional mobility; pneumonia  Referred By: Jordin Lopes  Past Medical History Relevant to Rehab: Past medical history of COPD (on 4L/min of oxygen at home) right lower lung adenocarcinoma, found to have positive lymph nodes and recently referred to medical oncology, carotid artery stenosis, CAD, PAD, HLD, hypothyroidism, migraines, CVA/TIA in 2006 without residual deficits, anxiety with night terrors  Family/Caregiver Present: No  Prior to Session Communication: Bedside nurse  Patient Position Received: Bed, 3 rail up, Alarm off, not on at start of session  General Comment: Patient pleasant, cooperative and agreeable to PT eval  Home Living:  Home Living  Type of Home: House  Lives With: Spouse  Home Adaptive Equipment: Cane  Home Layout: Two level, Able to live on main level with bedroom/bathroom  Home Access: Level entry  Bathroom Shower/Tub: Tub/shower  unit  Bathroom Toilet: Handicapped height  Bathroom Equipment: Grab bars in shower  Prior Level of Function:  Prior Function Per Pt/Caregiver Report  Level of [place]: Independent with ADLs and functional transfers, Independent with homemaking with ambulation  Receives Help From: Family  ADL Assistance: Independent  Homemaking Assistance: Independent  Ambulatory Assistance: Independent  Precautions:  Precautions  Medical Precautions: Fall precautions (4L O2)  Vital Signs:  Vital Signs  Heart Rate:  (111-119)  SpO2: (!) 89 % (following ambulation, 94% at rest)    Objective   Pain:  Pain Assessment  Pain Assessment: 0-10  Pain Score: 0 - No pain  Cognition:  Cognition  Overall Cognitive Status: Within Functional Limits  Orientation Level: Oriented X4    General Assessments:    Activity Tolerance  Endurance: Tolerates 10 - 20 min exercise with multiple rests    Sensation  Light Touch: No apparent deficits    Strength  Strength Comments: JOSUE LE 5/5    Coordination  Movements are Fluid and Coordinated: Yes    Postural Control  Postural Control: Within Functional Limits    Static Sitting Balance  Static Sitting-Balance Support: No upper extremity supported, Feet supported  Static Sitting-Level of Assistance: Independent    Static Standing Balance  Static Standing-Balance Support: No upper extremity supported  Static Standing-Level of Assistance: Independent  Functional Assessments:  Bed Mobility  Bed Mobility: Yes  Bed Mobility 1  Bed Mobility 1: Supine to sitting, Sitting to supine  Level of Assistance 1: Independent    Transfers  Transfer: Yes  Transfer 1  Transfer From 1: Sit to, Stand to  Transfer to 1: Sit, Stand  Technique 1: Sit to stand, Stand to sit  Transfer Level of Assistance 1: Independent    Ambulation/Gait Training  Ambulation/Gait Training Performed: Yes  Ambulation/Gait Training 1  Surface 1: Level tile  Device 1: No device  Assistance 1: Independent  Quality of Gait 1:  (steady  gait)  Comments/Distance (ft) 1: 40'    Outcome Measures:  [facility] Basic Mobility  Turning from your back to your side while in a flat bed without using bedrails: None  Moving from lying on your back to sitting on the side of a flat bed without using bedrails: None  Moving to and from bed to chair (including a wheelchair): None  Standing up from a chair using your arms (e.g. wheelchair or bedside chair): None  To walk in hospital room: None  Climbing 3-5 steps with railing: A little  Basic Mobility - Total Score: 23    Encounter Problems       Encounter Problems (Active)       Pain - Adult              Education Documentation  Precautions, taught by Tammy Ramirez PT at 6/4/2024  1:52 PM.  Learner: Patient  Readiness: Acceptance  Method: Explanation  Response: Verbalizes Understanding    Mobility Training, taught by Tammy Ramirez PT at 6/4/2024  1:52 PM.  Learner: Patient  Readiness: Acceptance  Method: Explanation  Response: Verbalizes Understanding    Education Comments  No comments found.

## 2024-06-05 PROBLEM — J15.9 COMMUNITY ACQUIRED BACTERIAL PNEUMONIA: Status: RESOLVED | Noted: 2024-06-03 | Resolved: 2024-06-05

## 2024-06-05 PROBLEM — R06.02 SHORTNESS OF BREATH: Status: RESOLVED | Noted: 2024-06-04 | Resolved: 2024-06-05

## 2024-06-05 LAB
ANION GAP SERPL CALC-SCNC: 12 MMOL/L (ref 10–20)
BUN SERPL-MCNC: 19 MG/DL (ref 6–23)
CALCIUM SERPL-MCNC: 9.2 MG/DL (ref 8.6–10.3)
CHLORIDE SERPL-SCNC: 98 MMOL/L (ref 98–107)
CO2 SERPL-SCNC: 31 MMOL/L (ref 21–32)
CREAT SERPL-MCNC: 1.03 MG/DL (ref 0.5–1.05)
EGFRCR SERPLBLD CKD-EPI 2021: 60 ML/MIN/1.73M*2
ERYTHROCYTE [DISTWIDTH] IN BLOOD BY AUTOMATED COUNT: 12.9 % (ref 11.5–14.5)
GLUCOSE SERPL-MCNC: 113 MG/DL (ref 74–99)
HCT VFR BLD AUTO: 36.3 % (ref 36–46)
HGB BLD-MCNC: 11.2 G/DL (ref 12–16)
MAGNESIUM SERPL-MCNC: 2.51 MG/DL (ref 1.6–2.4)
MCH RBC QN AUTO: 27.6 PG (ref 26–34)
MCHC RBC AUTO-ENTMCNC: 30.9 G/DL (ref 32–36)
MCV RBC AUTO: 89 FL (ref 80–100)
NRBC BLD-RTO: 0 /100 WBCS (ref 0–0)
PLATELET # BLD AUTO: 315 X10*3/UL (ref 150–450)
POTASSIUM SERPL-SCNC: 4.1 MMOL/L (ref 3.5–5.3)
PROCALCITONIN SERPL-MCNC: 0.28 NG/ML
RBC # BLD AUTO: 4.06 X10*6/UL (ref 4–5.2)
SODIUM SERPL-SCNC: 137 MMOL/L (ref 136–145)
WBC # BLD AUTO: 10.8 X10*3/UL (ref 4.4–11.3)

## 2024-06-05 PROCEDURE — 2500000005 HC RX 250 GENERAL PHARMACY W/O HCPCS: Performed by: STUDENT IN AN ORGANIZED HEALTH CARE EDUCATION/TRAINING PROGRAM

## 2024-06-05 PROCEDURE — 2500000001 HC RX 250 WO HCPCS SELF ADMINISTERED DRUGS (ALT 637 FOR MEDICARE OP): Performed by: INTERNAL MEDICINE

## 2024-06-05 PROCEDURE — 2500000001 HC RX 250 WO HCPCS SELF ADMINISTERED DRUGS (ALT 637 FOR MEDICARE OP): Performed by: FAMILY MEDICINE

## 2024-06-05 PROCEDURE — 2500000001 HC RX 250 WO HCPCS SELF ADMINISTERED DRUGS (ALT 637 FOR MEDICARE OP): Performed by: PHYSICIAN ASSISTANT

## 2024-06-05 PROCEDURE — 36415 COLL VENOUS BLD VENIPUNCTURE: CPT | Performed by: PHYSICIAN ASSISTANT

## 2024-06-05 PROCEDURE — 2500000004 HC RX 250 GENERAL PHARMACY W/ HCPCS (ALT 636 FOR OP/ED): Performed by: FAMILY MEDICINE

## 2024-06-05 PROCEDURE — 99232 SBSQ HOSP IP/OBS MODERATE 35: CPT | Performed by: FAMILY MEDICINE

## 2024-06-05 PROCEDURE — 80048 BASIC METABOLIC PNL TOTAL CA: CPT | Performed by: PHYSICIAN ASSISTANT

## 2024-06-05 PROCEDURE — 2500000002 HC RX 250 W HCPCS SELF ADMINISTERED DRUGS (ALT 637 FOR MEDICARE OP, ALT 636 FOR OP/ED): Mod: MUE | Performed by: STUDENT IN AN ORGANIZED HEALTH CARE EDUCATION/TRAINING PROGRAM

## 2024-06-05 PROCEDURE — 2500000004 HC RX 250 GENERAL PHARMACY W/ HCPCS (ALT 636 FOR OP/ED): Performed by: STUDENT IN AN ORGANIZED HEALTH CARE EDUCATION/TRAINING PROGRAM

## 2024-06-05 PROCEDURE — 83735 ASSAY OF MAGNESIUM: CPT | Performed by: PHYSICIAN ASSISTANT

## 2024-06-05 PROCEDURE — 94760 N-INVAS EAR/PLS OXIMETRY 1: CPT

## 2024-06-05 PROCEDURE — 85027 COMPLETE CBC AUTOMATED: CPT | Performed by: PHYSICIAN ASSISTANT

## 2024-06-05 PROCEDURE — 2500000002 HC RX 250 W HCPCS SELF ADMINISTERED DRUGS (ALT 637 FOR MEDICARE OP, ALT 636 FOR OP/ED): Performed by: FAMILY MEDICINE

## 2024-06-05 PROCEDURE — 1200000002 HC GENERAL ROOM WITH TELEMETRY DAILY

## 2024-06-05 PROCEDURE — 2500000004 HC RX 250 GENERAL PHARMACY W/ HCPCS (ALT 636 FOR OP/ED): Performed by: PHYSICIAN ASSISTANT

## 2024-06-05 PROCEDURE — 94761 N-INVAS EAR/PLS OXIMETRY MLT: CPT

## 2024-06-05 PROCEDURE — 94640 AIRWAY INHALATION TREATMENT: CPT

## 2024-06-05 RX ORDER — PREDNISONE 10 MG/1
TABLET ORAL DAILY
Qty: 30 TABLET | Refills: 0 | Status: SHIPPED | OUTPATIENT
Start: 2024-06-05 | End: 2024-06-07 | Stop reason: HOSPADM

## 2024-06-05 RX ORDER — GABAPENTIN 300 MG/1
900 CAPSULE ORAL 2 TIMES DAILY
Status: DISCONTINUED | OUTPATIENT
Start: 2024-06-05 | End: 2024-06-07 | Stop reason: HOSPADM

## 2024-06-05 RX ORDER — ENOXAPARIN SODIUM 100 MG/ML
40 INJECTION SUBCUTANEOUS EVERY 24 HOURS
Status: DISCONTINUED | OUTPATIENT
Start: 2024-06-05 | End: 2024-06-07 | Stop reason: HOSPADM

## 2024-06-05 RX ORDER — ACETAMINOPHEN AND CODEINE PHOSPHATE 300; 30 MG/1; MG/1
1 TABLET ORAL EVERY 6 HOURS PRN
Status: DISCONTINUED | OUTPATIENT
Start: 2024-06-05 | End: 2024-06-07 | Stop reason: HOSPADM

## 2024-06-05 RX ORDER — TIZANIDINE 4 MG/1
8 TABLET ORAL NIGHTLY PRN
Status: DISCONTINUED | OUTPATIENT
Start: 2024-06-05 | End: 2024-06-07 | Stop reason: HOSPADM

## 2024-06-05 RX ADMIN — ROPINIROLE HYDROCHLORIDE 1 MG: 1 TABLET, FILM COATED ORAL at 21:02

## 2024-06-05 RX ADMIN — LORAZEPAM 1 MG: 1 TABLET ORAL at 21:02

## 2024-06-05 RX ADMIN — LEVOTHYROXINE SODIUM 25 MCG: 25 TABLET ORAL at 06:03

## 2024-06-05 RX ADMIN — DOCUSATE SODIUM 100 MG: 100 CAPSULE, LIQUID FILLED ORAL at 09:31

## 2024-06-05 RX ADMIN — CEFTRIAXONE SODIUM 2 G: 2 INJECTION, SOLUTION INTRAVENOUS at 15:29

## 2024-06-05 RX ADMIN — ACETAMINOPHEN AND CODEINE PHOSPHATE 1 TABLET: 300; 30 TABLET ORAL at 21:02

## 2024-06-05 RX ADMIN — TIOTROPIUM BROMIDE INHALATION SPRAY 2 PUFF: 3.12 SPRAY, METERED RESPIRATORY (INHALATION) at 09:40

## 2024-06-05 RX ADMIN — ENOXAPARIN SODIUM 40 MG: 40 INJECTION SUBCUTANEOUS at 15:41

## 2024-06-05 RX ADMIN — ONDANSETRON 4 MG: 2 INJECTION INTRAMUSCULAR; INTRAVENOUS at 17:39

## 2024-06-05 RX ADMIN — ONDANSETRON 4 MG: 2 INJECTION INTRAMUSCULAR; INTRAVENOUS at 06:34

## 2024-06-05 RX ADMIN — IPRATROPIUM BROMIDE AND ALBUTEROL SULFATE 3 ML: 2.5; .5 SOLUTION RESPIRATORY (INHALATION) at 14:40

## 2024-06-05 RX ADMIN — LORAZEPAM 2 MG: 1 TABLET ORAL at 09:31

## 2024-06-05 RX ADMIN — TIZANIDINE 8 MG: 4 TABLET ORAL at 21:02

## 2024-06-05 RX ADMIN — FOLIC ACID 1 MG: 1 TABLET ORAL at 09:31

## 2024-06-05 RX ADMIN — PREDNISONE 40 MG: 20 TABLET ORAL at 09:31

## 2024-06-05 RX ADMIN — GABAPENTIN 900 MG: 300 CAPSULE ORAL at 21:01

## 2024-06-05 RX ADMIN — IPRATROPIUM BROMIDE AND ALBUTEROL SULFATE 3 ML: 2.5; .5 SOLUTION RESPIRATORY (INHALATION) at 08:23

## 2024-06-05 RX ADMIN — AZITHROMYCIN MONOHYDRATE 500 MG: 500 INJECTION, POWDER, LYOPHILIZED, FOR SOLUTION INTRAVENOUS at 16:47

## 2024-06-05 RX ADMIN — ATORVASTATIN CALCIUM 20 MG: 20 TABLET, FILM COATED ORAL at 21:02

## 2024-06-05 RX ADMIN — GABAPENTIN 600 MG: 300 CAPSULE ORAL at 09:31

## 2024-06-05 RX ADMIN — IPRATROPIUM BROMIDE AND ALBUTEROL SULFATE 3 ML: 2.5; .5 SOLUTION RESPIRATORY (INHALATION) at 19:30

## 2024-06-05 RX ADMIN — DULOXETINE HYDROCHLORIDE 60 MG: 60 CAPSULE, DELAYED RELEASE ORAL at 09:31

## 2024-06-05 RX ADMIN — Medication 4 L/MIN: at 08:23

## 2024-06-05 RX ADMIN — ACETAMINOPHEN AND CODEINE PHOSPHATE 1 TABLET: 300; 30 TABLET ORAL at 12:15

## 2024-06-05 ASSESSMENT — COGNITIVE AND FUNCTIONAL STATUS - GENERAL
CLIMB 3 TO 5 STEPS WITH RAILING: A LITTLE
MOBILITY SCORE: 23
MOBILITY SCORE: 23
CLIMB 3 TO 5 STEPS WITH RAILING: A LITTLE
DAILY ACTIVITIY SCORE: 24
DAILY ACTIVITIY SCORE: 24

## 2024-06-05 ASSESSMENT — PAIN SCALES - GENERAL
PAINLEVEL_OUTOF10: 0 - NO PAIN
PAINLEVEL_OUTOF10: 4
PAINLEVEL_OUTOF10: 0 - NO PAIN
PAINLEVEL_OUTOF10: 5 - MODERATE PAIN

## 2024-06-05 ASSESSMENT — PAIN - FUNCTIONAL ASSESSMENT
PAIN_FUNCTIONAL_ASSESSMENT: 0-10

## 2024-06-05 ASSESSMENT — PAIN DESCRIPTION - LOCATION: LOCATION: HEAD

## 2024-06-05 ASSESSMENT — PAIN DESCRIPTION - ORIENTATION: ORIENTATION: LEFT;RIGHT

## 2024-06-05 NOTE — DOCUMENTATION CLARIFICATION NOTE
"    PATIENT:               MARLA JUNG  ACCT #:                  [id number]  MRN:                       [id number]  :                       1955  ADMIT DATE:       6/3/2024 10:48 AM  DISCH DATE:  RESPONDING PROVIDER #:        [id number]          PROVIDER RESPONSE TEXT:    Acute Respiratory Failure as evidenced by increased oxygen demands 2/2 pneumonia    CDI QUERY TEXT:    Clarification    Instruction:    Based on your assessment of the patient and the clinical information, please provide the requested documentation by clicking on the appropriate radio button and enter any additional information if prompted.    Question: Please clarify diagnosis of respiratory failure as    When answering this query, please exercise your independent professional judgment. The fact that a question is being asked, does not imply that any particular answer is desired or expected.    The patient's clinical indicators include:  Clinical Information:  68-year-old female with a past medical history of COPD, anxiety on lorazepam, hydroxyzine presenting to the emergency department today in the setting of endorsed worsening confusion over the last few days.    Documented Diagnosis: Acute respiratory failure with hypoxia    Clinical Indicators and Documentation:  -Vital Signs: RR 20, 99% on 4L on 6/3, 16, 92% on 4L on   -ABG results:  pH 7.42, pCO2 46, pO2 64  -Physical Exam Findings:  -Breath sounds: H&P: mild wheezing, decreased breath sounds at bilateral bases  -Distress: none  -Cyanosis: none  -Oxygen Therapy: on 4L O2 at baseline, up to 8L w/ ambulation  RT noted \"Home O2 eval done. Pt 93% RA at rest on 4L. Patient required 8Lwhile ambulating for spo2 of 92%\"  ED noted \"Pulmonary effort is normal. No respiratory distress. Breath sounds: Wheezing present. \"    Treatment: oxygen NC, nebulizer    Risk Factors:  lung cancer w/ mets to lymph nodes, toxic encephalopathy, COPD, anxiety  Options provided:  -- Acute Respiratory Failure ruled " out after further workup; Chronic respiratory failure w/ hypoxia only  -- Acute Respiratory Failure as evidenced by, Please specify additional information below  -- Other - I will add my own diagnosis  -- Refer to Clinical Documentation Reviewer    Query created by: Marcela Garcia on 6/5/2024 7:22 AM      Electronically signed by:  ISABEL RODRIGUEZ PA-C 6/5/2024 7:34 AM

## 2024-06-05 NOTE — PROGRESS NOTES
06/05/24 1345   Discharge Planning   Living Arrangements Spouse/significant other   Support Systems Spouse/significant other   Assistance Needed Patient is A&Ox3, wears 4L O2 at baseline (through [facility]), is independent with ADL's and uses no DME at home, drives. Patient denies further needs upon discharge   Type of Residence Private residence   Number of Stairs to Enter Residence 0   Number of Stairs Within Residence 12   Do you have animals or pets at home? Yes   Type of Animals or Pets 1 dog   Who is requesting discharge planning? Provider   Home or Post Acute Services None   Patient expects to be discharged to: Home with spouse , denies any [facility] needs, baseline 02 4 L thru [facility]   Does the patient need discharge transport arranged? No

## 2024-06-05 NOTE — PROGRESS NOTES
Fatmata Plummer is a 68 y.o. female on day 1 of admission presenting with Community acquired bacterial pneumonia.      Subjective   Patient reporting she is interested in returning home denies any dyspnea however she remains hypoxic with limited physical activity.       Objective     Last Recorded Vitals  /70 (BP Location: Right arm, Patient Position: Lying)   Pulse (!) 111   Temp 36.8 °C (98.2 °F) (Temporal)   Resp 14   Wt 69.9 kg (154 lb 1.6 oz)   SpO2 95%   Intake/Output last 3 Shifts:    Intake/Output Summary (Last 24 hours) at 6/5/2024 1510  Last data filed at 6/5/2024 1353  Gross per 24 hour   Intake 650 ml   Output 0 ml   Net 650 ml       Admission Weight  Weight: 56.7 kg (125 lb) (06/03/24 1050)    Daily Weight  06/04/24 : 69.9 kg (154 lb 1.6 oz)    Image Results  CT abdomen pelvis w IV contrast  Narrative: Interpreted By:  Shivani Britt and O'Connor Gregory   STUDY:  CT ABDOMEN PELVIS W IV CONTRAST;  6/3/2024 2:12 pm      INDICATION:  Signs/Symptoms:Right sided abdominal pain.      COMPARISON:  PET-CT dated 08/14/2023      ACCESSION NUMBER(S):  [id number]      ORDERING CLINICIAN:  TAYA EDEN      TECHNIQUE:  CT of the abdomen and pelvis was performed.  Standard contiguous  axial images were obtained at 3 mm slice thickness through the  abdomen and pelvis. Coronal and sagittal reconstructions at 3 mm  slice thickness were performed.      75 ml of contrast Omnipaque 350 were administered intravenously  without immediate complication.      FINDINGS:  LOWER CHEST:  Airspace opacities in the visualized right lung base. Please see  dedicated CT of the chest dictated separately.      ABDOMEN:      LIVER:  The liver is normal in size without evidence of focal liver lesions.      BILE DUCTS:  The intrahepatic and extrahepatic ducts are not dilated.      GALLBLADDER:  No calcified stones. No wall thickening.      PANCREAS:  There is a degree of fatty atrophy of the pancreatic parenchyma. No  mass lesions  or pancreatic ductal dilatation.      SPLEEN:  The spleen is normal in size without focal lesions.      ADRENAL GLANDS:  Bilateral adrenal glands appear normal.      KIDNEYS AND URETERS:  The kidneys are normal in size and enhance symmetrically. 1.5 cm  simple cyst arising from the superior pole of the right kidney. No  hydroureteronephrosis or nephroureterolithiasis is identified.      PELVIS:      BLADDER:  The urinary bladder appears normal without abnormal wall thickening.      REPRODUCTIVE ORGANS:  Status post hysterectomy. No pelvic masses.      BOWEL:  The stomach is unremarkable.   The small and large bowel are normal  in caliber and demonstrate no wall thickening. Moderate colonic stool  burden. The appendix is not definitely visualized. There is however  no pericecal stranding or fluid.      VESSELS:  There is no aneurysmal dilatation of the abdominal aorta. There are  moderate atherosclerotic calcifications of the abdominal aorta and  its branches. Stent within the left common iliac artery appears  patent.      PERITONEUM/RETROPERITONEUM/LYMPH NODES:  No ascites or free air, no fluid collection.  No abdominopelvic  lymphadenopathy is present.      BONES AND ABDOMINAL WALL:  No suspicious osseous lesions are identified.  The abdominal wall  soft tissues appear normal.      Impression: 1.  No evidence of acute process in the abdomen or pelvis.  2. Airspace disease in the visualized right lung base. Please see  dedicated CT of the chest dictated separately.      I personally reviewed the images/study and I agree with the findings  as stated by resident physician Dr. Dima Thurston.      MACRO:  None      Signed by: Shivani Britt 6/3/2024 2:55 PM  Dictation workstation:   [id number]  CT angio chest for pulmonary embolism  Narrative: Interpreted By:  Shivani Britt and O'Connor Gregory   STUDY:  CT ANGIO CHEST FOR PULMONARY EMBOLISM;  6/3/2024 2:12 pm      INDICATION:  Signs/Symptoms:sob, hx of lung cancer  s/p resection, confusion.      COMPARISON:  CT chest dated 03/04/2024      ACCESSION NUMBER(S):  [id number]      ORDERING CLINICIAN:  TAYA EDEN      TECHNIQUE:  Helical data acquisition of the chest was obtained after intravenous  administration of 75 ML Omnipaque 350, as per PE protocol. Images  were reformatted in coronal and sagittal planes. Axial and coronal  maximum intensity projection (MIP) images were created and reviewed.      FINDINGS:  POTENTIAL LIMITATIONS OF THE STUDY: None      HEART AND VESSELS:  There are no discrete filling defects within main pulmonary artery  and its branches to suggest acute pulmonary embolism. Main pulmonary  artery and its branches are normal in caliber.      The thoracic aorta normal in course and caliber.There is mild to  moderate scattered atherosclerosis present, including calcified and  noncalcified plaques.Although, the study is not tailored for  evaluation of aorta, there is no definite evidence of acute aortic  pathology.      Mild coronary artery calcifications are seen. Please note,the study  is not optimized for evaluation of coronary arteries.      The cardiac chambers are not enlarged.There are no findings to  suggest right heart strain. There is no pericardial effusion seen.      MEDIASTINUM AND OREN, LOWER NECK AND AXILLA:  The visualized thyroid gland is within normal limits.  New right hilar and subcarinal lymphadenopathy, for example measuring  1.3 cm in short axis in the right hilum and 1.0 cm in short axis in  the subcarinal station. Additionally there is a mildly enlarged 1.1  cm right lower paratracheal lymph node. LUNGS AND AIRWAYS:  The trachea and central airways are patent.      New consolidative and reticular opacities in the right lower lobe,  posterior aspect of the right upper lobe, and to a lesser extent in  the posterior aspect of the right middle lobe. Trace right-sided  pleural effusion. Postsurgical changes are again noted in the  right  lower lobe, with assessment of nodularity along the suture line now  limited due to the adjacent consolidative opacities.      Background of moderate centrilobular and paraseptal emphysematous  change.      UPPER ABDOMEN:  Please see dedicated CT of the abdomen and pelvis, dictated  separately.      CHEST WALL AND OSSEOUS STRUCTURES:  Chest wall is within normal limits.  No acute osseous pathology.There are no suspicious osseous lesions.      Impression: 1. No evidence of acute pulmonary embolism.  2. New right lung airspace disease concerning for pneumonia.  Follow-up imaging post treatment is recommended to exclude underlying  neoplastic recurrence.  3. New mildly enlarged right hilar and mediastinal lymph nodes which  may be reactive due to the above.  4. Moderate emphysema.      I personally reviewed the images/study and I agree with the findings  as stated by resident physician Dr. Dima Thurston.      MACRO:  None      Signed by: Shivani Britt 6/3/2024 2:52 PM  Dictation workstation:   [id number]  CT head wo IV contrast  Narrative: Interpreted By:  Shivani Britt and O'Connor Gregory   STUDY:  CT HEAD WO IV CONTRAST;  6/3/2024 2:12 pm      INDICATION:  Signs/Symptoms:confusion. History of lung cancer.      COMPARISON:  MRI brain dated 11/28/2023      ACCESSION NUMBER(S):  [id number]      ORDERING CLINICIAN:  TAYA EDEN      TECHNIQUE:  Noncontrast axial CT scan of head was performed. Angled reformats in  brain and bone windows were generated. The images were reviewed in  bone, brain, blood and soft tissue windows.      FINDINGS:  CSF Spaces: Slight disproportionate prominence of the frontal sulci  bilaterally. The ventricles, sulci and basal cisterns are otherwise  within normal limits. There is no extraaxial fluid collection.      Parenchyma: Periventricular white matter hypodensities likely due to  small vessel ischemic disease. The grey-white differentiation is  intact. There is no mass effect  or midline shift.  There is no  intracranial hemorrhage.      Calvarium: The calvarium is unremarkable.      Paranasal sinuses and mastoids: Visualized paranasal sinuses and  mastoids are clear.      Impression: No evidence of acute intracranial process.      Slight disproportionate frontal volume loss with prominence of the  sulci bilaterally..      I personally reviewed the images/study and I agree with the findings  as stated by resident physician Dr. Dima Thurston.      MACRO:  None      Signed by: Shivani Britt 6/3/2024 2:38 PM  Dictation workstation:   [id number]  ECG 12 lead  Normal sinus rhythm  Normal ECG  When compared with ECG of 26-APR-2022 18:35,  No significant change was found  XR chest 1 view  Narrative: Interpreted By:  Benjy Reyna,   STUDY:  XR CHEST 1 VIEW;  6/3/2024 11:24 am      INDICATION:  Signs/Symptoms:SOB.      COMPARISON:  10/10/2023.      ACCESSION NUMBER(S):  [id number]      ORDERING CLINICIAN:  TAYA EDEN      FINDINGS:  CARDIOMEDIASTINAL SILHOUETTE:  Aortic calcifications are again seen. Cardiac silhouette is stable  and not significantly enlarged.      LUNGS:  There is increased irregular opacification of the right lung base  which may represent combination of atelectasis and infiltrate. Small  right effusion not excluded. No left lung consolidation is seen. No  pneumothorax.      ABDOMEN:  No remarkable upper abdominal findings.      BONES:  No acute osseous changes.      Impression: 1.  Increased irregular opacification of the right lung base may  represent a combination of atelectasis and infiltrate. Small right  effusion not excluded.              MACRO:  None.      Signed by: Benjy Reyna 6/3/2024 11:48 AM  Dictation workstation:   [id number]      Physical Exam  Gen: NAD  Eyes:  EOM intact  ENT: MMM  Neck: No JVD  Respiratory: decreased BS at bilateral bases, +rhonchi, scattered wheezes  Cardiac: RRR, no murmurs rubs or gallops  Abdomen: soft, NT,  +BS  Extremities: no edema or cyanosis  Neuro: No focal deficits, alert and oriented x 3  Psych:  appropriate mood and behavior       Assessment/Plan      Fatmata Plummer is a 68 y.o. female with medical history of COPD (on 4L/min of oxygen at home) right lower lung adenocarcinoma, found to have positive lymph nodes and recently referred to medical oncology, carotid artery stenosis, CAD, PAD, HLD, hypothyroidism, migraines, CVA/TIA in 2006 without residual deficits, anxiety with night terrors presenting with SOB, cough, weakness, confusion, multiple falls and abdominal pain that began 3 days prior to admission is found to have acute proximal respiratory failure due to pneumonia.    Acute on chronic respiratory failure due to commune acquired pneumonia and COPD exacerbation  Patient is on 4 L/min of oxygen at baseline  Patient stable at rest however oxygen demand increases significantly with exertion often to 90% while on 8 L.  Is reported at 6 L she was between 84 and 87%  Continue antibiotics  Continue prednisone taper  Continue scheduled DuoNebs and as needed albuterol    Acute metabolic/toxic encephalopathy with falls  Likely related to infection  Patient has multiple sedating medications which are likely contributing to confusion  Encephalopathy has resolved  Will restart home medications as she is patient and  reports she is stable on these in the past  Monitor mentation  I advised the patient and family to follow-up with her primary care provider concerning these medications  I also advised on return home patient continues to have signs of confusion and r recurrent falls call PCP or come into the emergency room and medications may need to be adjusted    Constipation  Continue bowel regimen    Hypomagnesia  Improved from admission  Redraw in a.m.    Restless leg syndrome  Continue Requip    Hypothyroidism  Check TSH  Continue Synthroid    DVT prophylaxis  Start Lovenox    Disposition  Home when oxygen demand  improves      Jordin Lopes, DO

## 2024-06-06 LAB
ANION GAP SERPL CALC-SCNC: 12 MMOL/L (ref 10–20)
BUN SERPL-MCNC: 17 MG/DL (ref 6–23)
CALCIUM SERPL-MCNC: 8.9 MG/DL (ref 8.6–10.3)
CHLORIDE SERPL-SCNC: 100 MMOL/L (ref 98–107)
CO2 SERPL-SCNC: 30 MMOL/L (ref 21–32)
CREAT SERPL-MCNC: 0.86 MG/DL (ref 0.5–1.05)
EGFRCR SERPLBLD CKD-EPI 2021: 75 ML/MIN/1.73M*2
ERYTHROCYTE [DISTWIDTH] IN BLOOD BY AUTOMATED COUNT: 12.9 % (ref 11.5–14.5)
GLUCOSE SERPL-MCNC: 95 MG/DL (ref 74–99)
HCT VFR BLD AUTO: 38.6 % (ref 36–46)
HGB BLD-MCNC: 11.6 G/DL (ref 12–16)
MAGNESIUM SERPL-MCNC: 2.55 MG/DL (ref 1.6–2.4)
MCH RBC QN AUTO: 27.8 PG (ref 26–34)
MCHC RBC AUTO-ENTMCNC: 30.1 G/DL (ref 32–36)
MCV RBC AUTO: 92 FL (ref 80–100)
NRBC BLD-RTO: 0 /100 WBCS (ref 0–0)
PLATELET # BLD AUTO: 257 X10*3/UL (ref 150–450)
POTASSIUM SERPL-SCNC: 3.8 MMOL/L (ref 3.5–5.3)
RBC # BLD AUTO: 4.18 X10*6/UL (ref 4–5.2)
SODIUM SERPL-SCNC: 138 MMOL/L (ref 136–145)
WBC # BLD AUTO: 8.7 X10*3/UL (ref 4.4–11.3)

## 2024-06-06 PROCEDURE — 36415 COLL VENOUS BLD VENIPUNCTURE: CPT | Performed by: PHYSICIAN ASSISTANT

## 2024-06-06 PROCEDURE — 2500000001 HC RX 250 WO HCPCS SELF ADMINISTERED DRUGS (ALT 637 FOR MEDICARE OP): Performed by: FAMILY MEDICINE

## 2024-06-06 PROCEDURE — 94760 N-INVAS EAR/PLS OXIMETRY 1: CPT | Mod: MUE

## 2024-06-06 PROCEDURE — 2500000004 HC RX 250 GENERAL PHARMACY W/ HCPCS (ALT 636 FOR OP/ED): Performed by: PHYSICIAN ASSISTANT

## 2024-06-06 PROCEDURE — 9420000001 HC RT PATIENT EDUCATION 5 MIN

## 2024-06-06 PROCEDURE — 2500000005 HC RX 250 GENERAL PHARMACY W/O HCPCS: Performed by: PHYSICIAN ASSISTANT

## 2024-06-06 PROCEDURE — 2500000002 HC RX 250 W HCPCS SELF ADMINISTERED DRUGS (ALT 637 FOR MEDICARE OP, ALT 636 FOR OP/ED): Mod: MUE | Performed by: FAMILY MEDICINE

## 2024-06-06 PROCEDURE — 2500000004 HC RX 250 GENERAL PHARMACY W/ HCPCS (ALT 636 FOR OP/ED): Performed by: STUDENT IN AN ORGANIZED HEALTH CARE EDUCATION/TRAINING PROGRAM

## 2024-06-06 PROCEDURE — 2500000002 HC RX 250 W HCPCS SELF ADMINISTERED DRUGS (ALT 637 FOR MEDICARE OP, ALT 636 FOR OP/ED): Performed by: STUDENT IN AN ORGANIZED HEALTH CARE EDUCATION/TRAINING PROGRAM

## 2024-06-06 PROCEDURE — 99223 1ST HOSP IP/OBS HIGH 75: CPT | Performed by: INTERNAL MEDICINE

## 2024-06-06 PROCEDURE — 2500000001 HC RX 250 WO HCPCS SELF ADMINISTERED DRUGS (ALT 637 FOR MEDICARE OP): Performed by: INTERNAL MEDICINE

## 2024-06-06 PROCEDURE — 2500000004 HC RX 250 GENERAL PHARMACY W/ HCPCS (ALT 636 FOR OP/ED): Performed by: FAMILY MEDICINE

## 2024-06-06 PROCEDURE — 94640 AIRWAY INHALATION TREATMENT: CPT | Mod: MUE

## 2024-06-06 PROCEDURE — 80048 BASIC METABOLIC PNL TOTAL CA: CPT | Performed by: PHYSICIAN ASSISTANT

## 2024-06-06 PROCEDURE — 83735 ASSAY OF MAGNESIUM: CPT | Performed by: PHYSICIAN ASSISTANT

## 2024-06-06 PROCEDURE — 2500000001 HC RX 250 WO HCPCS SELF ADMINISTERED DRUGS (ALT 637 FOR MEDICARE OP): Performed by: PHYSICIAN ASSISTANT

## 2024-06-06 PROCEDURE — 85027 COMPLETE CBC AUTOMATED: CPT | Performed by: PHYSICIAN ASSISTANT

## 2024-06-06 PROCEDURE — 99233 SBSQ HOSP IP/OBS HIGH 50: CPT | Performed by: FAMILY MEDICINE

## 2024-06-06 PROCEDURE — 2500000005 HC RX 250 GENERAL PHARMACY W/O HCPCS: Performed by: STUDENT IN AN ORGANIZED HEALTH CARE EDUCATION/TRAINING PROGRAM

## 2024-06-06 PROCEDURE — 1200000002 HC GENERAL ROOM WITH TELEMETRY DAILY

## 2024-06-06 RX ORDER — CEFTRIAXONE 1 G/50ML
1 INJECTION, SOLUTION INTRAVENOUS EVERY 24 HOURS
Status: DISCONTINUED | OUTPATIENT
Start: 2024-06-07 | End: 2024-06-07 | Stop reason: HOSPADM

## 2024-06-06 RX ORDER — PREDNISONE 20 MG/1
40 TABLET ORAL DAILY
Status: COMPLETED | OUTPATIENT
Start: 2024-06-07 | End: 2024-06-07

## 2024-06-06 RX ADMIN — LORAZEPAM 2 MG: 1 TABLET ORAL at 08:59

## 2024-06-06 RX ADMIN — IPRATROPIUM BROMIDE AND ALBUTEROL SULFATE 3 ML: 2.5; .5 SOLUTION RESPIRATORY (INHALATION) at 15:14

## 2024-06-06 RX ADMIN — IPRATROPIUM BROMIDE AND ALBUTEROL SULFATE 3 ML: 2.5; .5 SOLUTION RESPIRATORY (INHALATION) at 19:49

## 2024-06-06 RX ADMIN — FLUTICASONE FUROATE AND VILANTEROL TRIFENATATE 1 PUFF: 200; 25 POWDER RESPIRATORY (INHALATION) at 09:00

## 2024-06-06 RX ADMIN — ATORVASTATIN CALCIUM 20 MG: 20 TABLET, FILM COATED ORAL at 20:35

## 2024-06-06 RX ADMIN — AZITHROMYCIN MONOHYDRATE 500 MG: 500 INJECTION, POWDER, LYOPHILIZED, FOR SOLUTION INTRAVENOUS at 15:17

## 2024-06-06 RX ADMIN — IPRATROPIUM BROMIDE AND ALBUTEROL SULFATE 3 ML: 2.5; .5 SOLUTION RESPIRATORY (INHALATION) at 08:44

## 2024-06-06 RX ADMIN — CEFTRIAXONE SODIUM 2 G: 2 INJECTION, POWDER, FOR SOLUTION INTRAMUSCULAR; INTRAVENOUS at 14:37

## 2024-06-06 RX ADMIN — DULOXETINE HYDROCHLORIDE 60 MG: 60 CAPSULE, DELAYED RELEASE ORAL at 08:59

## 2024-06-06 RX ADMIN — Medication 4 L/MIN: at 08:45

## 2024-06-06 RX ADMIN — DOCUSATE SODIUM 100 MG: 100 CAPSULE, LIQUID FILLED ORAL at 20:35

## 2024-06-06 RX ADMIN — ONDANSETRON 4 MG: 2 INJECTION INTRAMUSCULAR; INTRAVENOUS at 20:34

## 2024-06-06 RX ADMIN — PREDNISONE 30 MG: 5 TABLET ORAL at 09:00

## 2024-06-06 RX ADMIN — Medication 4 L/MIN: at 20:00

## 2024-06-06 RX ADMIN — GABAPENTIN 900 MG: 300 CAPSULE ORAL at 20:34

## 2024-06-06 RX ADMIN — TIZANIDINE 8 MG: 4 TABLET ORAL at 20:47

## 2024-06-06 RX ADMIN — ROPINIROLE HYDROCHLORIDE 1 MG: 1 TABLET, FILM COATED ORAL at 20:35

## 2024-06-06 RX ADMIN — ACETAMINOPHEN AND CODEINE PHOSPHATE 1 TABLET: 300; 30 TABLET ORAL at 14:33

## 2024-06-06 RX ADMIN — ONDANSETRON 4 MG: 2 INJECTION INTRAMUSCULAR; INTRAVENOUS at 06:19

## 2024-06-06 RX ADMIN — LORAZEPAM 1 MG: 1 TABLET ORAL at 20:35

## 2024-06-06 RX ADMIN — GABAPENTIN 900 MG: 300 CAPSULE ORAL at 08:59

## 2024-06-06 RX ADMIN — ENOXAPARIN SODIUM 40 MG: 40 INJECTION SUBCUTANEOUS at 15:17

## 2024-06-06 RX ADMIN — FOLIC ACID 1 MG: 1 TABLET ORAL at 08:59

## 2024-06-06 RX ADMIN — LEVOTHYROXINE SODIUM 25 MCG: 25 TABLET ORAL at 06:19

## 2024-06-06 RX ADMIN — DOCUSATE SODIUM 100 MG: 100 CAPSULE, LIQUID FILLED ORAL at 08:59

## 2024-06-06 RX ADMIN — TIOTROPIUM BROMIDE INHALATION SPRAY 2 PUFF: 3.12 SPRAY, METERED RESPIRATORY (INHALATION) at 08:58

## 2024-06-06 ASSESSMENT — PAIN - FUNCTIONAL ASSESSMENT
PAIN_FUNCTIONAL_ASSESSMENT: 0-10

## 2024-06-06 ASSESSMENT — COGNITIVE AND FUNCTIONAL STATUS - GENERAL
MOBILITY SCORE: 23
DAILY ACTIVITIY SCORE: 24
CLIMB 3 TO 5 STEPS WITH RAILING: A LITTLE

## 2024-06-06 ASSESSMENT — PAIN DESCRIPTION - LOCATION: LOCATION: HEAD

## 2024-06-06 ASSESSMENT — PAIN SCALES - GENERAL
PAINLEVEL_OUTOF10: 0 - NO PAIN
PAINLEVEL_OUTOF10: 0 - NO PAIN
PAINLEVEL_OUTOF10: 9
PAINLEVEL_OUTOF10: 3
PAINLEVEL_OUTOF10: 0 - NO PAIN

## 2024-06-06 ASSESSMENT — ENCOUNTER SYMPTOMS
GASTROINTESTINAL NEGATIVE: 1
SHORTNESS OF BREATH: 1
COUGH: 1
FEVER: 1
NEUROLOGICAL NEGATIVE: 1
CARDIOVASCULAR NEGATIVE: 1
CHILLS: 0
PSYCHIATRIC NEGATIVE: 1

## 2024-06-06 ASSESSMENT — PAIN DESCRIPTION - DESCRIPTORS: DESCRIPTORS: OTHER (COMMENT)

## 2024-06-06 NOTE — PROGRESS NOTES
Fatmata Plummer is a 68 y.o. female on day 2 of admission presenting with Community acquired bacterial pneumonia.      Subjective   Doing well.  Walking pulse ox seems to have improved from yesterday however after walking 30 feet her oxygen saturation went down to 87 while on 4 L  She required increased to 6 L to improve oxygen saturation to 90.       Objective     Last Recorded Vitals  /64 (BP Location: Right arm, Patient Position: Lying)   Pulse 110   Temp 37 °C (98.6 °F) (Temporal)   Resp 20   Wt 69.9 kg (154 lb 1.6 oz)   SpO2 90%   Intake/Output last 3 Shifts:    Intake/Output Summary (Last 24 hours) at 6/6/2024 1218  Last data filed at 6/6/2024 1027  Gross per 24 hour   Intake 960 ml   Output --   Net 960 ml       Admission Weight  Weight: 56.7 kg (125 lb) (06/03/24 1050)    Daily Weight  06/04/24 : 69.9 kg (154 lb 1.6 oz)    Image Results  CT abdomen pelvis w IV contrast  Narrative: Interpreted By:  Shivani Britt and O'Connor Gregory   STUDY:  CT ABDOMEN PELVIS W IV CONTRAST;  6/3/2024 2:12 pm      INDICATION:  Signs/Symptoms:Right sided abdominal pain.      COMPARISON:  PET-CT dated 08/14/2023      ACCESSION NUMBER(S):  [id number]      ORDERING CLINICIAN:  TAYA EDEN      TECHNIQUE:  CT of the abdomen and pelvis was performed.  Standard contiguous  axial images were obtained at 3 mm slice thickness through the  abdomen and pelvis. Coronal and sagittal reconstructions at 3 mm  slice thickness were performed.      75 ml of contrast Omnipaque 350 were administered intravenously  without immediate complication.      FINDINGS:  LOWER CHEST:  Airspace opacities in the visualized right lung base. Please see  dedicated CT of the chest dictated separately.      ABDOMEN:      LIVER:  The liver is normal in size without evidence of focal liver lesions.      BILE DUCTS:  The intrahepatic and extrahepatic ducts are not dilated.      GALLBLADDER:  No calcified stones. No wall thickening.      PANCREAS:  There  is a degree of fatty atrophy of the pancreatic parenchyma. No  mass lesions or pancreatic ductal dilatation.      SPLEEN:  The spleen is normal in size without focal lesions.      ADRENAL GLANDS:  Bilateral adrenal glands appear normal.      KIDNEYS AND URETERS:  The kidneys are normal in size and enhance symmetrically. 1.5 cm  simple cyst arising from the superior pole of the right kidney. No  hydroureteronephrosis or nephroureterolithiasis is identified.      PELVIS:      BLADDER:  The urinary bladder appears normal without abnormal wall thickening.      REPRODUCTIVE ORGANS:  Status post hysterectomy. No pelvic masses.      BOWEL:  The stomach is unremarkable.   The small and large bowel are normal  in caliber and demonstrate no wall thickening. Moderate colonic stool  burden. The appendix is not definitely visualized. There is however  no pericecal stranding or fluid.      VESSELS:  There is no aneurysmal dilatation of the abdominal aorta. There are  moderate atherosclerotic calcifications of the abdominal aorta and  its branches. Stent within the left common iliac artery appears  patent.      PERITONEUM/RETROPERITONEUM/LYMPH NODES:  No ascites or free air, no fluid collection.  No abdominopelvic  lymphadenopathy is present.      BONES AND ABDOMINAL WALL:  No suspicious osseous lesions are identified.  The abdominal wall  soft tissues appear normal.      Impression: 1.  No evidence of acute process in the abdomen or pelvis.  2. Airspace disease in the visualized right lung base. Please see  dedicated CT of the chest dictated separately.      I personally reviewed the images/study and I agree with the findings  as stated by resident physician Dr. Dima Thurston.      MACRO:  None      Signed by: Shivani Britt 6/3/2024 2:55 PM  Dictation workstation:   [id number]  CT angio chest for pulmonary embolism  Narrative: Interpreted By:  Shivani Britt and O'Connor Gregory   STUDY:  CT ANGIO CHEST FOR PULMONARY  EMBOLISM;  6/3/2024 2:12 pm      INDICATION:  Signs/Symptoms:sob, hx of lung cancer s/p resection, confusion.      COMPARISON:  CT chest dated 03/04/2024      ACCESSION NUMBER(S):  [id number]      ORDERING CLINICIAN:  TAYA EDEN      TECHNIQUE:  Helical data acquisition of the chest was obtained after intravenous  administration of 75 ML Omnipaque 350, as per PE protocol. Images  were reformatted in coronal and sagittal planes. Axial and coronal  maximum intensity projection (MIP) images were created and reviewed.      FINDINGS:  POTENTIAL LIMITATIONS OF THE STUDY: None      HEART AND VESSELS:  There are no discrete filling defects within main pulmonary artery  and its branches to suggest acute pulmonary embolism. Main pulmonary  artery and its branches are normal in caliber.      The thoracic aorta normal in course and caliber.There is mild to  moderate scattered atherosclerosis present, including calcified and  noncalcified plaques.Although, the study is not tailored for  evaluation of aorta, there is no definite evidence of acute aortic  pathology.      Mild coronary artery calcifications are seen. Please note,the study  is not optimized for evaluation of coronary arteries.      The cardiac chambers are not enlarged.There are no findings to  suggest right heart strain. There is no pericardial effusion seen.      MEDIASTINUM AND OREN, LOWER NECK AND AXILLA:  The visualized thyroid gland is within normal limits.  New right hilar and subcarinal lymphadenopathy, for example measuring  1.3 cm in short axis in the right hilum and 1.0 cm in short axis in  the subcarinal station. Additionally there is a mildly enlarged 1.1  cm right lower paratracheal lymph node. LUNGS AND AIRWAYS:  The trachea and central airways are patent.      New consolidative and reticular opacities in the right lower lobe,  posterior aspect of the right upper lobe, and to a lesser extent in  the posterior aspect of the right middle lobe. Trace  right-sided  pleural effusion. Postsurgical changes are again noted in the right  lower lobe, with assessment of nodularity along the suture line now  limited due to the adjacent consolidative opacities.      Background of moderate centrilobular and paraseptal emphysematous  change.      UPPER ABDOMEN:  Please see dedicated CT of the abdomen and pelvis, dictated  separately.      CHEST WALL AND OSSEOUS STRUCTURES:  Chest wall is within normal limits.  No acute osseous pathology.There are no suspicious osseous lesions.      Impression: 1. No evidence of acute pulmonary embolism.  2. New right lung airspace disease concerning for pneumonia.  Follow-up imaging post treatment is recommended to exclude underlying  neoplastic recurrence.  3. New mildly enlarged right hilar and mediastinal lymph nodes which  may be reactive due to the above.  4. Moderate emphysema.      I personally reviewed the images/study and I agree with the findings  as stated by resident physician Dr. Dima Thurston.      MACRO:  None      Signed by: Shivani Britt 6/3/2024 2:52 PM  Dictation workstation:   [id number]  CT head wo IV contrast  Narrative: Interpreted By:  Shivani Britt and O'Connor Gregory   STUDY:  CT HEAD WO IV CONTRAST;  6/3/2024 2:12 pm      INDICATION:  Signs/Symptoms:confusion. History of lung cancer.      COMPARISON:  MRI brain dated 11/28/2023      ACCESSION NUMBER(S):  [id number]      ORDERING CLINICIAN:  TAYA EDEN      TECHNIQUE:  Noncontrast axial CT scan of head was performed. Angled reformats in  brain and bone windows were generated. The images were reviewed in  bone, brain, blood and soft tissue windows.      FINDINGS:  CSF Spaces: Slight disproportionate prominence of the frontal sulci  bilaterally. The ventricles, sulci and basal cisterns are otherwise  within normal limits. There is no extraaxial fluid collection.      Parenchyma: Periventricular white matter hypodensities likely due to  small vessel ischemic  disease. The grey-white differentiation is  intact. There is no mass effect or midline shift.  There is no  intracranial hemorrhage.      Calvarium: The calvarium is unremarkable.      Paranasal sinuses and mastoids: Visualized paranasal sinuses and  mastoids are clear.      Impression: No evidence of acute intracranial process.      Slight disproportionate frontal volume loss with prominence of the  sulci bilaterally..      I personally reviewed the images/study and I agree with the findings  as stated by resident physician Dr. Dima Thurston.      MACRO:  None      Signed by: Shivani Britt 6/3/2024 2:38 PM  Dictation workstation:   [id number]  ECG 12 lead  Normal sinus rhythm  Normal ECG  When compared with ECG of 26-APR-2022 18:35,  No significant change was found  XR chest 1 view  Narrative: Interpreted By:  Benjy Reyna,   STUDY:  XR CHEST 1 VIEW;  6/3/2024 11:24 am      INDICATION:  Signs/Symptoms:SOB.      COMPARISON:  10/10/2023.      ACCESSION NUMBER(S):  [id number]      ORDERING CLINICIAN:  TAYA EDEN      FINDINGS:  CARDIOMEDIASTINAL SILHOUETTE:  Aortic calcifications are again seen. Cardiac silhouette is stable  and not significantly enlarged.      LUNGS:  There is increased irregular opacification of the right lung base  which may represent combination of atelectasis and infiltrate. Small  right effusion not excluded. No left lung consolidation is seen. No  pneumothorax.      ABDOMEN:  No remarkable upper abdominal findings.      BONES:  No acute osseous changes.      Impression: 1.  Increased irregular opacification of the right lung base may  represent a combination of atelectasis and infiltrate. Small right  effusion not excluded.              MACRO:  None.      Signed by: Benjy Reyna 6/3/2024 11:48 AM  Dictation workstation:   [id number]      Physical Exam  Gen: NAD  Eyes:  EOM intact  ENT: MMM  Neck: No JVD  Respiratory: decreased BS at bilateral bases, +rhonchi, scattered  wheezes  Cardiac: RRR, no murmurs rubs or gallops  Abdomen: soft, NT, +BS  Extremities: no edema or cyanosis  Neuro: No focal deficits, alert and oriented x 3  Psych:  appropriate mood and behavior       Assessment/Plan      Fatmata Plummer is a 68 y.o. female with medical history of COPD (on 4L/min of oxygen at home) right lower lung adenocarcinoma, found to have positive lymph nodes and recently referred to medical oncology, carotid artery stenosis, CAD, PAD, HLD, hypothyroidism, migraines, CVA/TIA in 2006 without residual deficits, anxiety with night terrors presenting with SOB, cough, weakness, confusion, multiple falls and abdominal pain that began 3 days prior to admission is found to have acute proximal respiratory failure due to pneumonia.    Acute on chronic respiratory failure due to commune acquired pneumonia and COPD exacerbation  Patient is on 4 L/min of oxygen at baseline  At rest patient is stable on 4 L however with ambulation to 30 feet oxygen saturation dropped to 87%  Continue antibiotics  Continue prednisone taper  Continue scheduled DuoNebs and as needed albuterol  Considering continued need for increased supplemental oxygen will consult pulmonology    Acute metabolic/toxic encephalopathy with falls  Likely related to infection  Patient has multiple sedating medications which are likely contributing to confusion  Encephalopathy has resolved  Will restart home medications as she is patient and  reports she is stable on these in the past  I advised the patient and family to follow-up with her primary care provider concerning these medications  I also advised on return home patient continues to have signs of confusion and r recurrent falls call PCP or come into the emergency room and medications may need to be adjusted  Mentation stable overnight with home medications resumed    Constipation  Continue bowel regimen    Hypoermagnesia  Improved from admission  Redraw in a.m.    Restless leg  syndrome  Continue Requip    Hypothyroidism  Check TSH  Continue Synthroid    DVT prophylaxis  Start Lovenox    Disposition  Home when oxygen demand improves      Jordin Lopes, DO

## 2024-06-06 NOTE — NURSING NOTE
Took patient for [facility] to see how she maintained. At the start, she was 93% on 4L just standing. Sarted walking with her on 4L. By about 30 ft, she was dropping, went down to 87% so we stood there to give her time to recover. After a minute or so, she still wasn't coming up so we turned her to 6L. She came up to 90-91% and maintained it all the way back to the room. It took her a minute or two to recover and now she is resting at 94-96% on 4.5L. Through it all, her HR would spike up to the 120s. Dr. Lopes aware.

## 2024-06-07 VITALS
RESPIRATION RATE: 18 BRPM | WEIGHT: 154.1 LBS | HEART RATE: 103 BPM | BODY MASS INDEX: 28.36 KG/M2 | SYSTOLIC BLOOD PRESSURE: 124 MMHG | OXYGEN SATURATION: 90 % | HEIGHT: 62 IN | TEMPERATURE: 97.2 F | DIASTOLIC BLOOD PRESSURE: 73 MMHG

## 2024-06-07 PROCEDURE — 2500000004 HC RX 250 GENERAL PHARMACY W/ HCPCS (ALT 636 FOR OP/ED): Performed by: INTERNAL MEDICINE

## 2024-06-07 PROCEDURE — 2500000002 HC RX 250 W HCPCS SELF ADMINISTERED DRUGS (ALT 637 FOR MEDICARE OP, ALT 636 FOR OP/ED): Performed by: STUDENT IN AN ORGANIZED HEALTH CARE EDUCATION/TRAINING PROGRAM

## 2024-06-07 PROCEDURE — 99238 HOSP IP/OBS DSCHRG MGMT 30/<: CPT | Performed by: FAMILY MEDICINE

## 2024-06-07 PROCEDURE — 2500000001 HC RX 250 WO HCPCS SELF ADMINISTERED DRUGS (ALT 637 FOR MEDICARE OP): Performed by: PHYSICIAN ASSISTANT

## 2024-06-07 PROCEDURE — 94761 N-INVAS EAR/PLS OXIMETRY MLT: CPT

## 2024-06-07 PROCEDURE — 2500000005 HC RX 250 GENERAL PHARMACY W/O HCPCS: Performed by: PHYSICIAN ASSISTANT

## 2024-06-07 PROCEDURE — 94640 AIRWAY INHALATION TREATMENT: CPT

## 2024-06-07 PROCEDURE — 2500000001 HC RX 250 WO HCPCS SELF ADMINISTERED DRUGS (ALT 637 FOR MEDICARE OP): Performed by: INTERNAL MEDICINE

## 2024-06-07 PROCEDURE — 2500000001 HC RX 250 WO HCPCS SELF ADMINISTERED DRUGS (ALT 637 FOR MEDICARE OP): Performed by: FAMILY MEDICINE

## 2024-06-07 PROCEDURE — 2500000004 HC RX 250 GENERAL PHARMACY W/ HCPCS (ALT 636 FOR OP/ED): Performed by: PHYSICIAN ASSISTANT

## 2024-06-07 RX ADMIN — LORAZEPAM 2 MG: 1 TABLET ORAL at 09:24

## 2024-06-07 RX ADMIN — Medication 4 L/MIN: at 08:15

## 2024-06-07 RX ADMIN — GABAPENTIN 900 MG: 300 CAPSULE ORAL at 09:24

## 2024-06-07 RX ADMIN — LEVOTHYROXINE SODIUM 25 MCG: 25 TABLET ORAL at 07:24

## 2024-06-07 RX ADMIN — ONDANSETRON 4 MG: 2 INJECTION INTRAMUSCULAR; INTRAVENOUS at 09:35

## 2024-06-07 RX ADMIN — ACETAMINOPHEN AND CODEINE PHOSPHATE 1 TABLET: 300; 30 TABLET ORAL at 07:25

## 2024-06-07 RX ADMIN — FOLIC ACID 1 MG: 1 TABLET ORAL at 09:24

## 2024-06-07 RX ADMIN — IPRATROPIUM BROMIDE AND ALBUTEROL SULFATE 3 ML: 2.5; .5 SOLUTION RESPIRATORY (INHALATION) at 07:11

## 2024-06-07 RX ADMIN — TIOTROPIUM BROMIDE INHALATION SPRAY 2 PUFF: 3.12 SPRAY, METERED RESPIRATORY (INHALATION) at 09:24

## 2024-06-07 RX ADMIN — DULOXETINE HYDROCHLORIDE 60 MG: 60 CAPSULE, DELAYED RELEASE ORAL at 09:23

## 2024-06-07 RX ADMIN — FLUTICASONE FUROATE AND VILANTEROL TRIFENATATE 1 PUFF: 200; 25 POWDER RESPIRATORY (INHALATION) at 09:24

## 2024-06-07 RX ADMIN — PREDNISONE 40 MG: 20 TABLET ORAL at 09:23

## 2024-06-07 RX ADMIN — DOCUSATE SODIUM 100 MG: 100 CAPSULE, LIQUID FILLED ORAL at 09:23

## 2024-06-07 ASSESSMENT — COGNITIVE AND FUNCTIONAL STATUS - GENERAL
CLIMB 3 TO 5 STEPS WITH RAILING: A LITTLE
DAILY ACTIVITIY SCORE: 24
MOBILITY SCORE: 23

## 2024-06-07 ASSESSMENT — PAIN SCALES - PAIN ASSESSMENT IN ADVANCED DEMENTIA (PAINAD): TOTALSCORE: MEDICATION (SEE MAR);COLD APPLIED

## 2024-06-07 ASSESSMENT — PAIN DESCRIPTION - LOCATION: LOCATION: HEAD

## 2024-06-07 ASSESSMENT — PAIN - FUNCTIONAL ASSESSMENT
PAIN_FUNCTIONAL_ASSESSMENT: 0-10
PAIN_FUNCTIONAL_ASSESSMENT: 0-10

## 2024-06-07 ASSESSMENT — PAIN SCALES - GENERAL: PAINLEVEL_OUTOF10: 5 - MODERATE PAIN

## 2024-06-07 NOTE — CONSULTS
Inpatient consult to Pulmonology  Consult performed by: Min Cedeno MD  Consult ordered by: Jordin Lopes DO  Reason for consult: PNA  Assessment/Recommendations: 67 yo woman w/ h/o CODP on 4L admitted w/ PNA    PNA - on ceftriaxone.  Completed azithro.  Recommend repeat CT-chest as outpatient In 1-2 months for resolution    COPD exacerbation - cont prednisone for 1 more day for 5 day total course.  Cont nebs and inhalers    Chronic hypoxic resp failure - on 4L which is baseline.    Pt should follow-up with Bry Sykes within 1 month    Will sign off at this time.  Please reconsult if there are any further questions.           Reason For Consult  PNA    History Of Present Illness  Fatmata Plummer is a 68 y.o. female presenting with PNA.  Pt w/ h/o COPD on 4L, lung Ca s/p resection.  Pt followed by Bry Sykes.  Pt reports fevers but no chills or cough.  Normally maintained on Trelegy. ET is normally about 50 yards.  CT-showing PNA.    Pt is a former 30 pack year smoker who quit in 2014.  PT worked at [employer].  Has dog.  Was adopted.     Past Medical History  Past Medical History:   Diagnosis Date    Age-related nuclear cataract, bilateral 12/07/2015    Cataract, nuclear sclerotic, both eyes    Age-related nuclear cataract, right eye 04/01/2016    Age-related nuclear cataract of right eye    Aphakia, left eye 01/21/2016    Aphakia of left eye    COPD (chronic obstructive pulmonary disease) (Multi)     Cortical age-related cataract, bilateral 12/07/2015    Cortical age-related cataract of both eyes    Hodgkin lymphoma, unspecified, unspecified site (Multi) 12/04/2013    Hodgkin's disease    Hypermetropia, bilateral 04/01/2016    Hyperopia of both eyes with astigmatism and presbyopia    Lung cancer (Multi) 9/13/2023@ ,    Other chest pain 07/16/2021    Atypical chest pain    Other conditions influencing health status 12/07/2015    PSC (posterior subcapsular cataract), bilateral    Other postherpetic  nervous system involvement 2021    Post herpetic neuralgia    Personal history of other diseases of the respiratory system     History of chronic obstructive lung disease    Personal history of other infectious and parasitic diseases 2021    History of herpes zoster    Personal history of pneumonia (recurrent) 2014    History of pneumonia    Posterior subcapsular polar age-related cataract, right eye 2016    Posterior subcapsular polar age-related cataract of right eye    Presence of intraocular lens 2016    Pseudophakia of left eye    Stroke (Multi)        Surgical History  Past Surgical History:   Procedure Laterality Date    CATARACT EXTRACTION  2016    Cataract Surgery    HYSTERECTOMY  2014    Hysterectomy    LUNG SURGERY  2023    STRABISMUS SURGERY  2015    Strabismus Surgery    TUBAL LIGATION  1984        Social History  Social History     Tobacco Use    Smoking status: Former     Current packs/day: 0.00     Average packs/day: 1 pack/day for 39.6 years (39.6 ttl pk-yrs)     Types: Cigarettes     Start date:      Quit date: 2014     Years since quittin.8     Passive exposure: Never    Smokeless tobacco: Never   Vaping Use    Vaping status: Never Used   Substance Use Topics    Alcohol use: Never    Drug use: Never       Family History  Family History   Adopted: Yes        Allergies  Bupropion, Buspirone, Cilostazol, Latex, Pseudoephedrine, and Nsaids (non-steroidal anti-inflammatory drug)    Review of Systems   Constitutional:  Positive for fever. Negative for chills.   Respiratory:  Positive for cough and shortness of breath.    Cardiovascular: Negative.    Gastrointestinal: Negative.    Neurological: Negative.    Psychiatric/Behavioral: Negative.     All other systems reviewed and are negative.       Physical Exam  Vitals reviewed.   Constitutional:       Appearance: She is ill-appearing.   HENT:      Head: Normocephalic and atraumatic.  "  Eyes:      Extraocular Movements: Extraocular movements intact.   Cardiovascular:      Rate and Rhythm: Normal rate and regular rhythm.      Heart sounds: Normal heart sounds.   Pulmonary:      Effort: Pulmonary effort is normal.      Comments: Coarse breath sounds bilaterally   Abdominal:      Palpations: Abdomen is soft.      Tenderness: There is no abdominal tenderness.   Musculoskeletal:      Cervical back: Normal range of motion.   Skin:     General: Skin is warm.   Neurological:      General: No focal deficit present.      Mental Status: She is alert and oriented to person, place, and time. Mental status is at baseline.   Psychiatric:         Mood and Affect: Mood normal.         Behavior: Behavior normal.          Vital Signs  Blood pressure 145/77, pulse 109, temperature 35.9 °C (96.6 °F), temperature source Temporal, resp. rate 18, height 1.575 m (5' 2\"), weight 69.9 kg (154 lb 1.6 oz), SpO2 95%.  Oxygen Therapy  SpO2: 95 %  Medical Gas Therapy: Supplemental oxygen  O2 Delivery Method: Nasal cannula  FiO2 (%): 36 %    Relevant Results  CT angio chest for pulmonary embolism 06/03/2024    Narrative  Interpreted By:  Shivani Britt and O'Connor Gregory  STUDY:  CT ANGIO CHEST FOR PULMONARY EMBOLISM;  6/3/2024 2:12 pm    INDICATION:  Signs/Symptoms:sob, hx of lung cancer s/p resection, confusion.    COMPARISON:  CT chest dated 03/04/2024    ACCESSION NUMBER(S):  [id number]    ORDERING CLINICIAN:  TAYA EDEN    TECHNIQUE:  Helical data acquisition of the chest was obtained after intravenous  administration of 75 ML Omnipaque 350, as per PE protocol. Images  were reformatted in coronal and sagittal planes. Axial and coronal  maximum intensity projection (MIP) images were created and reviewed.    FINDINGS:  POTENTIAL LIMITATIONS OF THE STUDY: None    HEART AND VESSELS:  There are no discrete filling defects within main pulmonary artery  and its branches to suggest acute pulmonary embolism. Main " pulmonary  artery and its branches are normal in caliber.    The thoracic aorta normal in course and caliber.There is mild to  moderate scattered atherosclerosis present, including calcified and  noncalcified plaques.Although, the study is not tailored for  evaluation of aorta, there is no definite evidence of acute aortic  pathology.    Mild coronary artery calcifications are seen. Please note,the study  is not optimized for evaluation of coronary arteries.    The cardiac chambers are not enlarged.There are no findings to  suggest right heart strain. There is no pericardial effusion seen.    MEDIASTINUM AND OREN, LOWER NECK AND AXILLA:  The visualized thyroid gland is within normal limits.  New right hilar and subcarinal lymphadenopathy, for example measuring  1.3 cm in short axis in the right hilum and 1.0 cm in short axis in  the subcarinal station. Additionally there is a mildly enlarged 1.1  cm right lower paratracheal lymph node. LUNGS AND AIRWAYS:  The trachea and central airways are patent.    New consolidative and reticular opacities in the right lower lobe,  posterior aspect of the right upper lobe, and to a lesser extent in  the posterior aspect of the right middle lobe. Trace right-sided  pleural effusion. Postsurgical changes are again noted in the right  lower lobe, with assessment of nodularity along the suture line now  limited due to the adjacent consolidative opacities.    Background of moderate centrilobular and paraseptal emphysematous  change.    UPPER ABDOMEN:  Please see dedicated CT of the abdomen and pelvis, dictated  separately.    CHEST WALL AND OSSEOUS STRUCTURES:  Chest wall is within normal limits.  No acute osseous pathology.There are no suspicious osseous lesions.    Impression  1. No evidence of acute pulmonary embolism.  2. New right lung airspace disease concerning for pneumonia.  Follow-up imaging post treatment is recommended to exclude underlying  neoplastic recurrence.  3. New  mildly enlarged right hilar and mediastinal lymph nodes which  may be reactive due to the above.  4. Moderate emphysema.    I personally reviewed the images/study and I agree with the findings  as stated by resident physician Dr. Dima Thurston.    MACRO:  None    Signed by: Shivani Britt 6/3/2024 2:52 PM  Dictation workstation:   [id number]    Scheduled medications  atorvastatin, 20 mg, oral, Nightly  [START ON 6/7/2024] cefTRIAXone, 1 g, intravenous, q24h  docusate sodium, 100 mg, oral, BID  DULoxetine, 60 mg, oral, q24h  enoxaparin, 40 mg, subcutaneous, q24h  tiotropium, 2 puff, inhalation, Daily   And  fluticasone furoate-vilanteroL, 1 puff, inhalation, Daily  folic acid, 1 mg, oral, q AM  gabapentin, 900 mg, oral, BID  ipratropium-albuteroL, 3 mL, nebulization, TID  levothyroxine, 25 mcg, oral, Daily before breakfast  LORazepam, 1 mg, oral, Nightly  LORazepam, 2 mg, oral, Daily  oxygen, , inhalation, Continuous - Inhalation  polyethylene glycol, 17 g, oral, Daily  [START ON 6/7/2024] predniSONE, 40 mg, oral, Daily  rOPINIRole, 1 mg, oral, Nightly      Continuous medications     PRN medications  PRN medications: acetaminophen, acetaminophen-codeine, albuterol, benzonatate, ondansetron, oxygen, tiZANidine    Results for orders placed or performed during the hospital encounter of 06/03/24 (from the past 24 hour(s))   CBC   Result Value Ref Range    WBC 8.7 4.4 - 11.3 x10*3/uL    nRBC 0.0 0.0 - 0.0 /100 WBCs    RBC 4.18 4.00 - 5.20 x10*6/uL    Hemoglobin 11.6 (L) 12.0 - 16.0 g/dL    Hematocrit 38.6 36.0 - 46.0 %    MCV 92 80 - 100 fL    MCH 27.8 26.0 - 34.0 pg    MCHC 30.1 (L) 32.0 - 36.0 g/dL    RDW 12.9 11.5 - 14.5 %    Platelets 257 150 - 450 x10*3/uL   Basic metabolic panel   Result Value Ref Range    Glucose 95 74 - 99 mg/dL    Sodium 138 136 - 145 mmol/L    Potassium 3.8 3.5 - 5.3 mmol/L    Chloride 100 98 - 107 mmol/L    Bicarbonate 30 21 - 32 mmol/L    Anion Gap 12 10 - 20 mmol/L    Urea Nitrogen 17 6 - 23  mg/dL    Creatinine 0.86 0.50 - 1.05 mg/dL    eGFR 75 >60 mL/min/1.73m*2    Calcium 8.9 8.6 - 10.3 mg/dL   Magnesium   Result Value Ref Range    Magnesium 2.55 (H) 1.60 - 2.40 mg/dL         Assessment/Plan     69 yo woman w/ h/o CODP on 4L admitted w/ PNA    PNA - on ceftriaxone.  Completed azithro.  Recommend repeat CT-chest as outpatient In 1-2 months for resolution    COPD exacerbation - cont prednisone for 1 more day for 5 day total course.  Cont nebs and inhalers    Chronic hypoxic resp failure - on 4L which is baseline.    Pt should follow-up with Bry Sykes within 1 month    Will sign off at this time.  Please reconsult if there are any further questions.     Min Cedeno MD

## 2024-06-07 NOTE — CARE PLAN
The patient's goals for the shift include      The clinical goals for the shift include Pt will maintain O2 sats while ambulating    Over the shift, the patient did make progress toward the following goal of maintaining sats while ambulating. Preparing to discharge.

## 2024-06-07 NOTE — DISCHARGE SUMMARY
Discharge Diagnosis  Acute on chronic respiratory failure due to commune acquired pneumonia and COPD exacerbation, Acute metabolic encephalopathy, constipation, hypermagnesemia     Issues Requiring Follow-Up  Follow-up with pulmonology for repeat CAT scan in 1 to 2 months to ensure resolution of consolidation    Discharge Meds     Your medication list        START taking these medications        Instructions Last Dose Given Next Dose Due   amoxicillin-pot clavulanate 875-125 mg tablet  Commonly known as: Augmentin      Take 1 tablet by mouth 2 times a day for 5 days. Do not fill before June 5, 2024.       benzonatate 100 mg capsule  Commonly known as: Tessalon      Take 1 capsule (100 mg) by mouth 3 times a day as needed for cough. Do not crush or chew.              CONTINUE taking these medications        Instructions Last Dose Given Next Dose Due   acetaminophen-codeine 300-30 mg tablet  Commonly known as: Tylenol w/ Codeine #3           albuterol 2.5 mg /3 mL (0.083 %) nebulizer solution           atorvastatin 20 mg tablet  Commonly known as: Lipitor           BLACK COHOSH ORAL           cyanocobalamin 1,000 mcg/mL injection  Commonly known as: Vitamin B-12      INJECT 1 ML INTRAMUSCULARLY ONCE EVERY MONTH.       diphenhydrAMINE-acetaminophen  mg per tablet  Commonly known as: Tylenol PM           DULoxetine 60 mg DR capsule  Commonly known as: Cymbalta           folic acid 400 mcg tablet  Commonly known as: Folvite           gabapentin 300 mg capsule  Commonly known as: Neurontin           hydrOXYzine HCL 25 mg tablet  Commonly known as: Atarax           levothyroxine 25 mcg tablet  Commonly known as: Synthroid, Levoxyl           LORazepam 1 mg tablet  Commonly known as: Ativan           LORazepam 1 mg tablet  Commonly known as: Ativan           melatonin 10 mg tablet           rOPINIRole 1 mg tablet  Commonly known as: Requip           tiZANidine 4 mg tablet  Commonly known as: Zanaflex           Trelegy  Ellipta 200-62.5-25 mcg blister with device  Generic drug: fluticasone-umeclidin-vilanter      Inhale 1 puff once daily. Rinse mouth with water after use to reduce aftertaste and incidence of candidiasis. Do not swallow.                 Where to Get Your Medications        These medications were sent to [facility]/pharmacy #[id number] - [place], [place] - [address]  [address] [facility]  [address]  [address], [facility] [address]      Phone: [phone]   amoxicillin-pot clavulanate 875-125 mg tablet  benzonatate 100 mg capsule         Test Results Pending At Discharge  Pending Labs       Order Current Status    Blood Culture Preliminary result    Blood Culture Preliminary result            Hospital Course   Fatmata Plummer is a 68 y.o. female with medical history of COPD (on 4L/min of oxygen at home) right lower lung adenocarcinoma, found to have positive lymph nodes and recently referred to medical oncology, carotid artery stenosis, CAD, PAD, HLD, hypothyroidism, migraines, CVA/TIA in 2006 without residual deficits, anxiety with night terrors presenting with SOB, cough, weakness, confusion, multiple falls and abdominal pain that began 3 days prior to admission is found to have acute hypoxic respiratory failure due to pneumonia.   Patient was treated for pneumonia and COPD exacerbation.  She is weaned down to her baseline oxygen demand 4 L a minute at rest however with ambulation she was found to need 8 L.  As she improved she was reevaluated and found to need 6 L with ambulation.  We were able to get an oxygen concentrator capable of supplying oxygen at 8 L a minute prior to discharge.  Pulmonology was consulted and advised to continue prednisone for total of 5 days which she completed in the hospital and to follow-up as an outpatient to repeat a CAT scan to ensure resolution of the consolidation.  On admission the patient was encephalopathic.  She was on 5 sedate of medications these initially were altered  and the patient's mentation improved.  After further discussion with the patient and  she has been on these medications for a long time with no significant adverse effects.  The medications were restarted as she takes them at home and the encephalopathy did not return.  The patient and family were educated that some of his medications are on the beers list meaning they need to be monitored when given to the elderly.  They are further advised if she continues to be confused to hold a dose of Ativan or Atarax and call their primary care provider.  Admission she was found to have hypomagnesia which improved during hospitalization.      Pertinent Physical Exam At Time of Discharge  Physical Exam  Gen: NAD  Eyes:  EOM intact  ENT: MMM  Neck: No JVD  Respiratory: decreased BS at bilateral bases, +rhonchi, scattered wheezes  Cardiac: RRR, no murmurs rubs or gallops  Abdomen: soft, NT, +BS  Extremities: no edema or cyanosis  Neuro: No focal deficits, alert and oriented x 3  Psych:  appropriate mood and behavior  Outpatient Follow-Up  Future Appointments   Date Time Provider Department Center   9/3/2024 10:00 AM ANKIT Bassett-CNP [id number] [place]   9/4/2024  8:00 AM GEA CT 1 [facility] [place] [facility]   9/11/2024  8:00 AM Olivia Escamilla MD [id number] [facility]   10/22/2024 11:15 AM Franky Dee MD [id number] [facility]         Jordin Lopes DO

## 2024-06-07 NOTE — PROGRESS NOTES
06/07/24 1421   Discharge Planning   Living Arrangements Spouse/significant other   Support Systems Spouse/significant other   Assistance Needed Patient is A&Ox3, wears 4L O2 at baseline (through [place]), is independent with ADL's and uses no DME at home, drives. Patient denies further needs upon discharge   Type of Residence Private residence   Number of Stairs to Enter Residence 0   Number of Stairs Within Residence 12   Do you have animals or pets at home? Yes   Type of Animals or Pets 1dog   Who is requesting discharge planning? Provider   Home or Post Acute Services None   Patient expects to be discharged to: home with spouse, denies any [facility] needs, new home 02 eval completed and requires 6 L on exertion and 4 at rest, call to [place] [facility] and confirmed at home concentrator goes to 8 L and portable tanks goes to 6 L, new script from provider provided to Rt to fax to [facility], medically ready for d/c today, spouse bringing 02 tank from home and will transport patient home   Does the patient need discharge transport arranged? No

## 2024-06-08 LAB
BACTERIA BLD CULT: NORMAL
BACTERIA BLD CULT: NORMAL

## 2024-06-09 LAB
ATRIAL RATE: 89 BPM
P AXIS: 73 DEGREES
P OFFSET: 198 MS
P ONSET: 152 MS
PR INTERVAL: 140 MS
Q ONSET: 222 MS
QRS COUNT: 15 BEATS
QRS DURATION: 70 MS
QT INTERVAL: 362 MS
QTC CALCULATION(BAZETT): 440 MS
QTC FREDERICIA: 412 MS
R AXIS: 64 DEGREES
T AXIS: 66 DEGREES
T OFFSET: 403 MS
VENTRICULAR RATE: 89 BPM

## 2024-06-11 NOTE — SIGNIFICANT EVENT
"Follow Up Phone Call    Outgoing phone call    Spoke to: Fatmata Plummer Relationship:self   Phone number: [phone]      Outcome: contacted patient/ family   Chief Complaint   Patient presents with    Shortness of Breath     Wears 4L baseline per pt her SPO2 dropped in the car. 99% on baseline 4L now. Endorses weakness, confusion x days, multiple falls. No thinners. Endorses a clear productive cough.  Family member states she's been confused about \"everything\"          Diagnosis:PNEUMONIA Follow up Call:    How is your breathing? unchanged/at baseline   How is your activity? same/at baseline   How is your cough?  usual amount   Mucus: mucus: usual amount and color   How often Are you using a Quick Relief/ Rescue Inhaler / Nebulizer:   usual amount           Encouraged coughing and deep breathing exercises.  Voiced understanding.  No further questions or concerns.        "

## 2024-07-05 ENCOUNTER — HOSPITAL ENCOUNTER (OUTPATIENT)
Dept: RADIOLOGY | Facility: HOSPITAL | Age: 69
Discharge: HOME | End: 2024-07-05
Payer: MEDICARE

## 2024-07-05 DIAGNOSIS — Z08 ENCOUNTER FOR FOLLOW-UP SURVEILLANCE OF LUNG CANCER: ICD-10-CM

## 2024-07-05 DIAGNOSIS — C34.91: ICD-10-CM

## 2024-07-05 DIAGNOSIS — Z85.118 ENCOUNTER FOR FOLLOW-UP SURVEILLANCE OF LUNG CANCER: ICD-10-CM

## 2024-07-05 PROCEDURE — 71250 CT THORAX DX C-: CPT

## 2024-07-22 ASSESSMENT — ENCOUNTER SYMPTOMS
TREMORS: 0
SINUS PRESSURE: 0
VOICE CHANGE: 0
RHINORRHEA: 0
MYALGIAS: 0
COUGH: 0
BACK PAIN: 0
VOMITING: 0
WHEEZING: 1
EYE REDNESS: 0
JOINT SWELLING: 0
SORE THROAT: 0
EYE ITCHING: 0
ARTHRALGIAS: 0
HEADACHES: 1
NERVOUS/ANXIOUS: 1
ACTIVITY CHANGE: 0
AGITATION: 0
STRIDOR: 0
NAUSEA: 1
SINUS PAIN: 0
FEVER: 0
DIARRHEA: 0
SLEEP DISTURBANCE: 0
PALPITATIONS: 0
TROUBLE SWALLOWING: 0
CHILLS: 0
UNEXPECTED WEIGHT CHANGE: 0

## 2024-07-22 NOTE — PROGRESS NOTES
Patient: Fatmata Plummer    [id number]  : 1955 -- AGE 68 y.o.    Provider: JESUS Bassett     Location [facility] [place] [facility]   Service Date: 2024         Department of Medicine  Division of Pulmonary, Critical Care, and Sleep Medicine     [facility]  Follow Up Visit Note    HISTORY OF PRESENT ILLNESS     2024: PCP: Dr. Brand  Sleep: Randal Garland PA-C  Medical Oncology: Dr. Olivia Escamilla    HISTORY OF PRESENT ILLNESS   Fatmata Plummer is a 68 y.o. female who presents to a [facility] for a follow up visit with concerns of COPD.   I have independently interviewed and examined the patient in the office and reviewed available records.    DATE OF LAST VISIT: 2024    Current History  Since last visit patient had a hospital admission from 6/3/2024 to 2024 for community-acquired pneumonia and respiratory failure.  Prior to discharge she was weaned down to her baseline oxygen demand at 4 L at rest and respiratory therapy found that she required 6 L with ambulation.  Appears she was able to get an oxygen concentrator capable of supplying oxygen at 8 L a minute prior to her discharge home.  Patient was treated with antibiotics  and prednisone.  Most recent CT chest from 2024 shows resolution of previously seen infiltrates.  She continues to follow with medical oncology; has appointment with Dr. Escamilla upcoming on 24.    On today's visit, She reports she has been feeling much better since being discharged home from the hospital.  She denies any increased cough, fever, sweats, chills.  She will experience some mild intermittent wheezing.  Denies any chest pain or palpitations.  She has been wearing 4 L of supplemental oxygen at rest and has decreased her oxygen down to 5 L with ambulation.  Will frequently monitor her SpO2 levels at home.  Typically will be in the high 90s while sitting at rest on 4 L.   States that she is planning on going back to pulmonary rehab; thinks she has about 10 more sessions left.  Unfortunately patient once again admits that she is not taking her Trelegy 200 every day as prescribed.  States that she is only using this maybe 1 day a week.  Patient states that she is not using it correctly due to cost; however, her  who is also sitting next the patient states that it is not a cost issue.  They are able to get the inhaler for $50; patient just decides not to use it every day.  I once again reiterated and stressed that it is of the utmost importance that patient remain compliant on her Trelegy in order to properly maintain and control her COPD.  Patient states that she is aware.   CAT Today: 19      Prior Visits & History   Hospital admission 6/3/24 - 6/7/24 for CAP and respiratory failure  Hospital Course   Fatmata Plummer is a 68 y.o. female with medical history of COPD (on 4L/min of oxygen at home) right lower lung adenocarcinoma, found to have positive lymph nodes and recently referred to medical oncology, carotid artery stenosis, CAD, PAD, HLD, hypothyroidism, migraines, CVA/TIA in 2006 without residual deficits, anxiety with night terrors presenting with SOB, cough, weakness, confusion, multiple falls and abdominal pain that began 3 days prior to admission is found to have acute hypoxic respiratory failure due to pneumonia.   Patient was treated for pneumonia and COPD exacerbation.  She is weaned down to her baseline oxygen demand 4 L a minute at rest however with ambulation she was found to need 8 L.  As she improved she was reevaluated and found to need 6 L with ambulation.  We were able to get an oxygen concentrator capable of supplying oxygen at 8 L a minute prior to discharge.  Pulmonology was consulted and advised to continue prednisone for total of 5 days which she completed in the hospital and to follow-up as an outpatient to repeat a CAT scan to ensure resolution of the  consolidation.  On admission the patient was encephalopathic.  She was on 5 sedate of medications these initially were altered and the patient's mentation improved.  After further discussion with the patient and  she has been on these medications for a long time with no significant adverse effects.  The medications were restarted as she takes them at home and the encephalopathy did not return.  The patient and family were educated that some of his medications are on the beers list meaning they need to be monitored when given to the elderly.  They are further advised if she continues to be confused to hold a dose of Ativan or Atarax and call their primary care provider.  Admission she was found to have hypomagnesia which improved during hospitalization.    03/05/2024: On today's visit, She reports she was able to get her previously prescribed Trelegy 200; states that it is expensive at the moment until she reaches her plan deductible.  Once deductible is reached it we will go down to $40 a month.  Unfortunately she has not been using the Trelegy as prescribed.  Very infrequently will use it (approximately once a week or even less).  States that she will use her albuterol inhaler more often.  I once again provided education to her and  about the importance of remaining on daily maintenance therapy in order to control her severe COPD.  States that any inhaler that she uses will trigger her burning mouth syndrome.  Strongly encouraged her to start using the Trelegy as its prescribed and she should start at least seeing some improvement in her day-to-day breathing with proper use.  She is no longer going to pulmonary rehab but was able to complete about 19-20 sessions.  About a month and a half ago she did come down with a lower respiratory tract infection and was treated with an antibiotic by her PCP; symptoms cleared.  They are requesting that I fill out a form for the illuminating company today in  regards to her chronic oxygen use; forms filled out today. She is seeing Dr. Escamilla for follow up tomorrow. Just had Chest CT completed yesterday. They asked me to read results to them. I did, but also advised they follow up with Dr. Escamilla as planned to review in more detail from an oncology perspective.  CAT today: 28    12/01/2023: On today's visit, She reports that ever since having her lung surgery on 9/13/2023, she feels like her shortness of breath has worsened.  She also is noticing some intermittent wheezing and she is now on 4 L of supplemental oxygen at all times.  At my last visit with her I had switched her over to Breztri from Advair discus; she was able to get the Breztri however she sparingly ever uses it.  Maybe a couple times a month on average per patient.  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  States that her previous Advair discus powdered inhaler did not seem to bother her mouth as bad.  The albuterol nebulizers and inhalers that she has she also uses sparingly due to mouth pain.     Essentially at this time, she has severe to very severe COPD and is not on any treatment at all; all secondary to burning mouth syndrome.  Due to the fact that her previous powdered Advair was less of a painful experience versus an aerosolized Breztri inhaler; my plan is to get her moved over to powdered Trelegy as triple inhaler therapy.  Dosing will only be once a day and patient seems encouraged that she would be able to tolerate this.  Stressed the importance of needing daily maintenance therapy for management of her COPD; especially after undergoing lung surgery and dealing with lung cancer.     Since last visit, patient has been diagnosed with non-small cell lung cancer.  Underwent a right lower lobe wedge resection on 9/13/2023 confirming the diagnosis.  She is now being followed by both thoracic surgery as well as medical oncology; last  "saw Dr. Escamilla on 10/25/2023.  Per Dr. Escamilla's last note she has stage IIIa lung cancer and was advised pulmonary rehab due to some significant post wedge resection debility.  Per Dr. Cho note from 10/10/2023; he is planning on seeing her back in 6 months with a repeat CT of her chest at that time.  According to chart review, it appears patient started pulmonary rehab on 11/20/2023 (previously ordered by Dr. Escamilla).    CAT TODAY: 25    05/8/23: Since last visit on 10/19/22 with Dr. Guerra, patient completed lung cancer screening CT that was previously ordered. Reviewed those results with her today. [facility] Chest CT scan from 4/27/23 showed a 7mm RLL lung nodule, mild to moderate upper lobe emphysema and some mild subpleural reticulations; suggestive sequela of smoking. Lung RADS 4A. Also recently completed an echocardiogram on 12/13/22 which showed preserved EF of 65-70% with LV diastolic dysfunction. Trace tricuspid and mitral regurg. No sign of pulmonary HTN. Last visit with Dr. Guerra, she was prescribed Incruse to pair with her Advair 250. However, appears like she never got the Incruse; states insurance did not cover it. Also is only using her Advair PRN due to \"burning mouth syndrome\". Using it maybe 3x a month currently. Has albuterol nebulizer and inhaler; using those treatments maybe 1-2x a week. She is on 3L of supplemental oxygen constantly. Has been on the oxygen for a little over a year. Wears at night also. She admits to an infrequent cough that is non productive. Admits to frequent wheezing. Denies SOB at rest. Does have BOTELLO; MMRC 3. Denies allergy issues. Denies GERD. Denies chest pain. Denies LLE but does have +orthopnea. Admits to snoring and  has noted abnormal breathing patterns. Has daytime fatigue and will often doze off easily. ESS today was 13.  ACT Today: 15  CAT Today: 28     Medical Hx  -COPD  -Chronic Hypoxic Respiratory Failure  -Hx of Hodgkins Lymphoma (2008) s/p lumpectomy " right axilla and chemo/radiation right axilla (Dr. Brar)  -HLD  -B12 deficiency  -Night Terrors  -Burning Mouth Syndrome    ** The below are notes from previous visits with Dr. 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 has albuterol prn. Did have breathing treatments in the hospital which did not cause this issue      Denies asthma as a kid     Denies waking up at night short of breath      c/o hoarse voice      Last PFT in 2015--mod obstruction      PMH/PSH: subclavian artery stenosis, lymphoma --s/p chemo/rads in 2015,   FH : adopted so unsure of family hx   SH : quit smoking in 2014, was smoking 1 ppd for 30 years. Used to work in a school as an aide. pets at home    REVIEW OF SYSTEMS     REVIEW OF SYSTEMS  Review of Systems   Constitutional:  Negative for activity change, appetite change, chills, fatigue, fever and unexpected weight change.        Claims night sweats   HENT:  Positive for congestion. Negative for postnasal drip, rhinorrhea, sinus pressure, sinus pain, sneezing, sore throat, trouble swallowing and voice change.         Denies throat clearing   Eyes:  Negative for redness and itching.   Respiratory:  Positive for wheezing. Negative for cough, chest tightness, shortness of breath and stridor.    Cardiovascular:   Negative for chest pain, palpitations and leg swelling.        Denies orthopnea   Gastrointestinal:  Positive for nausea. Negative for abdominal pain, diarrhea and vomiting.        + acid reflux   Musculoskeletal:  Negative for arthralgias, back pain, joint swelling and myalgias.   Skin:  Negative for rash.   Allergic/Immunologic: Negative for immunocompromised state.   Neurological:  Positive for dizziness and headaches. Negative for tremors and weakness.   Hematological:  Bruises/bleeds easily.   Psychiatric/Behavioral:  Negative for agitation and sleep disturbance. The patient is nervous/anxious.         Claims depression   All other systems reviewed and are negative.        ALLERGIES AND MEDICATIONS     ALLERGIES  Allergies   Allergen Reactions    Bupropion Other and Unknown    Buspirone Other    Cilostazol Unknown    Latex Other     BLISTER    Pseudoephedrine Other and Unknown    Nsaids (Non-Steroidal Anti-Inflammatory Drug) Palpitations       MEDICATIONS  Current Outpatient Medications   Medication Sig Dispense Refill    acetaminophen-codeine (Tylenol w/ Codeine #3) 300-30 mg tablet Take 1 tablet by mouth every 6 hours if needed.      albuterol 2.5 mg /3 mL (0.083 %) nebulizer solution Take 3 mL (2.5 mg) by nebulization every 4 hours if needed for wheezing.      albuterol 90 mcg/actuation aerosol powdr breath activated inhaler Inhale 2 puffs every 4 hours if needed for wheezing or shortness of breath.      atorvastatin (Lipitor) 20 mg tablet Take 1 tablet (20 mg) by mouth once daily.      benzonatate (Tessalon) 100 mg capsule Take 1 capsule (100 mg) by mouth 3 times a day as needed for cough. Do not crush or chew. 42 capsule 0    BLACK COHOSH ORAL Take 40 mg by mouth once daily in the morning.      cyanocobalamin (Vitamin B-12) 1,000 mcg/mL injection INJECT 1 ML INTRAMUSCULARLY ONCE EVERY MONTH. 3 mL 13    diphenhydrAMINE (BENADryl) 25 mg capsule Take 2 capsules (50 mg) by mouth once daily at bedtime.       diphenhydrAMINE-acetaminophen (Tylenol PM)  mg per tablet Take 2 tablets by mouth as needed at bedtime for sleep.      DULoxetine (Cymbalta) 60 mg DR capsule 1 capsule (60 mg) once every 24 hours.      folic acid (Folvite) 400 mcg tablet Take 2.5 tablets (1 mg) by mouth once daily in the morning.      gabapentin (Neurontin) 300 mg capsule Take 3 capsules (900 mg) by mouth 2 times a day.      hydrOXYzine HCL (Atarax) 25 mg tablet Take 1 tablet (25 mg) by mouth 2 times a day.      levothyroxine (Synthroid, Levoxyl) 25 mcg tablet Take 1 tablet (25 mcg) by mouth once daily in the morning. Take before meals.      LORazepam (Ativan) 1 mg tablet Take 2 tablets (2 mg) by mouth once daily in the morning.      LORazepam (Ativan) 1 mg tablet Take 1 tablet (1 mg) by mouth once daily at bedtime.      oxygen (O2) gas therapy Inhale 4 L/min continuously. ANTONELLA ROTH      promethazine (Phenergan) 25 mg tablet Take 1 tablet (25 mg) by mouth 3 times a day.      rOPINIRole (Requip) 1 mg tablet Take 1 tablet (1 mg) by mouth once daily at bedtime.      tiZANidine (Zanaflex) 4 mg tablet Take 2 tablets (8 mg) by mouth once daily at bedtime.  1 tablet as needed Orally Three times a day      fluticasone-umeclidin-vilanter (Trelegy Ellipta) 200-62.5-25 mcg blister with device Inhale 1 puff once daily. Rinse mouth with water after use to reduce aftertaste and incidence of candidiasis. Do not swallow. 60 each 3     No current facility-administered medications for this visit.       PAST HISTORY     PAST MEDICAL HISTORY  She  has a past medical history of Age-related nuclear cataract, bilateral (12/07/2015), Age-related nuclear cataract, right eye (04/01/2016), Aphakia, left eye (01/21/2016), COPD (chronic obstructive pulmonary disease) (Multi), Cortical age-related cataract, bilateral (12/07/2015), Hodgkin lymphoma, unspecified, unspecified site (Multi) (12/04/2013), Hypermetropia, bilateral (04/01/2016), Lung cancer (Multi) (9/13/2023@  ,), Other chest pain (07/16/2021), Other conditions influencing health status (12/07/2015), Other postherpetic nervous system involvement (07/16/2021), Personal history of other diseases of the respiratory system, Personal history of other infectious and parasitic diseases (07/16/2021), Personal history of pneumonia (recurrent) (08/07/2014), Posterior subcapsular polar age-related cataract, right eye (04/01/2016), Presence of intraocular lens (01/21/2016), and Stroke (Multi) (2000).    PAST SURGICAL HISTORY  Past Surgical History:   Procedure Laterality Date    CATARACT EXTRACTION  02/24/2016    Cataract Surgery    HYSTERECTOMY  09/18/2014    Hysterectomy    LUNG SURGERY Right 09/13/2023    Right lower lobe wedge resection    STRABISMUS SURGERY  12/07/2015    Strabismus Surgery    TUBAL LIGATION  6/1984       IMMUNIZATION HISTORY  Immunization History   Administered Date(s) Administered    Flu vaccine (IIV4), preservative free *Check age/dose* 11/02/2016, 09/25/2018, 10/04/2019, 10/07/2021    Flu vaccine, quadrivalent, high-dose, preservative free, age 65y+ (FLUZONE) 10/05/2022, 09/26/2023    Flu vaccine, quadrivalent, no egg protein, age 6 month or greater (FLUCELVAX) 10/05/2020    Influenza, injectable, quadrivalent 10/20/2017    Influenza, seasonal, injectable 11/01/2012, 10/02/2015    Influenza, seasonal, injectable, preservative free 09/30/2013    Moderna COVID-19 vaccine, bivalent, blue cap/gray label *Check age/dose* 11/09/2022    Moderna SARS-CoV-2 Vaccination 04/20/2021    Novel influenza-H1N1-09, preservative-free 12/22/2009    Pneumococcal conjugate vaccine, 20-valent (PREVNAR 20) 09/26/2023       SOCIAL HISTORY  She  reports that she quit smoking about 10 years ago. Her smoking use included cigarettes. She started smoking about 49 years ago. She has a 39 pack-year smoking history. She has never been exposed to tobacco smoke. She has never used smokeless tobacco. She reports that she does not currently  use alcohol. She reports that she does not currently use drugs.     FAMILY HISTORY  Family History   Adopted: Yes       PHYSICAL EXAM     VITAL SIGNS: There were no vitals taken for this visit.     PREVIOUS WEIGHTS:  Wt Readings from Last 3 Encounters:   24 69.9 kg (154 lb 1.6 oz)   24 67.1 kg (148 lb)   24 66.8 kg (147 lb 4.3 oz)       Physical Exam  Vitals reviewed.   Constitutional:       General: She is not in acute distress.     Appearance: Normal appearance. She is not ill-appearing or toxic-appearing.   HENT:      Head: Normocephalic.      Nose: No rhinorrhea.   Cardiovascular:      Rate and Rhythm: Normal rate and regular rhythm.      Heart sounds: Normal heart sounds.   Pulmonary:      Effort: Pulmonary effort is normal. No respiratory distress.      Breath sounds: Normal breath sounds. No stridor. No wheezing, rhonchi or rales.      Comments: Slightly diminished lung sounds all over; otherwise - clear.  Abdominal:      General: Abdomen is flat.   Musculoskeletal:         General: No swelling. Normal range of motion.      Right lower leg: No edema.      Left lower leg: No edema.   Skin:     General: Skin is warm and dry.      Nails: There is no clubbing.   Neurological:      General: No focal deficit present.      Mental Status: She is alert and oriented to person, place, and time.   Psychiatric:         Mood and Affect: Mood normal.         Behavior: Behavior normal.         Judgment: Judgment normal.       RESULTS/DATA     Pulmonary Function Test Results   PFT  -22: FEV1/FVC: 49, FEV1: 0.91 (41%), FVC: 1.85 (64%), +BD response, RVZ63-93: 15%, TLC: 3.69 (83%), DLCO: 31%  -4/14/15: FEV1/FVC: 49, FEV1: 1.49 (61%), FVC: 3.02 (97%), no BD response, CLA98-89: 17%, T.43 (98%), DLCO: 31%        Chest Radiograph     XR chest 2 views 10/10/2023    Narrative  Interpreted By:  Geoff Franklin,  STUDY:  XR CHEST 2 VIEWS;  10/10/2023 2:07  pm    INDICATION:  Signs/Symptoms:FU.    COMPARISON:  Chest radiograph 09/17/2023    ACCESSION NUMBER(S):  [id number]    ORDERING CLINICIAN:  BROOKE FARNSWORTH    FINDINGS:      CARDIOMEDIASTINAL SILHOUETTE:  Cardiomediastinal silhouette is unchanged.    LUNGS:  No consolidation, pneumothorax, or effusion. Flattened hemidiaphragms  bibasilar pleuroparenchymal thickening suggestive of COPD.    ABDOMEN:  No remarkable upper abdominal findings.    BONES:  No acute osseous changes.    Remaining findings appear stable since prior comparison radiograph.    Impression  1.  No evidence of acute cardiopulmonary process.      Signed by: Geoff Franklin 10/11/2023 8:19 PM  Dictation workstation:   [id number]      Chest CT Scan   Lung CA Screening Chest CT  7/27/23: IMPRESSION: 1. There is a suspicious, growing superior segment right lower lobe lung nodule. Concerning bronchogenic carcinoma. Recommend PET-CT scanning and interventional pulmonology or thoracic surgery consultation. LUNG RADS CATEGORY: Lung-RADS 4X  -4/27/23: 1. There is a 7 mm right lower lobe nodule. Comparison to prior study is limited due to presence of motion and infiltrate on the prior study, however this nodule is likely new compared to prior study. Recommend short-term follow-up chest CT in 3 months. 2. Mild to moderate upper lung predominant emphysema. Subpleural reticulation in the lungs, likely sequela of smoking. 3. Mild coronary artery calcification. LUNG RADS CATEGORY: Lung-RADS 4A    Chest CT  7/5/24: IMPRESSION: Resolution of the interstitial infiltrates and areas of airspace consolidation from the right lung as compared with prior study with resolved mediastinal and right hilar adenopathy. Postoperative change from partial right lower lobe resection with mild cylindrical bronchiectasis in right lower lobe and with linear and discoid areas of scarring and atelectasis in right lower lobe. There is minimal residual right pleural  effusion.  6/3/24: IMPRESSION: 1. No evidence of acute pulmonary embolism. 2. New right lung airspace disease concerning for pneumonia. Follow-up imaging post treatment is recommended to exclude underlying neoplastic recurrence. 3. New mildly enlarged right hilar and mediastinal lymph nodes which may be reactive due to the above. 4. Moderate emphysema.  3/4/24: IMPRESSION: 1.  Scar from wedge resection in the right lower lobe appears unchanged 2. Minimal pleural fluid  on the right 3. No interval developing nodules or infiltrates  11/13/23: IMPRESSION: 1. Status post wedge resection right lower lobe superior segment with postoperative changes present consisting of fibrotic stranding, mild bronchial dilatation in the lower lobe and pleural thickening. 2. Right paratracheal lymphadenopathy for which follow-up is recommended. Size of one right paratracheal lymph node has increased from previous examination. 3. Remaining mediastinal lymph nodes unchanged in size. 4. COPD.    PET CT  8/14/23: IMPRESSION: 1. Hypermetabolic right lower lobe nodule is highly concerning for malignancy. Recommend tissue biopsy for further evaluation. 2. No evidence of hypermetabolic lymphadenopathy or metastatic disease.    Echocardiogram & Cardiac Studies     Echocardiogram     [facility], [address] [place] [address]  Tel [phone] and Fax [phone]    TRANSTHORACIC ECHOCARDIOGRAM REPORT      Patient Name:     MARLA JUNG  Reading Physician:   [id number] Sam Bolton MD  Study Date:       12/13/2022   Referring Physician: Cori Guerra  MRN/PID:          [id number]     PCP:  Accession/Order#: [id number] Department Location: [facility] Non [facility]  YOB: 1955    Fellow:  Gender:           F            Nurse:  Admit Date:                    Sonographer:         Gabrielle Frank [facility]  Admission Status: Outpatient   Additional Staff:  Height:           157.48 cm    CC Report to:         Cori Guerra MD  Weight:           70.31 kg     Study Type:          Echocardiogram  BSA:              1.72 m2  Blood Pressure: 126 /80 mmHg    Diagnosis/ICD: R06.00-Dyspnea, unspecified  Indication:    Dyspnea  Procedure/CPT: Echo Complete w Full [id number]    Patient History:  Smoker:            Former.  Pertinent History: COPD and Dyspnea.    Study Detail: The following Echo studies were performed: 2D, M-Mode, Doppler and  color flow.      PHYSICIAN INTERPRETATION:  Left Ventricle: The left ventricular systolic function is hyperdynamic, with an estimated ejection fraction of 65-70%. There are no regional wall motion abnormalities. The left ventricular cavity size is decreased. Spectral Doppler shows an impaired relaxation pattern of left ventricular diastolic filling.  Left Atrium: The left atrium is normal in size.  Right Ventricle: The right ventricle is normal in size. There is normal right ventricular global systolic function.  Right Atrium: The right atrium is normal in size.  Aortic Valve: The aortic valve was not well visualized. There is no evidence of aortic valve stenosis.  There is no evidence of aortic valve regurgitation. The peak instantaneous gradient of the aortic valve is 11.2 mmHg. The mean gradient of the aortic valve is 5.9 mmHg.  Mitral Valve: The mitral valve is normal in structure. There is trace mitral valve regurgitation.  Tricuspid Valve: The tricuspid valve is structurally normal. There is trace tricuspid regurgitation.  Pulmonic Valve: The pulmonic valve is not well visualized. The pulmonic valve regurgitation was not well visualized.  Pericardium: There is no pericardial effusion noted.  Aorta: The aortic root is normal.  Pulmonary Artery: The tricuspid regurgitant velocity is 2.00 m/s, and with an estimated right atrial pressure of 3 mmHg, the estimated pulmonary artery pressure is normal with the RVSP at 19.0 mmHg.  Systemic Veins: The inferior vena cava size appears  small.      CONCLUSIONS:  1. Left ventricular systolic function is hyperdynamic with a 65-70% estimated ejection fraction.  2. Spectral Doppler shows an impaired relaxation pattern of left ventricular diastolic filling.  3. Left ventricular cavity size is decreased.  4. There is trace mitral and tricuspid regurgitation.    Labwork & Pathology   Complete Blood Count  Lab Results   Component Value Date    WBC 8.7 06/06/2024    HGB 11.6 (L) 06/06/2024    HCT 38.6 06/06/2024    MCV 92 06/06/2024     06/06/2024       Peripheral Eosinophil Count:   Eosinophils Absolute   Date Value   06/03/2024 0.09 x10*3/uL   09/01/2022 0.17 x10E9/L   04/25/2022 0.08 x10E9/L   10/31/2017 0.15 x10E9/L       Pathology  9/13/23: FINAL DIAGNOSIS   A.  RIGHT LUNG, LOWER LOBE, PULMONARY WEDGE RESECTION:   -- INVASIVE ACINAR ADENOCARCINOMA (1.8 CM) INVOLVING LUNG PARENCHYMA   B.  LEVEL 8 LYMPH NODE, EXCISION:  -- ONE (1) LYMPH NODE NEGATIVE FOR TUMOR.  C.  LEVEL 9 LYMPH NODE, EXCISION:  -- ONE (1) LYMPH NODE NEGATIVE FOR TUMOR.  D.  LEVEL 7 LYMPH NODE, EXCISION:  -- METASTATIC CARCINOMA TO FRAGMENTS OF LYMPH NODE(S).  E.  LEVEL 10 LYMPH NODE, EXCISION:  -- FRAGMENTS OF LYMPH NODE(S) NEGATIVE FOR TUMOR.  F.  LEVEL 4R LYMPH NODE, EXCISION:  -- FRAGMENTS OF LYMPH NODE(S) NEGATIVE FOR TUMOR.  Bronchoscopy & Sputum Cultures       ASSESSMENT/PLAN     Ms. Plummer is a 68 y.o. female; was referred to the [facility] for follow up of COPD and hypoxic respiratory failure.    Problem List and Orders  Diagnoses and all orders for this visit:  Chronic obstructive pulmonary disease, unspecified COPD type (Multi)  -     fluticasone-umeclidin-vilanter (Trelegy Ellipta) 200-62.5-25 mcg blister with device; Inhale 1 puff once daily. Rinse mouth with water after use to reduce aftertaste and incidence of candidiasis. Do not swallow.  Chronic respiratory failure with hypoxia (Multi)  Adenocarcinoma of right lung  (Multi)          Assessment and Plan / Recommendations:  1. Severe COPD: Most recent PFT from 8/29/22 showing GOLD 3 Severe COPD with FEV1 of 41%, +BD response and DLCO at 31%. 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 Was also previously prescribed Incruse for triple therapy; but not covered by insurance. Infrequent nonproductive cough with frequent wheezing. Was able to get Trelegy 200 but very infrequently uses it. Provided more education to her today about the importance of compliance with daily maintenance inhaler therapy. Had a hospital admission 6/2024 for CAP and COPD exacerbation.   -Continue Trelegy 200; 1 inhalation once a day.  Rinse mouth after use.   -On 3/5/24 and 7/23/24 visit; admits to seldom ever using the Trelegy (maybe once a week if that). Reeducated her about this inhaler and how/why it needs to be used.  - states they are able to get Trelegy at $50. Cost is not the issue; patient is just being non-compliant with her treatment.  - Continue Albuterol Nebs & HFA PRN (states that the nebulized medications also affect her mouth pretty significantly)  -Completed about 19-20 sessions of pulmonary rehab; thinking about going back     2. Chronic Hypoxic Respiratory Failure: Was admitted for a COPD exacerbation 4/2022 and was discharged on 2L supplemental oxygen.   -Since her lung surgery; she is now on 4 L of supplemental oxygen at all times.  -At hospital admission on 6/3/24; RT evaluated her and found that she requires 4L rest; 6L exertion. Currently has POC capable of going up to 8L.  -Form filled out today (3/5/24) for [employer]     3. NSCLC: Patient completed a lung cancer screening chest CT on 4/27/2023 which showed a 7 mm right lower lobe nodule; surrounding this nodularity appeared GGO; very inflammatory looking. Lung RADS 4A; advising 3-month follow-up. Most recent Chest CT from 3/4/24 shows stable findings without concern of new  nodules.  -Diagnosed with lung cancer on 9/13/2023 after having a right lower lobe wedge resection  -Continue to follow with thoracic surgery  -Continue to follow with medical oncology     4. Suspected BLAKE: Patient admits to frequent snoring and  has noticed abnormal breathing patterns while asleep. Patient admits to significant daytime fatigue and will often doze off easily. ESS today was 13. Has never completed a sleep study.  - Currently established with sleep medicine    RTC 6 months

## 2024-07-23 ENCOUNTER — OFFICE VISIT (OUTPATIENT)
Dept: PULMONOLOGY | Facility: CLINIC | Age: 69
End: 2024-07-23
Payer: MEDICARE

## 2024-07-23 DIAGNOSIS — J96.11 CHRONIC RESPIRATORY FAILURE WITH HYPOXIA (MULTI): ICD-10-CM

## 2024-07-23 DIAGNOSIS — C34.91 ADENOCARCINOMA OF RIGHT LUNG (MULTI): ICD-10-CM

## 2024-07-23 DIAGNOSIS — J44.9 CHRONIC OBSTRUCTIVE PULMONARY DISEASE, UNSPECIFIED COPD TYPE (MULTI): Primary | ICD-10-CM

## 2024-07-23 PROCEDURE — 99214 OFFICE O/P EST MOD 30 MIN: CPT | Performed by: NURSE PRACTITIONER

## 2024-07-23 PROCEDURE — 1160F RVW MEDS BY RX/DR IN RCRD: CPT | Performed by: NURSE PRACTITIONER

## 2024-07-23 PROCEDURE — 1036F TOBACCO NON-USER: CPT | Performed by: NURSE PRACTITIONER

## 2024-07-23 PROCEDURE — 1159F MED LIST DOCD IN RCRD: CPT | Performed by: NURSE PRACTITIONER

## 2024-07-23 RX ORDER — DIPHENHYDRAMINE HCL 25 MG
50 CAPSULE ORAL NIGHTLY
COMMUNITY

## 2024-07-23 RX ORDER — HYDROXYZINE PAMOATE 25 MG/1
1 CAPSULE ORAL
COMMUNITY
Start: 2024-01-16 | End: 2024-07-23 | Stop reason: SDUPTHER

## 2024-07-23 RX ORDER — FLUTICASONE FUROATE, UMECLIDINIUM BROMIDE AND VILANTEROL TRIFENATATE 200; 62.5; 25 UG/1; UG/1; UG/1
1 POWDER RESPIRATORY (INHALATION) DAILY
Qty: 60 EACH | Refills: 3 | Status: SHIPPED | OUTPATIENT
Start: 2024-07-23

## 2024-07-23 RX ORDER — PROMETHAZINE HYDROCHLORIDE 25 MG/1
1 TABLET ORAL
COMMUNITY
Start: 2024-06-21

## 2024-07-23 ASSESSMENT — PATIENT HEALTH QUESTIONNAIRE - PHQ9
SUM OF ALL RESPONSES TO PHQ9 QUESTIONS 1 & 2: 0
1. LITTLE INTEREST OR PLEASURE IN DOING THINGS: SEVERAL DAYS
10. IF YOU CHECKED OFF ANY PROBLEMS, HOW DIFFICULT HAVE THESE PROBLEMS MADE IT FOR YOU TO DO YOUR WORK, TAKE CARE OF THINGS AT HOME, OR GET ALONG WITH OTHER PEOPLE: SOMEWHAT DIFFICULT
2. FEELING DOWN, DEPRESSED OR HOPELESS: NOT AT ALL
2. FEELING DOWN, DEPRESSED OR HOPELESS: SEVERAL DAYS
SUM OF ALL RESPONSES TO PHQ9 QUESTIONS 1 & 2: 2
1. LITTLE INTEREST OR PLEASURE IN DOING THINGS: NOT AT ALL

## 2024-07-23 ASSESSMENT — ENCOUNTER SYMPTOMS
CHEST TIGHTNESS: 0
DIZZINESS: 1
FATIGUE: 0
APPETITE CHANGE: 0
BRUISES/BLEEDS EASILY: 1
ABDOMINAL PAIN: 0
SHORTNESS OF BREATH: 0
WEAKNESS: 0
ROS GI COMMENTS: + ACID REFLUX

## 2024-07-23 ASSESSMENT — LIFESTYLE VARIABLES
HOW OFTEN DO YOU HAVE SIX OR MORE DRINKS ON ONE OCCASION: NEVER
HOW MANY STANDARD DRINKS CONTAINING ALCOHOL DO YOU HAVE ON A TYPICAL DAY: PATIENT DOES NOT DRINK

## 2024-07-23 NOTE — PATIENT INSTRUCTIONS
Today we discussed your recent hospitalization and how you have been feeling.    -Continue Trelegy 200; 1 inhalation once a day.  Rinse mouth after use.  This will be your long-acting daily maintenance inhaler to better control your COPD. It is of the utmost importance that you take this inhaler every single day in order to properly control/manage your COPD.  -Continue albuterol as needed  -Continue 4 L of oxygen at rest; 5-6L on exertion in order to maintain your oxygen levels >88%  -Continue attending pulmonary rehab; this will be very beneficial in helping build back your stamina  -Continue following with Dr. Cho from thoracic surgery  -Continue following with Dr. Escamilla from oncology    Thank you for visiting the pulmonary clinic today! It was a pleasure to participate in your care.  Please return to clinic 6 months or sooner if needed.    Bry Sykes, CNP  My Office Number: [phone]   CT Scheduling: [phone]  PFT/Follow Up Visit Scheduling: [phone]  My Nurse: CANDIS Joiner  My Denhoff: Marcelina    To reach the nurse, Marisel Mendez RN, please call ([phone]) Marisel has a secure voice mail account if you want to leave a message.    **For immediate needs such as medication issues/refills, active sick symptoms/medical concerns; I ask that you please call the office and speak to the pulmonary nurse. MyChart messages do not come directly to me. There can sometimes be a delay before I am aware of any messages that were sent. Thank you.**

## 2024-07-30 ENCOUNTER — APPOINTMENT (OUTPATIENT)
Dept: PULMONOLOGY | Facility: CLINIC | Age: 69
End: 2024-07-30
Payer: MEDICARE

## 2024-09-03 ENCOUNTER — APPOINTMENT (OUTPATIENT)
Dept: PULMONOLOGY | Facility: CLINIC | Age: 69
End: 2024-09-03
Payer: MEDICARE

## 2024-09-04 ENCOUNTER — APPOINTMENT (OUTPATIENT)
Dept: RADIOLOGY | Facility: HOSPITAL | Age: 69
End: 2024-09-04
Payer: MEDICARE

## 2024-09-11 ENCOUNTER — APPOINTMENT (OUTPATIENT)
Dept: HEMATOLOGY/ONCOLOGY | Facility: CLINIC | Age: 69
End: 2024-09-11
Payer: MEDICARE

## 2024-09-18 ENCOUNTER — OFFICE VISIT (OUTPATIENT)
Dept: HEMATOLOGY/ONCOLOGY | Facility: CLINIC | Age: 69
End: 2024-09-18
Payer: MEDICARE

## 2024-09-18 VITALS
HEART RATE: 58 BPM | RESPIRATION RATE: 18 BRPM | WEIGHT: 155.42 LBS | BODY MASS INDEX: 28.6 KG/M2 | TEMPERATURE: 98.5 F | SYSTOLIC BLOOD PRESSURE: 148 MMHG | HEIGHT: 62 IN | OXYGEN SATURATION: 97 % | DIASTOLIC BLOOD PRESSURE: 78 MMHG

## 2024-09-18 DIAGNOSIS — C34.91: Primary | ICD-10-CM

## 2024-09-18 DIAGNOSIS — Z08 ENCOUNTER FOR FOLLOW-UP SURVEILLANCE OF LUNG CANCER: ICD-10-CM

## 2024-09-18 DIAGNOSIS — R30.0 DYSURIA: ICD-10-CM

## 2024-09-18 DIAGNOSIS — Z85.118 ENCOUNTER FOR FOLLOW-UP SURVEILLANCE OF LUNG CANCER: ICD-10-CM

## 2024-09-18 LAB
APPEARANCE UR: CLEAR
BACTERIA #/AREA URNS AUTO: ABNORMAL /HPF
BILIRUB UR STRIP.AUTO-MCNC: NEGATIVE MG/DL
COLOR UR: NORMAL
GLUCOSE UR STRIP.AUTO-MCNC: NORMAL MG/DL
KETONES UR STRIP.AUTO-MCNC: NEGATIVE MG/DL
LEUKOCYTE ESTERASE UR QL STRIP.AUTO: NEGATIVE
MUCOUS THREADS #/AREA URNS AUTO: ABNORMAL /LPF
NITRITE UR QL STRIP.AUTO: NEGATIVE
PH UR STRIP.AUTO: 6 [PH]
PROT UR STRIP.AUTO-MCNC: NORMAL MG/DL
RBC # UR STRIP.AUTO: NEGATIVE /UL
RBC #/AREA URNS AUTO: ABNORMAL /HPF
SP GR UR STRIP.AUTO: 1.02
SQUAMOUS #/AREA URNS AUTO: ABNORMAL /HPF
UROBILINOGEN UR STRIP.AUTO-MCNC: NORMAL MG/DL
WBC #/AREA URNS AUTO: ABNORMAL /HPF

## 2024-09-18 PROCEDURE — 99213 OFFICE O/P EST LOW 20 MIN: CPT | Performed by: STUDENT IN AN ORGANIZED HEALTH CARE EDUCATION/TRAINING PROGRAM

## 2024-09-18 PROCEDURE — 81001 URINALYSIS AUTO W/SCOPE: CPT | Performed by: STUDENT IN AN ORGANIZED HEALTH CARE EDUCATION/TRAINING PROGRAM

## 2024-09-18 PROCEDURE — 1159F MED LIST DOCD IN RCRD: CPT | Performed by: STUDENT IN AN ORGANIZED HEALTH CARE EDUCATION/TRAINING PROGRAM

## 2024-09-18 PROCEDURE — 3008F BODY MASS INDEX DOCD: CPT | Performed by: STUDENT IN AN ORGANIZED HEALTH CARE EDUCATION/TRAINING PROGRAM

## 2024-09-18 PROCEDURE — 1125F AMNT PAIN NOTED PAIN PRSNT: CPT | Performed by: STUDENT IN AN ORGANIZED HEALTH CARE EDUCATION/TRAINING PROGRAM

## 2024-09-18 RX ORDER — NITROFURANTOIN 25; 75 MG/1; MG/1
100 CAPSULE ORAL 2 TIMES DAILY
Qty: 10 CAPSULE | Refills: 0 | Status: SHIPPED | OUTPATIENT
Start: 2024-09-18 | End: 2024-09-23

## 2024-09-18 ASSESSMENT — PAIN SCALES - GENERAL: PAINLEVEL: 5

## 2024-09-18 NOTE — PROGRESS NOTES
Patient is here with her  Sidney for follow up with Dr. Escamilla. Medications and allergies reviewed with patient.     Patient states that she has occasional constipation, she uses a stool softner at home.   Pain: migraine 5/10  Nausea/vomiting: no  Diarrhea: no  Difficulty eating: no  Weight loss: no    Per orders patient to follow up in 6 months after CT.     Patient verbalized understanding, no further questions at this time.

## 2024-09-18 NOTE — PROGRESS NOTES
"[facility]   Thoracic Oncology Follow-up Visit - [place]     Patient ID: Fatmata Plummer is a 69 y.o. female.  Primary Care Provider: Soren Covarrubias DO      Diagnosis: Stage III adenocarcinoma of lung, right (Multi) [C34.91]      Cancer Staging   Adenocarcinoma of lung (Multi)  Staging form: Lung, AJCC 8th Edition  - Pathologic stage from 9/13/2023: Stage IIIA (pT1b, pN2, cM0) - Signed by Olivia Escamilla MD on 11/2/2023     Molecular Studies    PDL 1  5%   NGS KRAS G12c     Oncologic History:  2009 - Stage IA Nodular Sclerosis Hodgkin Lymphoma s/p 4 cycles ABVD and IFRT. Treated by Dr. Mark Brar.    4/2023 - Right Lower Lobe lung nodule noted on lung cancer screening CT scan, growing on follow-up CT 3 months later     9/13/23 Right lower lobe wedge resection by Dr. Cho     Initial Presentation:  10/25/2023 Patient presents in a wheelchair accompanied by her . She is noticeably tired, reports she did not sleep much last night due to anxiety about the visit. She listens and participates, but her  does most of the talking.    They are aware of the lung cancer diagnosis which was reviewed by Dr. Cho and by Dr. Cao. They understood from Dr. Cho that adjuvant therapy (chemo) could be considered, but would have to be weight against her other co-morbidities. They come today for a second opinion.    Prior to surgery she was able to do ADLs in the home and accompany her  for grocery shopping. Since the surgery, she has great difficulty with activity, she feels winded even with short walks inside the house and needs help with dressing and bathing.     PMH: Mrs. Plummer has severe COPD, follows with Pulmonary and requires continuous use of oxygen, 4L/min.    HPI   Chief Concern: \"I'm doing well, using 4L oxygen\"    Interval History:   Fatmata presents with her  for follow-up visit and scan review.     Patient notes slow recovery after her admission in June " for pneumonia. She continues to have significant fatigue. Is mostly in a chair during the day. She notes her dyspnea is stable on 4L at rest and 5L with exertion. She denies fever, cough, weight loss, LAD, n/v/d/c.     She does note dysuria, foul smelling urine, and suprapubic tenderness starting 4 days prior. She attributes this to a UTI which she gets a couple times a year. Denies flank pain.     She has a migraine today as well. She asks if she needs to be taking her topiramate, which she stopped by herself recently.     Review of Systems - 10 systems reviewed and negative unless noted.  Pertinent Positive: dyspnea with exertion    Pertinent Negatives:  nausea  vomiting  diarrhea  constipation  vision change, hearing change, tinnitus,   chest pain,   abdominal pain,   numbness/tingling in fingers or toes  fevers, chills, sick contacts.  insomnia, mood changes    Meds (Current):  Current Outpatient Medications   Medication Instructions    acetaminophen-codeine (Tylenol w/ Codeine #3) 300-30 mg tablet 1 tablet, oral, Every 6 hours PRN    albuterol 90 mcg/actuation aerosol powdr breath activated inhaler 2 puffs, inhalation, Every 4 hours PRN    albuterol 2.5 mg, nebulization, Every 4 hours PRN    atorvastatin (LIPITOR) 20 mg, oral, Daily    benzonatate (TESSALON) 100 mg, oral, 3 times daily PRN, Do not crush or chew.    BLACK COHOSH ORAL 40 mg, oral, Every morning    cyanocobalamin (VITAMIN B-12) 1,000 mcg, intramuscular, Every 30 days    diphenhydrAMINE (BENADRYL) 50 mg, oral, Nightly    diphenhydrAMINE-acetaminophen (Tylenol PM)  mg per tablet 2 tablets, oral, Nightly PRN    DULoxetine (Cymbalta) 60 mg DR capsule 1 capsule, Every 24 hours    fluticasone-umeclidin-vilanter (Trelegy Ellipta) 200-62.5-25 mcg blister with device 1 puff, inhalation, Daily, Rinse mouth with water after use to reduce aftertaste and incidence of candidiasis. Do not swallow.    folic acid (FOLVITE) 1 mg, oral, Every morning     "gabapentin (Neurontin) 300 mg capsule 3 capsules, oral, 2 times daily    hydrOXYzine HCL (ATARAX) 25 mg, oral, 2 times daily    levothyroxine (SYNTHROID, LEVOXYL) 25 mcg, oral, Daily before breakfast    LORazepam (ATIVAN) 2 mg, oral, Every morning,      LORazepam (ATIVAN) 1 mg, oral, Nightly    oxygen (O2) 4 L/min, inhalation, Continuous, DME IRA    promethazine (Phenergan) 25 mg tablet 1 tablet, oral, 3 times daily (0900,1400,1900)    rOPINIRole (REQUIP) 1 mg, oral, Nightly    tiZANidine (Zanaflex) 4 mg tablet 2 tablets, oral, Nightly,  1 tablet as needed Orally Three times a day       Allergies   Allergen Reactions    Bupropion Other and Unknown    Buspirone Other    Cilostazol Unknown    Latex Other     BLISTER    Pseudoephedrine Other and Unknown    Nsaids (Non-Steroidal Anti-Inflammatory Drug) Palpitations       Objective   BSA: 1.75 meters squared    Visit Vitals  /78 (BP Location: Right arm, Patient Position: Sitting, BP Cuff Size: Adult long)   Pulse 58   Temp 36.9 °C (98.5 °F) (Temporal)   Resp 18   Ht (S) 1.572 m (5' 1.89\")   Wt 70.5 kg (155 lb 6.8 oz)   SpO2 97% Comment: 5l   BMI 28.53 kg/m²   OB Status Hysterectomy   Smoking Status Former   BSA 1.75 m²        Performance Status:  (2) Ambulatory and capable of self care, unable to carry out work activity, up and about > 50% or waking hours     Physical Exam    General: Pleasant woman, appears stated age, well-groomed in street clothes, Alert and oriented, actively participates in visit  Head: Hair present, fully covering scalp. Symmetric facial expressions  Eyes: PERRL, EOMI, clear sclera, eyebrows present.  Ears/Nose/Mouth/Throat:  Oral mucous membranes moist. No oral ulcers. No palpable pre/post-auricular lymph nodes  Neck: No palpable cervical chain lymph nodes  Respiratory: nasal cannula in place, oxygen flow 4L/min. Patient able to maintain conversation without stopping to breathe. Good air movement in all lung fields, clear to " auscultation  Cardio: Regular rate and rhythm, normal S1 and S2, radial pulses symmetric  GI: Nondistended, soft, non-tender abdomen  Musculoskeletal: Normal muscle bulk and tone, ROM intact, no joint swelling.  Seated in wheelchair, feels too weak to stand/walk.  Extremities: No ankle swelling, no arm or leg wounds, clubbing on nails  Neuro: Alert, cognition intact, speech normal. Facial expressions symmetric.  No motor deficits noted. Sensation intact to touch and hot/cold.   Moves arms and legs against resistance while seated.   Psychological: Appropriate mood and behavior.  Skin: Warm and dry, no lesions, no rashes    Results:  Labs: no new labs for this visit.    Imaging:  I have personally reviewed the below imaging and concur with the reported findings unless otherwise stated:    11/28/2023 CT chest wo IV contrast  -post- surgical changes noted in R lower lobe segment  -right paratracheal lymph nodes slightly enlarged      8/14/2023 PET CT  IMPRESSION:  1. Hypermetabolic right lower lobe nodule is highly concerning for  malignancy. Recommend tissue biopsy for further evaluation.  2. No evidence of hypermetabolic lymphadenopathy or metastatic disease.      Pathology:  Accession #: [id number]            Pathologist:                   KAITLIN PANDEY MD  Date of Procedure:    9/13/2023  Date Received:          9/13/2023  Date Reported           9/20/2023  Submitting Physician:   BROOKE FARNSWORTH MD  Location:                    [facility]  Other External #    Procedures/Addenda Present  FINAL DIAGNOSIS  A.  RIGHT LUNG, LOWER LOBE, PULMONARY WEDGE RESECTION:  -- INVASIVE ACINAR ADENOCARCINOMA (1.8 CM) INVOLVING LUNG PARENCHYMA; SEE NOTE AND SYNOPTIC REPORT.    NOTE: Additional immunostain for PD-L1 and next generation sequencing (NGS) will be performed and reported in addenda.    B.  LEVEL 8 LYMPH NODE, EXCISION:  -- ONE (1) LYMPH NODE NEGATIVE FOR TUMOR.    C.  LEVEL 9 LYMPH NODE, EXCISION:  -- ONE (1)  LYMPH NODE NEGATIVE FOR TUMOR.    D.  LEVEL 7 LYMPH NODE, EXCISION:  -- METASTATIC CARCINOMA TO FRAGMENTS OF LYMPH NODE(S).    E.  LEVEL 10 LYMPH NODE, EXCISION:  -- FRAGMENTS OF LYMPH NODE(S) NEGATIVE FOR TUMOR.    F.  LEVEL 4R LYMPH NODE, EXCISION:  -- FRAGMENTS OF LYMPH NODE(S) NEGATIVE FOR TUMOR.    CASE SUMMARY REPORT       SPECIMEN  Procedure:      Wedge resection  Specimen Laterality:      Right  TUMOR  Tumor Focality:      Single focus  Tumor Site:      Lower lobe of lung     Tumor Size     Total Tumor Size (size of entire tumor):      Greatest Dimension  (Centimeters): 1.8 cm  Histologic Type:      Invasive acinar adenocarcinoma  Visceral Pleura Invasion:      Not identified  Direct Invasion of Adjacent Structures:      Not applicable (no adjacent  structures present)  Treatment Effect:      No known presurgical therapy  Lymphovascular Invasion:      Not identified  MARGINS  Margin Status for Invasive Carcinoma:      All margins negative for invasive  carcinoma   Closest Margin(s) to Invasive Carcinoma:        Parenchymal  Margin Status for Non-Invasive Tumor:      Not applicable  REGIONAL LYMPH NODES  Lymph Node(s) from Prior Procedures:      No known prior lymph node sampling  performed  Regional Lymph Node Status:        Tumor present in regional lymph node(s)     Number of Lymph Nodes with Tumor:          At least: 1     Kassidy Site(s) with Tumor:          7: Subcarinal   Number of Lymph Nodes Examined:        At least: 5    Kassidy Site(s) Examined:         4R: Lower paratracheal          8R: Para-esophageal (below amarilys)          9R: Pulmonary ligament          10R: Hilar          7: Subcarinal  PATHOLOGIC STAGE CLASSIFICATION (pTNM, AJCC 8th Edition)  pT Category:      pT1b  pN Category:      pN2  Representative Blocks:      Normal Block: A8       Tumor Block: A3     Addendum Diagnosis   PD-L1 22C3  by Immunohistochemistry with Interpretation, pembrolizumab   Block used:  A3   Interpretation: Low  Expression   Tumor Proportion Score (TPS): 5 %     Addendum Diagnosis   TEST: Focused Solid Tumor DNA/RNA Panel   SPECIMEN: FFPE, LUNG, RIGHT LOWER LOBE, [id number] A3   DISEASE DIAGNOSIS: Adenocarcinoma   Estimated Tumor Content: 40%   COLLECTION DATE: 9/13/2023   RECEIVED DATE: 9/22/2023   REPORT DATE: 09/27/2023     MICROSATELLITE STATUS: Microsatellite Instability-High (MSI-H) is NOT DETECTED.     DISEASE ASSOCIATED GENOMIC FINDINGS:   KRAS p.G12C (NM_033360 c.34G>T)   TP53 p.R249W (NM_000546 c.745A>T)     Surgical Pathology (reviewed in [facility])  Accession #: [id number]  Date of Procedure:  6/25/2009  Pathologist:  Santos ESPINOSA Reported: 6/25/2009  Submitting Physician: JAILYN MCKENZIE D.O.    FINAL DIAGNOSIS  A.  RIGHT AXILLARY LYMPH NODE, [id number] (6/3/09):HODGKIN'S LYMPHOMA NODULAR SCLEROSIS TYPE.    Microscopic Description: Sections show lymph node with partial involvement by Hodgkin's Lymphoma.  Thereare syncytia of Sacha-Andres cells in an appropriate mixed cellularbackground and focal sclerosis. Immunostain for LCA, fascin, CD15, CD20, CD30, and CD3 are consistent withHodgkin's Lymphoma.      Assessment/Plan      Fatmata Plummer is a 69 y.o. female with prior stage IA Hodgkin lymphoma (R axilla) treated in 2009 by Dr. Mckenzie, severe COPD requiring oxygen ,and recent diagnosis of pT1bN2 adenocarcinoma of the Right lower lobe, surgically resected 9/13/2023 by Dr. Cho.     She has Stage IIIA lung cancer, which has a recurrence risk of about 50% over 5 years. Adjuvant chemotherapy and immunotherapy is typically recommended to decrease the risk of recurrence. However, she has several medical co-morbidities and decreased performance status (ECOG 3) that put her at higher risk for complications from adjuvant therapy. Extensive conversation at 10/25/2023 visit with shared decision-making for surveillance strategy.    Surveillance imaging is recommended to monitor for disease recurrence with CT Chest every  3-6 months in the first 2 years, then yearly for a total of 5 years.    Today 9/18, I reviewed results of CT chest with which shows stable post-surgical changes in Right upper lobe and small R pleural effusion. No lymphandenopathy.  No new pulmonary nodules.   Abnormalities from pneumonia 6/2024 have resolved.     Continue with surveillance CT chest wo contrast in 6 months.    MRI Brain - no intracranial tumors. Repeat yearly (or sooner if CNS symptoms occur).    Severe COPD  Following with Pulmonary, starting Trelegy inhaler    She completed Pulmonary Rehab.     Patient and her  asked several questions, which I answered to their satisfaction.    Acute Dysuria, suspect lower urinary tract infeciton  -Urinalysis with reflex culture  -Nitrofurantoin x 5 days    Time spent face to face with patient: 20 minutes     Olivia Escamilla MD  [facility]

## 2024-10-22 ENCOUNTER — APPOINTMENT (OUTPATIENT)
Dept: CARDIAC SURGERY | Facility: CLINIC | Age: 69
End: 2024-10-22
Payer: MEDICARE

## 2024-11-07 ENCOUNTER — APPOINTMENT (OUTPATIENT)
Dept: RADIOLOGY | Facility: HOSPITAL | Age: 69
End: 2024-11-07
Payer: MEDICARE

## 2024-11-07 ENCOUNTER — HOSPITAL ENCOUNTER (EMERGENCY)
Facility: HOSPITAL | Age: 69
Discharge: HOME | End: 2024-11-07
Attending: EMERGENCY MEDICINE
Payer: MEDICARE

## 2024-11-07 VITALS
WEIGHT: 150 LBS | DIASTOLIC BLOOD PRESSURE: 81 MMHG | TEMPERATURE: 97.9 F | SYSTOLIC BLOOD PRESSURE: 146 MMHG | OXYGEN SATURATION: 95 % | HEIGHT: 62 IN | RESPIRATION RATE: 20 BRPM | BODY MASS INDEX: 27.6 KG/M2 | HEART RATE: 118 BPM

## 2024-11-07 DIAGNOSIS — S82.842A ANKLE FRACTURE, BIMALLEOLAR, CLOSED, LEFT, INITIAL ENCOUNTER: Primary | ICD-10-CM

## 2024-11-07 PROCEDURE — 73630 X-RAY EXAM OF FOOT: CPT | Mod: LT

## 2024-11-07 PROCEDURE — 99284 EMERGENCY DEPT VISIT MOD MDM: CPT | Mod: 25

## 2024-11-07 PROCEDURE — 73590 X-RAY EXAM OF LOWER LEG: CPT | Mod: LEFT SIDE | Performed by: RADIOLOGY

## 2024-11-07 PROCEDURE — 73610 X-RAY EXAM OF ANKLE: CPT | Mod: LT

## 2024-11-07 PROCEDURE — 73630 X-RAY EXAM OF FOOT: CPT | Mod: LEFT SIDE | Performed by: RADIOLOGY

## 2024-11-07 PROCEDURE — 73610 X-RAY EXAM OF ANKLE: CPT | Mod: LEFT SIDE | Performed by: RADIOLOGY

## 2024-11-07 PROCEDURE — 73590 X-RAY EXAM OF LOWER LEG: CPT | Mod: LT

## 2024-11-07 PROCEDURE — 29515 APPLICATION SHORT LEG SPLINT: CPT | Mod: LT

## 2024-11-07 PROCEDURE — 2500000001 HC RX 250 WO HCPCS SELF ADMINISTERED DRUGS (ALT 637 FOR MEDICARE OP): Performed by: EMERGENCY MEDICINE

## 2024-11-07 RX ORDER — OXYCODONE AND ACETAMINOPHEN 5; 325 MG/1; MG/1
1 TABLET ORAL ONCE
Status: COMPLETED | OUTPATIENT
Start: 2024-11-07 | End: 2024-11-07

## 2024-11-07 ASSESSMENT — PAIN DESCRIPTION - PAIN TYPE: TYPE: ACUTE PAIN

## 2024-11-07 ASSESSMENT — COLUMBIA-SUICIDE SEVERITY RATING SCALE - C-SSRS
1. IN THE PAST MONTH, HAVE YOU WISHED YOU WERE DEAD OR WISHED YOU COULD GO TO SLEEP AND NOT WAKE UP?: NO
6. HAVE YOU EVER DONE ANYTHING, STARTED TO DO ANYTHING, OR PREPARED TO DO ANYTHING TO END YOUR LIFE?: NO
2. HAVE YOU ACTUALLY HAD ANY THOUGHTS OF KILLING YOURSELF?: NO

## 2024-11-07 ASSESSMENT — PAIN DESCRIPTION - DESCRIPTORS: DESCRIPTORS: ACHING

## 2024-11-07 ASSESSMENT — LIFESTYLE VARIABLES
HAVE YOU EVER FELT YOU SHOULD CUT DOWN ON YOUR DRINKING: NO
EVER FELT BAD OR GUILTY ABOUT YOUR DRINKING: NO
EVER HAD A DRINK FIRST THING IN THE MORNING TO STEADY YOUR NERVES TO GET RID OF A HANGOVER: NO
TOTAL SCORE: 0
HAVE PEOPLE ANNOYED YOU BY CRITICIZING YOUR DRINKING: NO

## 2024-11-07 ASSESSMENT — PAIN DESCRIPTION - ORIENTATION: ORIENTATION: LEFT

## 2024-11-07 ASSESSMENT — PAIN DESCRIPTION - LOCATION: LOCATION: FOOT

## 2024-11-07 ASSESSMENT — PAIN SCALES - GENERAL
PAINLEVEL_OUTOF10: 7
PAINLEVEL_OUTOF10: 9

## 2024-11-07 ASSESSMENT — PAIN - FUNCTIONAL ASSESSMENT: PAIN_FUNCTIONAL_ASSESSMENT: 0-10

## 2024-11-07 NOTE — ED TRIAGE NOTES
Patient states that about 3 weeks ago she injured her left foot when she fell during a night terror, since then the pain has gradually increased and she has been unable to ambulate or bear weight on it without pain.

## 2024-11-07 NOTE — ED PROVIDER NOTES
HPI   Chief Complaint   Patient presents with    Foot Injury       Patient is a 69-year-old female with past medical history of COPD and stroke, who presents to the emergency department with left lower extremity pain and swelling, due to a fall 3 weeks ago during a night terror episode.  She felt out of her bed and twisted her leg.  She states that the door is extremely hard and the bed is elevated.  She thought that the pain was initially due to a sprain, but because it has gotten better and she cannot put any weight on it she decided to come to the emergency department.  She denies any sensation loss in the extremity or weakness.              Patient History   Past Medical History:   Diagnosis Date    Age-related nuclear cataract, bilateral 12/07/2015    Cataract, nuclear sclerotic, both eyes    Age-related nuclear cataract, right eye 04/01/2016    Age-related nuclear cataract of right eye    Aphakia, left eye 01/21/2016    Aphakia of left eye    COPD (chronic obstructive pulmonary disease) (Multi)     Cortical age-related cataract, bilateral 12/07/2015    Cortical age-related cataract of both eyes    Hodgkin lymphoma, unspecified, unspecified site (Multi) 12/04/2013    Hodgkin's disease    Hypermetropia, bilateral 04/01/2016    Hyperopia of both eyes with astigmatism and presbyopia    Lung cancer (Multi) 9/13/2023@ ,    Other chest pain 07/16/2021    Atypical chest pain    Other conditions influencing health status 12/07/2015    PSC (posterior subcapsular cataract), bilateral    Other postherpetic nervous system involvement 07/16/2021    Post herpetic neuralgia    Personal history of other diseases of the respiratory system     History of chronic obstructive lung disease    Personal history of other infectious and parasitic diseases 07/16/2021    History of herpes zoster    Personal history of pneumonia (recurrent) 08/07/2014    History of pneumonia    Posterior subcapsular polar age-related cataract, right eye  04/01/2016    Posterior subcapsular polar age-related cataract of right eye    Presence of intraocular lens 01/21/2016    Pseudophakia of left eye    Stroke (Multi) 2000     Past Surgical History:   Procedure Laterality Date    CATARACT EXTRACTION  02/24/2016    Cataract Surgery    HYSTERECTOMY  09/18/2014    Hysterectomy    LUNG SURGERY Right 09/13/2023    Right lower lobe wedge resection    STRABISMUS SURGERY  12/07/2015    Strabismus Surgery    TUBAL LIGATION  6/1984     Family History   Adopted: Yes     Social History     Tobacco Use    Smoking status: Former     Current packs/day: 0.00     Average packs/day: 1 pack/day for 39.0 years (39.0 ttl pk-yrs)     Types: Cigarettes     Start date: 1975     Quit date: 2014     Years since quitting: 10.8     Passive exposure: Never    Smokeless tobacco: Never   Vaping Use    Vaping status: Never Used   Substance Use Topics    Alcohol use: Not Currently    Drug use: Not Currently     Comment: CBD gummies sometimes       Physical Exam   ED Triage Vitals [11/07/24 1107]   Temperature Heart Rate Respirations BP   36.6 °C (97.9 °F) (!) 118 20 146/81      Pulse Ox Temp Source Heart Rate Source Patient Position   95 % Temporal Monitor --      BP Location FiO2 (%)     -- --       Physical Exam  Constitutional:       General: She is not in acute distress.     Appearance: Normal appearance.   HENT:      Head: Normocephalic and atraumatic.      Right Ear: External ear normal.      Left Ear: External ear normal.      Nose: No congestion or rhinorrhea.      Mouth/Throat:      Mouth: Mucous membranes are moist.   Eyes:      General:         Right eye: No discharge.         Left eye: No discharge.      Extraocular Movements: Extraocular movements intact.      Conjunctiva/sclera: Conjunctivae normal.   Cardiovascular:      Rate and Rhythm: Normal rate and regular rhythm.   Pulmonary:      Effort: No respiratory distress.   Musculoskeletal:         General: Tenderness present.      Right  lower leg: Normal.      Left lower leg: Swelling and bony tenderness present.      Right ankle: Normal.      Left ankle: Swelling present. Tenderness present. Normal range of motion. Normal pulse.        Legs:       Comments: Tenderness marked by red pen.  Mid tibial, bilateral malleoli, and midfoot.   Skin:     General: Skin is warm and dry.      Capillary Refill: Capillary refill takes less than 2 seconds.   Neurological:      General: No focal deficit present.      Mental Status: She is alert and oriented to person, place, and time. Mental status is at baseline.   Psychiatric:         Mood and Affect: Mood normal.         Behavior: Behavior normal.         Thought Content: Thought content normal.           ED Course & MDM   ED Course as of 11/07/24 1327   Thu Nov 07, 2024   1302 XR ankle left 3+ views  1. Acute nondisplaced transverse fracture of the medial malleolus.  2. Acute minimally displaced oblique lateral malleolus fracture. No  syndesmotic widening seen.  3. Regional soft tissue swelling around the left ankle fractures.  4. No acute bony abnormality in the remainder of the left tibia,  fibula or foot.   [MP]      ED Course User Index  [MP] Tom Callahan,          Diagnoses as of 11/07/24 1327   Ankle fracture, bimalleolar, closed, left, initial encounter                 No data recorded     Prudencio Coma Scale Score: 15 (11/07/24 1109 : Joel Pace RN)                           Medical Decision Making  Patient is 69-year-old female who presents with left lower extremity pain.  She has been having the pain for 3 weeks since experiencing a fall.  She had tenderness to the mid tibia, bilateral malleoli, and midfoot.  Due to the swelling and lack of improvement in her symptoms for the past 3 weeks, with her physical exam, it was appropriate x-ray imaging for concern of fractures.  It is still possible that she has ligamentous damage in her knee.  X-ray imaging revealed bimalleolar fracture of the left  ankle.  Appropriate and definitive treatment was provided with ankle stirrup splinting.  Patient was provided referral to podiatry for further evaluation and management outpatient.  She was discharged hemodynamically stable.        Procedure  Procedures     Tom Callahan DO  Resident  11/07/24 1250       Tom Callahan DO  Resident  11/07/24 1329       Tom Callahan DO  Resident  11/07/24 2139

## 2025-03-18 ENCOUNTER — HOSPITAL ENCOUNTER (OUTPATIENT)
Dept: RADIOLOGY | Facility: HOSPITAL | Age: 70
Discharge: HOME | End: 2025-03-18
Payer: MEDICARE

## 2025-03-18 DIAGNOSIS — Z85.118 ENCOUNTER FOR FOLLOW-UP SURVEILLANCE OF LUNG CANCER: ICD-10-CM

## 2025-03-18 DIAGNOSIS — C34.91: ICD-10-CM

## 2025-03-18 DIAGNOSIS — Z08 ENCOUNTER FOR FOLLOW-UP SURVEILLANCE OF LUNG CANCER: ICD-10-CM

## 2025-03-18 PROCEDURE — 71250 CT THORAX DX C-: CPT

## 2025-03-25 ENCOUNTER — APPOINTMENT (OUTPATIENT)
Dept: HEMATOLOGY/ONCOLOGY | Facility: CLINIC | Age: 70
End: 2025-03-25
Payer: MEDICARE

## 2025-03-25 ENCOUNTER — OFFICE VISIT (OUTPATIENT)
Dept: HEMATOLOGY/ONCOLOGY | Facility: CLINIC | Age: 70
End: 2025-03-25
Payer: MEDICARE

## 2025-03-25 VITALS
BODY MASS INDEX: 25.77 KG/M2 | DIASTOLIC BLOOD PRESSURE: 63 MMHG | WEIGHT: 140.87 LBS | TEMPERATURE: 98.6 F | RESPIRATION RATE: 18 BRPM | SYSTOLIC BLOOD PRESSURE: 115 MMHG | HEART RATE: 113 BPM | OXYGEN SATURATION: 96 %

## 2025-03-25 DIAGNOSIS — C34.91 ADENOCARCINOMA OF RIGHT LUNG (MULTI): Primary | ICD-10-CM

## 2025-03-25 PROCEDURE — 99214 OFFICE O/P EST MOD 30 MIN: CPT | Performed by: NURSE PRACTITIONER

## 2025-03-25 PROCEDURE — 1159F MED LIST DOCD IN RCRD: CPT | Performed by: NURSE PRACTITIONER

## 2025-03-25 PROCEDURE — 1126F AMNT PAIN NOTED NONE PRSNT: CPT | Performed by: NURSE PRACTITIONER

## 2025-03-25 PROCEDURE — 1157F ADVNC CARE PLAN IN RCRD: CPT | Performed by: NURSE PRACTITIONER

## 2025-03-25 PROCEDURE — 1160F RVW MEDS BY RX/DR IN RCRD: CPT | Performed by: NURSE PRACTITIONER

## 2025-03-25 ASSESSMENT — PAIN SCALES - GENERAL: PAINLEVEL_OUTOF10: 0-NO PAIN

## 2025-03-25 NOTE — PROGRESS NOTES
[facility]   Thoracic Oncology Follow-up Visit - [place]     Patient ID: Fatmata Plummer is a 69 y.o. female.  Primary Care Provider: Soren Covarrubias DO      Diagnosis: Stage III adenocarcinoma of lung, right (Multi) [C34.91]      Cancer Staging   Adenocarcinoma of lung (Multi)  Staging form: Lung, AJCC 8th Edition  - Pathologic stage from 9/13/2023: Stage IIIA (pT1b, pN2, cM0) - Signed by Olivia Escamilla MD on 11/2/2023     Molecular Studies    PDL 1  5%   NGS KRAS G12c     Oncologic History:  2009 - Stage IA Nodular Sclerosis Hodgkin Lymphoma s/p 4 cycles ABVD and IFRT. Treated by Dr. Mark Brar.    4/2023 - Right Lower Lobe lung nodule noted on lung cancer screening CT scan, growing on follow-up CT 3 months later     9/13/23 Right lower lobe wedge resection by Dr. Cho     Initial Presentation:  10/25/2023 Patient presents in a wheelchair accompanied by her . She is noticeably tired, reports she did not sleep much last night due to anxiety about the visit. She listens and participates, but her  does most of the talking.    They are aware of the lung cancer diagnosis which was reviewed by Dr. Cho and by Dr. Cao. They understood from Dr. Cho that adjuvant therapy (chemo) could be considered, but would have to be weight against her other co-morbidities. They come today for a second opinion.    Prior to surgery she was able to do ADLs in the home and accompany her  for grocery shopping. Since the surgery, she has great difficulty with activity, she feels winded even with short walks inside the house and needs help with dressing and bathing.     PMH: Mrs. Plummer has severe COPD, follows with Pulmonary and requires continuous use of oxygen, 4L/min.    HPI     Interval History:   March 25, 2025  Patient presents with her  for follow-up visit and scan review. She reports she is doing well at this time. She has been trying to decrease her oxygen need.  At rest she is able to decrease to 3L. Overall she remains on 4L at rest and 5L with exertion. She denies any fevers, chills or night sweats. No chest pain. No nausea or vomiting. No constipation or diarrhea. No urinary symptoms. No rash. No neuropathy.   Continued weight loss.     Review of Systems:  A review of systems has been completed and are negative for complaints except what is stated in the HPI and/or past medical history      Meds (Current):    Current Outpatient Medications:     acetaminophen-codeine (Tylenol w/ Codeine #3) 300-30 mg tablet, Take 1 tablet by mouth every 6 hours if needed., Disp: , Rfl:     albuterol 2.5 mg /3 mL (0.083 %) nebulizer solution, Take 3 mL (2.5 mg) by nebulization every 4 hours if needed for wheezing., Disp: , Rfl:     albuterol 90 mcg/actuation aerosol powdr breath activated inhaler, Inhale 2 puffs every 4 hours if needed for wheezing or shortness of breath., Disp: , Rfl:     atorvastatin (Lipitor) 20 mg tablet, Take 1 tablet (20 mg) by mouth once daily., Disp: , Rfl:     BLACK COHOSH ORAL, Take 40 mg by mouth once daily in the morning., Disp: , Rfl:     diphenhydrAMINE (BENADryl) 25 mg capsule, Take 2 capsules (50 mg) by mouth once daily at bedtime., Disp: , Rfl:     diphenhydrAMINE-acetaminophen (Tylenol PM)  mg per tablet, Take 2 tablets by mouth as needed at bedtime for sleep., Disp: , Rfl:     DULoxetine (Cymbalta) 60 mg DR capsule, 1 capsule (60 mg) once every 24 hours., Disp: , Rfl:     folic acid (Folvite) 400 mcg tablet, Take 2.5 tablets (1 mg) by mouth once daily in the morning., Disp: , Rfl:     gabapentin (Neurontin) 300 mg capsule, Take 3 capsules (900 mg) by mouth 2 times a day., Disp: , Rfl:     levothyroxine (Synthroid, Levoxyl) 25 mcg tablet, Take 1 tablet (25 mcg) by mouth once daily in the morning. Take before meals., Disp: , Rfl:     LORazepam (Ativan) 1 mg tablet, Take 2 tablets (2 mg) by mouth once daily in the morning., Disp: , Rfl:     LORazepam  (Ativan) 1 mg tablet, Take 1 tablet (1 mg) by mouth once daily at bedtime., Disp: , Rfl:     oxygen (O2) gas therapy, Inhale 4 L/min continuously. DME IRA, Disp: , Rfl:     promethazine (Phenergan) 25 mg tablet, Take 1 tablet (25 mg) by mouth 3 times a day., Disp: , Rfl:     rOPINIRole (Requip) 1 mg tablet, Take 1 tablet (1 mg) by mouth once daily at bedtime., Disp: , Rfl:     tiZANidine (Zanaflex) 4 mg tablet, Take 2 tablets (8 mg) by mouth once daily at bedtime.  1 tablet as needed Orally Three times a day, Disp: , Rfl:     fluticasone-umeclidin-vilanter (Trelegy Ellipta) 200-62.5-25 mcg blister with device, Inhale 1 puff once daily. Rinse mouth with water after use to reduce aftertaste and incidence of candidiasis. Do not swallow. (Patient not taking: Reported on 3/25/2025), Disp: 60 each, Rfl: 3      Allergies   Allergen Reactions    Bupropion Other and Unknown    Buspirone Other    Cilostazol Unknown    Latex Other     BLISTER    Pseudoephedrine Other and Unknown    Nsaids (Non-Steroidal Anti-Inflammatory Drug) Palpitations       Objective   Vital Signs  /63 (BP Location: Right arm, Patient Position: Sitting, BP Cuff Size: Adult)   Pulse (!) 113   Temp 37 °C (98.6 °F) (Temporal)   Resp 18   Wt 63.9 kg (140 lb 14 oz)   SpO2 96% Comment: 5l  BMI 25.77 kg/m²     Wt Readings from Last 5 Encounters:   25 63.9 kg (140 lb 14 oz)   24 68 kg (150 lb)   24 70.5 kg (155 lb 6.8 oz)   24 69.9 kg (154 lb 1.6 oz)   24 67.1 kg (148 lb)     Physical Exam:  ECO  Pain: 0  Constitutional: Well developed, awake/alert/oriented x3, no distress, alert and cooperative  Eyes: PER. sclera anicteric  ENMT: Oral mucosa moist, no lesions or thrush identified  Respiratory/Thorax: Breathing is non-labored. Lungs are clear to auscultation bilaterally. No adventitious breath sounds  Cardiovascular: S1-S2. Regular rate and rhythm. No murmurs, rubs, or gallops appreciated  Gastrointestinal: Abdomen  soft nontender, nondistended, normal active bowel sounds.  Musculoskeletal: ROM intact, no joint swelling, normal strength  Extremities: normal extremities, no cyanosis, no edema, no clubbing  Lymphatics: no palpable lymphadenopathy  Skin: no rash  Neurologic: alert and oriented x3. Nonfocal exam. No myoclonus  Psychological: Pleasant, appropriate and easily engaged       Results:  Lab Results   Component Value Date    WBC 8.7 06/06/2024    HGB 11.6 (L) 06/06/2024    HCT 38.6 06/06/2024    MCV 92 06/06/2024     06/06/2024      Lab Results   Component Value Date    NEUTROABS 9.85 (H) 06/03/2024      Lab Results   Component Value Date    GLUCOSE 95 06/06/2024    CALCIUM 8.9 06/06/2024     06/06/2024    K 3.8 06/06/2024    CO2 30 06/06/2024     06/06/2024    BUN 17 06/06/2024    CREATININE 0.86 06/06/2024    MG 2.55 (H) 06/06/2024     Lab Results   Component Value Date    ALT 9 06/03/2024    AST 13 06/03/2024    ALKPHOS 92 06/03/2024    BILITOT 0.6 06/03/2024      Lab Results   Component Value Date    TSH 0.56 06/03/2024         Imaging:  CT Chest March 18, 2025 (reviewed with patient and her )  IMPRESSION:  Adenocarcinoma of the lung and Hodgkin's lymphoma restaging scan, in comparison to prior CT from July 2024:      Mild interval increase in size of multiple mediastinal lymph nodes, although nonenlarged by size criteria, early disease recurrence is not excluded. Short-term follow-up CT chest is recommended in 8-12 weeks. Alternatively, further evaluation with PET-CT could be considered. Additional chronic and incidental findings as detailed    11/28/2023 CT chest wo IV contrast  -post- surgical changes noted in R lower lobe segment  -right paratracheal lymph nodes slightly enlarged      8/14/2023 PET CT  IMPRESSION:  1. Hypermetabolic right lower lobe nodule is highly concerning for  malignancy. Recommend tissue biopsy for further evaluation.  2. No evidence of hypermetabolic  lymphadenopathy or metastatic disease.      Pathology:  Accession #: [id number]            Pathologist:                   KAITLIN PANDEY MD  Date of Procedure:    9/13/2023  Date Received:          9/13/2023  Date Reported           9/20/2023  Submitting Physician:   BROOKE FARNSWORTH MD  Location:                    [facility]  Other External #    Procedures/Addenda Present  FINAL DIAGNOSIS  A.  RIGHT LUNG, LOWER LOBE, PULMONARY WEDGE RESECTION:  -- INVASIVE ACINAR ADENOCARCINOMA (1.8 CM) INVOLVING LUNG PARENCHYMA; SEE NOTE AND SYNOPTIC REPORT.    NOTE: Additional immunostain for PD-L1 and next generation sequencing (NGS) will be performed and reported in addenda.    B.  LEVEL 8 LYMPH NODE, EXCISION:  -- ONE (1) LYMPH NODE NEGATIVE FOR TUMOR.    C.  LEVEL 9 LYMPH NODE, EXCISION:  -- ONE (1) LYMPH NODE NEGATIVE FOR TUMOR.    D.  LEVEL 7 LYMPH NODE, EXCISION:  -- METASTATIC CARCINOMA TO FRAGMENTS OF LYMPH NODE(S).    E.  LEVEL 10 LYMPH NODE, EXCISION:  -- FRAGMENTS OF LYMPH NODE(S) NEGATIVE FOR TUMOR.    F.  LEVEL 4R LYMPH NODE, EXCISION:  -- FRAGMENTS OF LYMPH NODE(S) NEGATIVE FOR TUMOR.    CASE SUMMARY REPORT       SPECIMEN  Procedure:      Wedge resection  Specimen Laterality:      Right  TUMOR  Tumor Focality:      Single focus  Tumor Site:      Lower lobe of lung     Tumor Size     Total Tumor Size (size of entire tumor):      Greatest Dimension  (Centimeters): 1.8 cm  Histologic Type:      Invasive acinar adenocarcinoma  Visceral Pleura Invasion:      Not identified  Direct Invasion of Adjacent Structures:      Not applicable (no adjacent  structures present)  Treatment Effect:      No known presurgical therapy  Lymphovascular Invasion:      Not identified  MARGINS  Margin Status for Invasive Carcinoma:      All margins negative for invasive  carcinoma   Closest Margin(s) to Invasive Carcinoma:        Parenchymal  Margin Status for Non-Invasive Tumor:      Not applicable  REGIONAL LYMPH NODES  Lymph  Node(s) from Prior Procedures:      No known prior lymph node sampling  performed  Regional Lymph Node Status:        Tumor present in regional lymph node(s)     Number of Lymph Nodes with Tumor:          At least: 1     Kassidy Site(s) with Tumor:          7: Subcarinal   Number of Lymph Nodes Examined:        At least: 5    Kassidy Site(s) Examined:         4R: Lower paratracheal          8R: Para-esophageal (below amarilys)          9R: Pulmonary ligament          10R: Hilar          7: Subcarinal  PATHOLOGIC STAGE CLASSIFICATION (pTNM, AJCC 8th Edition)  pT Category:      pT1b  pN Category:      pN2  Representative Blocks:      Normal Block: A8       Tumor Block: A3     Addendum Diagnosis   PD-L1 22C3  by Immunohistochemistry with Interpretation, pembrolizumab   Block used:  A3   Interpretation: Low Expression   Tumor Proportion Score (TPS): 5 %     Addendum Diagnosis   TEST: Focused Solid Tumor DNA/RNA Panel   SPECIMEN: FFPE, LUNG, RIGHT LOWER LOBE, [id number] A3   DISEASE DIAGNOSIS: Adenocarcinoma   Estimated Tumor Content: 40%   COLLECTION DATE: 9/13/2023   RECEIVED DATE: 9/22/2023   REPORT DATE: 09/27/2023     MICROSATELLITE STATUS: Microsatellite Instability-High (MSI-H) is NOT DETECTED.     DISEASE ASSOCIATED GENOMIC FINDINGS:   KRAS p.G12C (NM_033360 c.34G>T)   TP53 p.R249W (NM_000546 c.745A>T)     Surgical Pathology (reviewed in [facility])  Accession #: [id number]  Date of Procedure:  6/25/2009  Pathologist:  ARIS DUNCAN MDDate Reported: 6/25/2009  Submitting Physician: JAILYN MCKENZIE D.O.    FINAL DIAGNOSIS  A.  RIGHT AXILLARY LYMPH NODE, [id number] (6/3/09):HODGKIN'S LYMPHOMA NODULAR SCLEROSIS TYPE.    Microscopic Description: Sections show lymph node with partial involvement by Hodgkin's Lymphoma.  Thereare syncytia of Sacha-Andres cells in an appropriate mixed cellularbackground and focal sclerosis. Immunostain for LCA, fascin, CD15, CD20, CD30, and CD3 are consistent withHodgkin's Lymphoma.       Assessment/Plan      Fatmata Plummer is a 69 y.o. female with prior stage IA Hodgkin lymphoma (R axilla) treated in 2009 by Dr. Brar, severe COPD requiring oxygen ,and recent diagnosis of pT1bN2 adenocarcinoma of the Right lower lobe, surgically resected 9/13/2023 by Dr. Cho.     She has Stage IIIA lung cancer, which has a recurrence risk of about 50% over 5 years. Adjuvant chemotherapy and immunotherapy is typically recommended to decrease the risk of recurrence. However, she has several medical co-morbidities and decreased performance status (ECOG 3) that put her at higher risk for complications from adjuvant therapy. Extensive conversation at 10/25/2023 visit with shared decision-making for surveillance strategy.    Surveillance imaging is recommended to monitor for disease recurrence with CT Chest every 3-6 months in the first 2 years, then yearly for a total of 5 years.    Today 9/18, I reviewed results of CT chest with which shows stable post-surgical changes in Right upper lobe and small R pleural effusion. No lymphandenopathy.  No new pulmonary nodules.   Abnormalities from pneumonia 6/2024 have resolved.     Continue with surveillance CT chest wo contrast in 6 months.    MRI Brain - no intracranial tumors. Repeat yearly (or sooner if CNS symptoms occur).    March 25, 2025 - CT scan reviewed with pt and her  mild increase noted in multiple mediastinal lymph nodes all remain sub centimeter. We will plan to repeat CT chest in 2 months. If continued progression noted we will obtain PET/CT. Her co-morbidities will limit treatment options. Patient would be interested in treatment should the need arise.      Severe COPD  Following with Pulmonary, starting Trelegy inhaler    She completed Pulmonary Rehab.     Has not yet used Trelegy with consistency. Lack of compliance is due to cost of medication. We discussed possibility of a co-pay assistance program. She will fill out from online. Alternatively rx  may be sent to  [facility].     Acute Dysuria, suspect lower urinary tract infeciton  -Urinalysis with reflex culture  -Nitrofurantoin x 5 days  -March 2025 resolved       ANKIT Marvin-CNP  [facility]  Thoracic + H&N Medical Oncology     I spent a total of 30+ minutes on the date of the service which included preparing to see the patient, face-to-face patient care, completing clinical documentation, obtaining and / or reviewing separately obtained history, counseling and educating the patient/family/caregiver, ordering medications, tests, or procedures, communicating with other healthcare providers (not separately reported), independently interpreting results, not separately reported, and communicating results to the patient/family/caregiver, Name and date of birth verified.

## 2025-03-25 NOTE — PROGRESS NOTES
Patient here for follow up visit adenocarcinoma stage III, previously a patient of Dr Escamilla. Currently under surveillance FUV after scan. Hx COPD.   Comes with 4-5 liters oxygen trying wean sometimes its ok at 3.   Denies N/V/D/C  Labs 3/14/25 for review.   Patient here with  Sidney.     Fall in November and fx ankle in 2 places. Followed by Dr Vargas.    Medications and Allergies reviewed and reconciled this visit.    Follow up per MD request.    Patient reports availability and use of mychart, Reviewed this is a good place to communicate with the team as well as review labs and upcoming orders.      No barriers to education noted, patient agrees to current plan and verbalized understanding using teach back method.

## 2025-04-01 ENCOUNTER — OFFICE VISIT (OUTPATIENT)
Dept: CARDIAC SURGERY | Facility: CLINIC | Age: 70
End: 2025-04-01
Payer: MEDICARE

## 2025-04-01 VITALS
SYSTOLIC BLOOD PRESSURE: 126 MMHG | TEMPERATURE: 98.6 F | DIASTOLIC BLOOD PRESSURE: 60 MMHG | RESPIRATION RATE: 18 BRPM | WEIGHT: 146 LBS | BODY MASS INDEX: 26.87 KG/M2 | HEART RATE: 123 BPM | OXYGEN SATURATION: 96 % | HEIGHT: 62 IN

## 2025-04-01 DIAGNOSIS — C34.91 ADENOCARCINOMA OF RIGHT LUNG (MULTI): Primary | ICD-10-CM

## 2025-04-01 PROCEDURE — 1159F MED LIST DOCD IN RCRD: CPT | Performed by: THORACIC SURGERY (CARDIOTHORACIC VASCULAR SURGERY)

## 2025-04-01 PROCEDURE — 99215 OFFICE O/P EST HI 40 MIN: CPT | Performed by: THORACIC SURGERY (CARDIOTHORACIC VASCULAR SURGERY)

## 2025-04-01 PROCEDURE — 1157F ADVNC CARE PLAN IN RCRD: CPT | Performed by: THORACIC SURGERY (CARDIOTHORACIC VASCULAR SURGERY)

## 2025-04-01 PROCEDURE — 1126F AMNT PAIN NOTED NONE PRSNT: CPT | Performed by: THORACIC SURGERY (CARDIOTHORACIC VASCULAR SURGERY)

## 2025-04-01 PROCEDURE — 3008F BODY MASS INDEX DOCD: CPT | Performed by: THORACIC SURGERY (CARDIOTHORACIC VASCULAR SURGERY)

## 2025-04-01 ASSESSMENT — LIFESTYLE VARIABLES
AUDIT-C TOTAL SCORE: 0
HOW OFTEN DO YOU HAVE SIX OR MORE DRINKS ON ONE OCCASION: NEVER
SKIP TO QUESTIONS 9-10: 1
HOW MANY STANDARD DRINKS CONTAINING ALCOHOL DO YOU HAVE ON A TYPICAL DAY: PATIENT DOES NOT DRINK
HOW OFTEN DO YOU HAVE A DRINK CONTAINING ALCOHOL: NEVER

## 2025-04-01 ASSESSMENT — ENCOUNTER SYMPTOMS
DEPRESSION: 0
LOSS OF SENSATION IN FEET: 0
OCCASIONAL FEELINGS OF UNSTEADINESS: 0

## 2025-04-01 ASSESSMENT — PATIENT HEALTH QUESTIONNAIRE - PHQ9
2. FEELING DOWN, DEPRESSED OR HOPELESS: SEVERAL DAYS
10. IF YOU CHECKED OFF ANY PROBLEMS, HOW DIFFICULT HAVE THESE PROBLEMS MADE IT FOR YOU TO DO YOUR WORK, TAKE CARE OF THINGS AT HOME, OR GET ALONG WITH OTHER PEOPLE: SOMEWHAT DIFFICULT
1. LITTLE INTEREST OR PLEASURE IN DOING THINGS: SEVERAL DAYS
SUM OF ALL RESPONSES TO PHQ9 QUESTIONS 1 AND 2: 2

## 2025-04-01 ASSESSMENT — PAIN SCALES - GENERAL: PAINLEVEL_OUTOF10: 0-NO PAIN

## 2025-04-03 ASSESSMENT — ENCOUNTER SYMPTOMS
ABDOMINAL DISTENTION: 0
NAUSEA: 0
ALLERGIC/IMMUNOLOGIC NEGATIVE: 1
STRIDOR: 0
NEUROLOGICAL NEGATIVE: 1
DIARRHEA: 0
UNEXPECTED WEIGHT CHANGE: 0
ENDOCRINE NEGATIVE: 1
SHORTNESS OF BREATH: 0
VOMITING: 0
FEVER: 0
CONSTIPATION: 0
CHEST TIGHTNESS: 0
PSYCHIATRIC NEGATIVE: 1
APPETITE CHANGE: 0
HEMATOLOGIC/LYMPHATIC NEGATIVE: 1
COUGH: 0
ABDOMINAL PAIN: 0
CHOKING: 0
EYES NEGATIVE: 1
CHILLS: 0
MUSCULOSKELETAL NEGATIVE: 1
WHEEZING: 0
DIAPHORESIS: 0
PALPITATIONS: 0
FATIGUE: 0

## 2025-04-03 NOTE — PROGRESS NOTES
Subjective   Patient ID: Fatmata Plummer is a 69 y.o. female who presents for Annual Exam (Fatmata Plummer presents to the office today for an annual follow up. /).  HPI  68-year-old female with severe COPD oxygen dependent with PFT showing FEV1 of 41% and a DLCO of 31% who was found to have a right lower lobe lung nodule.  She underwent a robotic right lower lobe wedge resection and mediastinal lymphadenectomy.  She recovered very well from it.  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  From her mediastinal lymphadenectomy station 7 lymph nodes were positive for malignancy.  This is a PT1BN2.  Her x-ray today shows good lung expansion.  I discussion with her about next steps and I will refer her to medical oncology for discussion about adjuvant therapy.  I will see her again in 6 months with a CT of the chest.    Update 4/16/2024  She has been doing well continues to use oxygen.  CT scan showing no evidence of recurrence.  Will continue surveillance of her lung cancer.  She has a CT scan already scheduled in 6 months and when to see her with results.    Update 4/1/2025  She is wearing oxygen and she feels a little weaker than usual.  CT scan showing some mildly enlarged mediastinal lymph nodes.  She has been follow-up by oncology.  She is scheduled to have another CT scan in a couple of months.  Will keep an eye on those results.  Otherwise, I will see her in 6 months with a CT of the chest.    Review of Systems   Constitutional:  Negative for appetite change, chills, diaphoresis, fatigue, fever and unexpected weight change.   HENT: Negative.     Eyes: Negative.    Respiratory:  Negative for cough, choking, chest tightness, shortness of breath, wheezing and stridor.    Cardiovascular:  Negative for chest pain, palpitations and leg swelling.   Gastrointestinal:  Negative for abdominal distention, abdominal pain, constipation, diarrhea, nausea and vomiting.   Endocrine: Negative.     Genitourinary: Negative.    Musculoskeletal: Negative.    Skin: Negative.    Allergic/Immunologic: Negative.    Neurological: Negative.    Hematological: Negative.    Psychiatric/Behavioral: Negative.     All other systems reviewed and are negative.      Objective   Physical Exam  Constitutional:       Appearance: Normal appearance.   HENT:      Head: Normocephalic and atraumatic.      Nose: Nose normal.      Mouth/Throat:      Mouth: Mucous membranes are moist.      Pharynx: Oropharynx is clear.   Eyes:      Extraocular Movements: Extraocular movements intact.      Conjunctiva/sclera: Conjunctivae normal.      Pupils: Pupils are equal, round, and reactive to light.   Cardiovascular:      Rate and Rhythm: Normal rate and regular rhythm.      Pulses: Normal pulses.      Heart sounds: Normal heart sounds.   Pulmonary:      Effort: Pulmonary effort is normal. No respiratory distress.      Breath sounds: Normal breath sounds. No stridor. No wheezing, rhonchi or rales.   Chest:      Chest wall: No tenderness.   Abdominal:      General: Abdomen is flat. Bowel sounds are normal.      Palpations: Abdomen is soft.   Musculoskeletal:         General: Normal range of motion.      Cervical back: Normal range of motion and neck supple.   Skin:     General: Skin is warm and dry.      Capillary Refill: Capillary refill takes less than 2 seconds.   Neurological:      General: No focal deficit present.      Mental Status: She is alert and oriented to person, place, and time.         Assessment/Plan   Diagnoses and all orders for this visit:  Adenocarcinoma of right lung (Multi)      Follow-up with CT chest in 6 months    Franky Cho MD  Thoracic and Esophageal Surgery

## 2025-05-15 ENCOUNTER — LAB (OUTPATIENT)
Dept: LAB | Facility: HOSPITAL | Age: 70
End: 2025-05-15
Payer: MEDICARE

## 2025-05-15 DIAGNOSIS — C34.91 ADENOCARCINOMA OF RIGHT LUNG (MULTI): ICD-10-CM

## 2025-05-15 LAB
ALBUMIN SERPL BCP-MCNC: 4.1 G/DL (ref 3.4–5)
ALP SERPL-CCNC: 78 U/L (ref 33–136)
ALT SERPL W P-5'-P-CCNC: 12 U/L (ref 7–45)
ANION GAP SERPL CALC-SCNC: 13 MMOL/L (ref 10–20)
AST SERPL W P-5'-P-CCNC: 12 U/L (ref 9–39)
BILIRUB SERPL-MCNC: 0.2 MG/DL (ref 0–1.2)
BUN SERPL-MCNC: 17 MG/DL (ref 6–23)
CALCIUM SERPL-MCNC: 9.1 MG/DL (ref 8.6–10.3)
CHLORIDE SERPL-SCNC: 101 MMOL/L (ref 98–107)
CO2 SERPL-SCNC: 30 MMOL/L (ref 21–32)
CREAT SERPL-MCNC: 1.08 MG/DL (ref 0.5–1.05)
EGFRCR SERPLBLD CKD-EPI 2021: 56 ML/MIN/1.73M*2
GLUCOSE SERPL-MCNC: 169 MG/DL (ref 74–99)
POTASSIUM SERPL-SCNC: 3.4 MMOL/L (ref 3.5–5.3)
PROT SERPL-MCNC: 7.5 G/DL (ref 6.4–8.2)
SODIUM SERPL-SCNC: 141 MMOL/L (ref 136–145)

## 2025-05-15 PROCEDURE — 80053 COMPREHEN METABOLIC PANEL: CPT

## 2025-05-15 PROCEDURE — 36415 COLL VENOUS BLD VENIPUNCTURE: CPT

## 2025-05-22 ENCOUNTER — HOSPITAL ENCOUNTER (OUTPATIENT)
Dept: RADIOLOGY | Facility: HOSPITAL | Age: 70
Discharge: HOME | End: 2025-05-22
Payer: MEDICARE

## 2025-05-22 DIAGNOSIS — C34.91 ADENOCARCINOMA OF RIGHT LUNG (MULTI): ICD-10-CM

## 2025-05-22 PROCEDURE — 71260 CT THORAX DX C+: CPT

## 2025-05-22 PROCEDURE — 2550000001 HC RX 255 CONTRASTS: Performed by: NURSE PRACTITIONER

## 2025-05-22 RX ADMIN — IOHEXOL 50 ML: 350 INJECTION, SOLUTION INTRAVENOUS at 10:02

## 2025-05-29 ENCOUNTER — OFFICE VISIT (OUTPATIENT)
Dept: HEMATOLOGY/ONCOLOGY | Facility: CLINIC | Age: 70
End: 2025-05-29
Payer: MEDICARE

## 2025-05-29 VITALS
OXYGEN SATURATION: 93 % | DIASTOLIC BLOOD PRESSURE: 75 MMHG | WEIGHT: 140.1 LBS | HEART RATE: 108 BPM | SYSTOLIC BLOOD PRESSURE: 137 MMHG | BODY MASS INDEX: 25.63 KG/M2 | RESPIRATION RATE: 20 BRPM | TEMPERATURE: 98.8 F

## 2025-05-29 DIAGNOSIS — C34.91 ADENOCARCINOMA OF RIGHT LUNG (MULTI): ICD-10-CM

## 2025-05-29 DIAGNOSIS — R93.89 ABNORMAL CT OF THE CHEST: Primary | ICD-10-CM

## 2025-05-29 PROCEDURE — 99214 OFFICE O/P EST MOD 30 MIN: CPT | Performed by: NURSE PRACTITIONER

## 2025-05-29 PROCEDURE — 1159F MED LIST DOCD IN RCRD: CPT | Performed by: NURSE PRACTITIONER

## 2025-05-29 PROCEDURE — 1036F TOBACCO NON-USER: CPT | Performed by: NURSE PRACTITIONER

## 2025-05-29 PROCEDURE — 1126F AMNT PAIN NOTED NONE PRSNT: CPT | Performed by: NURSE PRACTITIONER

## 2025-05-29 ASSESSMENT — PAIN SCALES - GENERAL: PAINLEVEL_OUTOF10: 0-NO PAIN

## 2025-05-29 NOTE — PROGRESS NOTES
[facility]   Thoracic Oncology Follow-up Visit - [place]     Patient ID: Fatmata Plummer is a 69 y.o. female.  Primary Care Provider: Soren Covarrubias DO      Diagnosis: Stage III adenocarcinoma of lung, right (Multi) [C34.91]      Cancer Staging   Adenocarcinoma of lung (Multi)  Staging form: Lung, AJCC 8th Edition  - Pathologic stage from 9/13/2023: Stage IIIA (pT1b, pN2, cM0) - Signed by Olivia Escamilla MD on 11/2/2023     Molecular Studies    PDL 1  5%   NGS KRAS G12c     Oncologic History:  2009 - Stage IA Nodular Sclerosis Hodgkin Lymphoma s/p 4 cycles ABVD and IFRT. Treated by Dr. Mark Brar.    4/2023 - Right Lower Lobe lung nodule noted on lung cancer screening CT scan, growing on follow-up CT 3 months later     9/13/23 Right lower lobe wedge resection by Dr. Cho     Initial Presentation:  10/25/2023 Patient presents in a wheelchair accompanied by her . She is noticeably tired, reports she did not sleep much last night due to anxiety about the visit. She listens and participates, but her  does most of the talking.    They are aware of the lung cancer diagnosis which was reviewed by Dr. Cho and by Dr. Cao. They understood from Dr. Cho that adjuvant therapy (chemo) could be considered, but would have to be weight against her other co-morbidities. They come today for a second opinion.    Prior to surgery she was able to do ADLs in the home and accompany her  for grocery shopping. Since the surgery, she has great difficulty with activity, she feels winded even with short walks inside the house and needs help with dressing and bathing.     PMH: Mrs. Plummer has severe COPD, follows with Pulmonary and requires continuous use of oxygen, 4L/min.    HPI     Interval History:   May 29, 2025  Patient presents with her  for follow-up visit and scan review. She reports she is doing well at this time. She has been trying to decrease her oxygen need,  unchanged. At rest she is able to decrease to 3L. Overall she remains on 4L at rest and 5L with exertion. She denies any fevers, chills or night sweats. No chest pain. No nausea or vomiting. No constipation or diarrhea. No urinary symptoms. No rash. No neuropathy.       Review of Systems:  A review of systems has been completed and are negative for complaints except what is stated in the HPI and/or past medical history      Meds (Current):  Current Medications[1]        Allergies   Allergen Reactions    Bupropion Other and Unknown    Buspirone Other    Cilostazol Unknown    Latex Other     BLISTER    Pseudoephedrine Other and Unknown    Nsaids (Non-Steroidal Anti-Inflammatory Drug) Palpitations       Objective   Vital Signs  /75 (BP Location: Right arm, Patient Position: Sitting, BP Cuff Size: Small adult)   Pulse 108   Temp 37.1 °C (98.8 °F) (Temporal)   Resp 20   Wt 63.6 kg (140 lb 1.6 oz)   SpO2 93%   BMI 25.63 kg/m²     Wt Readings from Last 5 Encounters:   25 63.6 kg (140 lb 1.6 oz)   25 66.2 kg (146 lb)   25 63.9 kg (140 lb 14 oz)   24 68 kg (150 lb)   24 70.5 kg (155 lb 6.8 oz)         Physical Exam:  ECO  Pain: 0  Constitutional: Well developed, awake/alert/oriented x3, no distress, alert and cooperative  Eyes: PER. sclera anicteric  ENMT: Oral mucosa moist, no lesions or thrush identified  Respiratory/Thorax: Breathing is non-labored. Lungs are clear to auscultation bilaterally. No adventitious breath sounds  Cardiovascular: S1-S2. Regular rate and rhythm. No murmurs, rubs, or gallops appreciated  Gastrointestinal: Abdomen soft nontender, nondistended, normal active bowel sounds.  Musculoskeletal: ROM intact, no joint swelling, normal strength  Extremities: normal extremities, no cyanosis, no edema, no clubbing  Lymphatics: no palpable lymphadenopathy  Skin: no rash  Neurologic:  Nonfocal exam. No myoclonus  Psychological: Pleasant, appropriate and easily  engaged       Results:    Lab Results   Component Value Date    GLUCOSE 169 (H) 05/15/2025    CALCIUM 9.1 05/15/2025     05/15/2025    K 3.4 (L) 05/15/2025    CO2 30 05/15/2025     05/15/2025    BUN 17 05/15/2025    CREATININE 1.08 (H) 05/15/2025    MG 2.55 (H) 06/06/2024     Lab Results   Component Value Date    ALT 12 05/15/2025    AST 12 05/15/2025    ALKPHOS 78 05/15/2025    BILITOT 0.2 05/15/2025           Imaging:  CT Chest (+) May 23, 2025 - reviewed with pt and   IMPRESSION:  1.  Postoperative changes involving the right lower lobe similar to previous exam, stable.  2. COPD changes.  3. Right middle lobe pleural thickening laterally slightly more prominent when compared to previous exam. Findings may be due to difference in technique of measurement/slice positioning. Attention in subsequent follow-up studies recommended.  4. Questionable right renal cyst but incompletely characterized and included in the field of view. Correlation with ultrasound findings recommended.  5. Additional detailed nonacute findings as above.      CT Chest March 18, 2025 (reviewed with patient and her )  IMPRESSION:  Adenocarcinoma of the lung and Hodgkin's lymphoma restaging scan, in comparison to prior CT from July 2024:      Mild interval increase in size of multiple mediastinal lymph nodes, although nonenlarged by size criteria, early disease recurrence is not excluded. Short-term follow-up CT chest is recommended in 8-12 weeks. Alternatively, further evaluation with PET-CT could be considered. Additional chronic and incidental findings as detailed    11/28/2023 CT chest wo IV contrast  -post- surgical changes noted in R lower lobe segment  -right paratracheal lymph nodes slightly enlarged      8/14/2023 PET CT  IMPRESSION:  1. Hypermetabolic right lower lobe nodule is highly concerning for  malignancy. Recommend tissue biopsy for further evaluation.  2. No evidence of hypermetabolic lymphadenopathy or  metastatic disease.      Pathology:  Accession #: [id number]            Pathologist:                   KAITLIN PANDEY MD  Date of Procedure:    9/13/2023  Date Received:          9/13/2023  Date Reported           9/20/2023  Submitting Physician:   BROOKE FARNSWORTH MD  Location:                    [facility]  Other External #    Procedures/Addenda Present  FINAL DIAGNOSIS  A.  RIGHT LUNG, LOWER LOBE, PULMONARY WEDGE RESECTION:  -- INVASIVE ACINAR ADENOCARCINOMA (1.8 CM) INVOLVING LUNG PARENCHYMA; SEE NOTE AND SYNOPTIC REPORT.    NOTE: Additional immunostain for PD-L1 and next generation sequencing (NGS) will be performed and reported in addenda.    B.  LEVEL 8 LYMPH NODE, EXCISION:  -- ONE (1) LYMPH NODE NEGATIVE FOR TUMOR.    C.  LEVEL 9 LYMPH NODE, EXCISION:  -- ONE (1) LYMPH NODE NEGATIVE FOR TUMOR.    D.  LEVEL 7 LYMPH NODE, EXCISION:  -- METASTATIC CARCINOMA TO FRAGMENTS OF LYMPH NODE(S).    E.  LEVEL 10 LYMPH NODE, EXCISION:  -- FRAGMENTS OF LYMPH NODE(S) NEGATIVE FOR TUMOR.    F.  LEVEL 4R LYMPH NODE, EXCISION:  -- FRAGMENTS OF LYMPH NODE(S) NEGATIVE FOR TUMOR.    CASE SUMMARY REPORT       SPECIMEN  Procedure:      Wedge resection  Specimen Laterality:      Right  TUMOR  Tumor Focality:      Single focus  Tumor Site:      Lower lobe of lung     Tumor Size     Total Tumor Size (size of entire tumor):      Greatest Dimension  (Centimeters): 1.8 cm  Histologic Type:      Invasive acinar adenocarcinoma  Visceral Pleura Invasion:      Not identified  Direct Invasion of Adjacent Structures:      Not applicable (no adjacent  structures present)  Treatment Effect:      No known presurgical therapy  Lymphovascular Invasion:      Not identified  MARGINS  Margin Status for Invasive Carcinoma:      All margins negative for invasive  carcinoma   Closest Margin(s) to Invasive Carcinoma:        Parenchymal  Margin Status for Non-Invasive Tumor:      Not applicable  REGIONAL LYMPH NODES  Lymph Node(s) from Prior  Procedures:      No known prior lymph node sampling  performed  Regional Lymph Node Status:        Tumor present in regional lymph node(s)     Number of Lymph Nodes with Tumor:          At least: 1     Kassidy Site(s) with Tumor:          7: Subcarinal   Number of Lymph Nodes Examined:        At least: 5    Kassidy Site(s) Examined:         4R: Lower paratracheal          8R: Para-esophageal (below amarilys)          9R: Pulmonary ligament          10R: Hilar          7: Subcarinal  PATHOLOGIC STAGE CLASSIFICATION (pTNM, AJCC 8th Edition)  pT Category:      pT1b  pN Category:      pN2  Representative Blocks:      Normal Block: A8       Tumor Block: A3     Addendum Diagnosis   PD-L1 22C3  by Immunohistochemistry with Interpretation, pembrolizumab   Block used:  A3   Interpretation: Low Expression   Tumor Proportion Score (TPS): 5 %     Addendum Diagnosis   TEST: Focused Solid Tumor DNA/RNA Panel   SPECIMEN: FFPE, LUNG, RIGHT LOWER LOBE, [id number] A3   DISEASE DIAGNOSIS: Adenocarcinoma   Estimated Tumor Content: 40%   COLLECTION DATE: 9/13/2023   RECEIVED DATE: 9/22/2023   REPORT DATE: 09/27/2023     MICROSATELLITE STATUS: Microsatellite Instability-High (MSI-H) is NOT DETECTED.     DISEASE ASSOCIATED GENOMIC FINDINGS:   KRAS p.G12C (NM_033360 c.34G>T)   TP53 p.R249W (NM_000546 c.745A>T)     Surgical Pathology (reviewed in [facility])  Accession #: [id number]  Date of Procedure:  6/25/2009  Pathologist:  Santos ESPINOSA Reported: 6/25/2009  Submitting Physician: JAILYN MCKENZIE D.O.    FINAL DIAGNOSIS  A.  RIGHT AXILLARY LYMPH NODE, [id number] (6/3/09):HODGKIN'S LYMPHOMA NODULAR SCLEROSIS TYPE.    Microscopic Description: Sections show lymph node with partial involvement by Hodgkin's Lymphoma.  Thereare syncytia of Sacha-Andres cells in an appropriate mixed cellularbackground and focal sclerosis. Immunostain for LCA, fascin, CD15, CD20, CD30, and CD3 are consistent withHodgkin's Lymphoma.      Assessment/Plan      Fatmata  Elian is a 69 y.o. female with prior stage IA Hodgkin lymphoma (R axilla) treated in 2009 by Dr. Brar, severe COPD requiring oxygen ,and recent diagnosis of pT1bN2 adenocarcinoma of the Right lower lobe, surgically resected 9/13/2023 by Dr. Cho.     She has Stage IIIA lung cancer, which has a recurrence risk of about 50% over 5 years. Adjuvant chemotherapy and immunotherapy is typically recommended to decrease the risk of recurrence. However, she has several medical co-morbidities and decreased performance status (ECOG 3) that put her at higher risk for complications from adjuvant therapy. Extensive conversation at 10/25/2023 visit with shared decision-making for surveillance strategy.    Surveillance imaging is recommended to monitor for disease recurrence with CT Chest every 3-6 months in the first 2 years, then yearly for a total of 5 years.    Today 9/18, I reviewed results of CT chest with which shows stable post-surgical changes in Right upper lobe and small R pleural effusion. No lymphandenopathy.  No new pulmonary nodules.   Abnormalities from pneumonia 6/2024 have resolved.     Continue with surveillance CT chest wo contrast in 6 months.    MRI Brain - no intracranial tumors. Repeat yearly (or sooner if CNS symptoms occur).    March 25, 2025 - CT scan reviewed with pt and her  mild increase noted in multiple mediastinal lymph nodes all remain sub centimeter. We will plan to repeat CT chest in 2 months. If continued progression noted we will obtain PET/CT. Her co-morbidities will limit treatment options. Patient would be interested in treatment should the need arise.      May 29, 2025 - repeat CT in 3 months. After pt summer vacation.   Probable renal cyst - ultrasound ordered     Severe COPD  Following with Pulmonary, starting Trelegy inhaler    She completed Pulmonary Rehab.     Has not yet used Trelegy with consistency. Lack of compliance is due to cost of medication. We discussed possibility  of a co-pay assistance program. She will fill out from online. Alternatively rx may be sent to  [facility].     Acute Dysuria, suspect lower urinary tract infeciton  -Urinalysis with reflex culture  -Nitrofurantoin x 5 days  -March 2025 resolved       Spencer Rea, APRN-CNP  [facility]  Thoracic + H&N Medical Oncology     I spent a total of 30+ minutes on the date of the service which included preparing to see the patient, face-to-face patient care, completing clinical documentation, obtaining and / or reviewing separately obtained history, counseling and educating the patient/family/caregiver, ordering medications, tests, or procedures, communicating with other healthcare providers (not separately reported), independently interpreting results, not separately reported, and communicating results to the patient/family/caregiver, Name and date of birth verified.                [1]   Current Outpatient Medications:     acetaminophen-codeine (Tylenol w/ Codeine #3) 300-30 mg tablet, Take 1 tablet by mouth every 6 hours if needed., Disp: , Rfl:     albuterol 2.5 mg /3 mL (0.083 %) nebulizer solution, Take 3 mL (2.5 mg) by nebulization every 4 hours if needed for wheezing., Disp: , Rfl:     albuterol 90 mcg/actuation aerosol powdr breath activated inhaler, Inhale 2 puffs every 4 hours if needed for wheezing or shortness of breath., Disp: , Rfl:     atorvastatin (Lipitor) 20 mg tablet, Take 1 tablet (20 mg) by mouth once daily., Disp: , Rfl:     BLACK COHOSH ORAL, Take 40 mg by mouth once daily in the morning., Disp: , Rfl:     diphenhydrAMINE (BENADryl) 25 mg capsule, Take 2 capsules (50 mg) by mouth once daily at bedtime., Disp: , Rfl:     diphenhydrAMINE-acetaminophen (Tylenol PM)  mg per tablet, Take 2 tablets by mouth as needed at bedtime for sleep., Disp: , Rfl:     DULoxetine (Cymbalta) 60 mg DR capsule, 1 capsule (60 mg) once every 24 hours., Disp: , Rfl:     folic acid (Folvite) 400  mcg tablet, Take 2.5 tablets (1 mg) by mouth once daily in the morning., Disp: , Rfl:     gabapentin (Neurontin) 300 mg capsule, Take 3 capsules (900 mg) by mouth 2 times a day., Disp: , Rfl:     levothyroxine (Synthroid, Levoxyl) 25 mcg tablet, Take 1 tablet (25 mcg) by mouth once daily in the morning. Take before meals., Disp: , Rfl:     LORazepam (Ativan) 1 mg tablet, Take 2 tablets (2 mg) by mouth once daily in the morning., Disp: , Rfl:     LORazepam (Ativan) 1 mg tablet, Take 1 tablet (1 mg) by mouth once daily at bedtime., Disp: , Rfl:     oxygen (O2) gas therapy, Inhale 4 L/min continuously. ANTONELLA ROTH, Disp: , Rfl:     promethazine (Phenergan) 25 mg tablet, Take 1 tablet (25 mg) by mouth 3 times a day., Disp: , Rfl:     rOPINIRole (Requip) 1 mg tablet, Take 1 tablet (1 mg) by mouth once daily at bedtime., Disp: , Rfl:     tiZANidine (Zanaflex) 4 mg tablet, Take 2 tablets (8 mg) by mouth once daily at bedtime.  1 tablet as needed Orally Three times a day, Disp: , Rfl:     fluticasone-umeclidin-vilanter (Trelegy Ellipta) 200-62.5-25 mcg blister with device, Inhale 1 puff once daily. Rinse mouth with water after use to reduce aftertaste and incidence of candidiasis. Do not swallow. (Patient not taking: Reported on 3/25/2025), Disp: 60 each, Rfl: 3

## 2025-05-29 NOTE — PROGRESS NOTES
Patient here for follow up visit Adenocarcinoma Right lung, hx hodgkin lymphoma. Current surveillance. CT Completed for review today. Labs completed for review as well.      No concerns or complaints noted at this time.   Patient here with Sidney    Medications and Allergies reviewed and reconciled this visit.    Follow up per MD request.    Patient reports availability and use of mychart, Reviewed this is a good place to communicate with the team as well as review labs and upcoming orders.      No barriers to education noted, patient agrees to current plan and verbalized understanding using teach back method.

## 2025-06-17 ASSESSMENT — ENCOUNTER SYMPTOMS
SORE THROAT: 0
CHILLS: 0
NERVOUS/ANXIOUS: 1
SINUS PAIN: 0
SLEEP DISTURBANCE: 0
JOINT SWELLING: 0
ABDOMINAL PAIN: 0
AGITATION: 0
RHINORRHEA: 0
DIZZINESS: 1
BRUISES/BLEEDS EASILY: 1
FEVER: 0
TREMORS: 0
ROS GI COMMENTS: + ACID REFLUX
NAUSEA: 1
PALPITATIONS: 0
UNEXPECTED WEIGHT CHANGE: 0
VOMITING: 0
EYE REDNESS: 0
SINUS PRESSURE: 0
ACTIVITY CHANGE: 0
VOICE CHANGE: 0
TROUBLE SWALLOWING: 0
STRIDOR: 0
ARTHRALGIAS: 0
COUGH: 0
APPETITE CHANGE: 0
HEADACHES: 1
DIARRHEA: 0
EYE ITCHING: 0
CHEST TIGHTNESS: 0

## 2025-06-17 NOTE — PROGRESS NOTES
Patient: Fatmata Plummer    [id number]  : 1955 -- AGE 69 y.o.    Provider: JESUS Bassett     Location [facility]   Service Date: 2025         Department of Medicine  Division of Pulmonary, Critical Care, and Sleep Medicine     [facility]  Follow Up Visit Note    HISTORY OF PRESENT ILLNESS     2024: PCP: Dr. Brand  Sleep: Randal Garland PA-C  Medical Oncology: Dr. Olivia Escamilla / Spencer Rea CNP    HISTORY OF PRESENT ILLNESS   Fatmata Plummer is a 69 y.o. female who presents to a [facility] for a follow up visit with concerns of COPD.   I have independently interviewed and examined the patient in the office and reviewed available records.    DATE OF LAST VISIT: 2024    Current History  On today's visit, She reports she stopped using Trelegy; became too expensive. Was then given Wixela 250 from her PCP and has been on that for approximately the past 6 months.  However, she will typically only use 1 puff typically only 2-3 times a week. 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  I went over her prior inhaler history and from what it sounds like she was formerly on Spiriva which did not bother her mouth and has also been able to tolerate albuterol without any issue.  Her burning mouth syndrome is likely exacerbated by ICS ingredient.  Discussed taking her off of an ICS and purely putting her on a LABA/LAMA.  Also will be referring her to  clinical pharmacy team due to repetitive issues with insurance coverage and cost of inhalers.  She currently states that her shortness of breath is fairly well-controlled and has her good/bad days.  Denies any cough, wheezing, chest tightness, fever, sweats, chills.  Typically utilizing albuterol 1-2 times a week and has not needed her albuterol nebulizer.  Remains on 4 L of supplemental oxygen at all times.  CAT: 21    Prior Visits  & History   07/23/24: Since last visit patient had a hospital admission from 6/3/2024 to 6/7/2024 for community-acquired pneumonia and respiratory failure.  Prior to discharge she was weaned down to her baseline oxygen demand at 4 L at   rest and respiratory therapy found that she required 6 L with ambulation.  Appears she was able to get an oxygen concentrator capable of supplying oxygen at 8 L a minute prior to her discharge home.    Patient was treated with antibiotics  and prednisone.  Most recent CT chest from 7/5/2024 shows resolution of previously seen infiltrates.    She continues to follow with medical oncology; has appointment with Dr. Escamilla upcoming on 9/18/24.    On today's visit, She reports she has been feeling much better since being discharged home from the hospital.  She denies any increased cough, fever, sweats, chills.  She will experience some mild intermittent wheezing.    Denies any chest pain or palpitations.  She has been wearing 4 L of supplemental oxygen at rest and has decreased her oxygen down to 5 L with ambulation.  Will frequently monitor her SpO2 levels at home.    Typically will be in the high 90s while sitting at rest on 4 L.  States that she is planning on going back to pulmonary rehab; thinks she has about 10 more sessions left.  Unfortunately patient once again admits that she is   not taking her Trelegy 200 every day as prescribed.  States that she is only using this maybe 1 day a week.  Patient states that she is not using it correctly due to cost; however, her  who is also sitting next the   patient states that it is not a cost issue.  They are able to get the inhaler for $50; patient just decides not to use it every day.  I once again reiterated and stressed that it is of the utmost importance that patient remain   compliant on her Trelegy in order to properly maintain and control her COPD.  Patient states that she is aware.   CAT: 19    Hospital admission 6/3/24 -  6/7/24 for CAP and respiratory failure  Hospital Course   Fatmata Plummer is a 68 y.o. female with medical history of COPD (on 4L/min of oxygen at home) right lower lung adenocarcinoma, found to have positive lymph nodes and recently referred to medical oncology, carotid artery stenosis, CAD, PAD, HLD, hypothyroidism, migraines, CVA/TIA in 2006 without residual deficits, anxiety with night terrors presenting with SOB, cough, weakness, confusion, multiple falls and abdominal pain that began 3 days prior to admission is found to have acute hypoxic respiratory failure due to pneumonia.   Patient was treated for pneumonia and COPD exacerbation.  She is weaned down to her baseline oxygen demand 4 L a minute at rest however with ambulation she was found to need 8 L.  As she improved she was reevaluated and found to need 6 L with ambulation.  We were able to get an oxygen concentrator capable of supplying oxygen at 8 L a minute prior to discharge.  Pulmonology was consulted and advised to continue prednisone for total of 5 days which she completed in the hospital and to follow-up as an outpatient to repeat a CAT scan to ensure resolution of the consolidation.  On admission the patient was encephalopathic.  She was on 5 sedate of medications these initially were altered and the patient's mentation improved.  After further discussion with the patient and  she has been on these medications for a long time with no significant adverse effects.  The medications were restarted as she takes them at home and the encephalopathy did not return.  The patient and family were educated that some of his medications are on the beers list meaning they need to be monitored when given to the elderly.  They are further advised if she continues to be confused to hold a dose of Ativan or Atarax and call their primary care provider.  Admission she was found to have hypomagnesia which improved during hospitalization.    03/05/24: On today's  visit, She reports she was able to get her previously prescribed Trelegy 200; states that it is expensive at the moment until she reaches her plan deductible.  Once deductible is reached it we will go down to $40 a month.  Unfortunately she has not been using the Trelegy as prescribed.  Very infrequently will use it (approximately once a week or even less).  States that she will use her albuterol inhaler more often.  I once again provided education to her and  about the importance of remaining on daily maintenance therapy in order to control her severe COPD.  States that any inhaler that she uses will trigger her burning mouth syndrome.  Strongly encouraged her to start using the Trelegy as its prescribed and she should start at least seeing some improvement in her day-to-day breathing with proper use.  She is no longer going to pulmonary rehab but was able to complete about 19-20 sessions.  About a month and a half ago she did come down with a lower respiratory tract infection and was treated with an antibiotic by her PCP; symptoms cleared.  They are requesting that I fill out a form for the illuminating company today in regards to her chronic oxygen use; forms filled out today. She is seeing Dr. Escamilla for follow up tomorrow. Just had Chest CT completed yesterday. They asked me to read results to them. I did, but also advised they follow up with Dr. Escamilla as planned to review in more detail from an oncology perspective.  CAT: 28    12/01/23: On today's visit, She reports that ever since having her lung surgery on 9/13/2023, she feels like her shortness of breath has worsened.  She also is noticing some intermittent wheezing and she is now on 4 L of supplemental oxygen at all times.  At my last visit with her I had switched her over to Breztri from Advair discus; she was able to get the Breztri however she sparingly ever uses it.  Maybe a couple times a month on average per patient.  She states that the  aerosol inhaler significantly irritates and exacerbates her burning mouth syndrome.  States that her previous Advair discus powdered inhaler did not seem to bother her mouth as bad.  The albuterol nebulizers and inhalers that she has she also uses sparingly due to mouth pain.     Essentially at this time, she has severe to very severe COPD and is not on any treatment at all; all secondary to burning mouth syndrome.  Due to the fact that her previous powdered Advair was less of a painful experience versus an aerosolized Breztri inhaler; my plan is to get her moved over to powdered Trelegy as triple inhaler therapy.  Dosing will only be once a day and patient seems encouraged that she would be able to tolerate this.  Stressed the importance of needing daily maintenance therapy for management of her COPD; especially after undergoing lung surgery and dealing with lung cancer.     Since last visit, patient has been diagnosed with non-small cell lung cancer.  Underwent a right lower lobe wedge resection on 9/13/2023 confirming the diagnosis.  She is now being followed by both thoracic surgery as well as medical oncology; last saw Dr. Escamilla on 10/25/2023.  Per Dr. Escamilla's last note she has stage IIIa lung cancer and was advised pulmonary rehab due to some significant post wedge resection debility.  Per Dr. Cho note from 10/10/2023; he is planning on seeing her back in 6 months with a repeat CT of her chest at that time.  According to chart review, it appears patient started pulmonary rehab on 11/20/2023 (previously ordered by Dr. Escamilla).  CAT: 25    05/8/23: Since last visit on 10/19/22 with Dr. Guerra, patient completed lung cancer screening CT that was previously ordered. Reviewed those results with her today. [facility] Chest CT scan from 4/27/23 showed a 7mm RLL lung nodule, mild to moderate upper lobe emphysema and some mild subpleural reticulations; suggestive sequela of smoking. Lung RADS 4A. Also recently  "completed an echocardiogram on 12/13/22 which showed preserved EF of 65-70% with LV diastolic dysfunction. Trace tricuspid and mitral regurg. No sign of pulmonary HTN. Last visit with Dr. Guerra, she was prescribed Incruse to pair with her Advair 250. However, appears like she never got the Incruse; states insurance did not cover it. Also is only using her Advair PRN due to \"burning mouth syndrome\". Using it maybe 3x a month currently. Has albuterol nebulizer and inhaler; using those treatments maybe 1-2x a week. She is on 3L of supplemental oxygen constantly. Has been on the oxygen for a little over a year. Wears at night also. She admits to an infrequent cough that is non productive. Admits to frequent wheezing. Denies SOB at rest. Does have BOTELLO; MMRC 3. Denies allergy issues. Denies GERD. Denies chest pain. Denies LLE but does have +orthopnea. Admits to snoring and  has noted abnormal breathing patterns. Has daytime fatigue and will often doze off easily. ESS today was 13.  ACT: 15  CAT: 28     Medical Hx  -COPD  -Chronic Hypoxic Respiratory Failure  -Hx of Hodgkins Lymphoma (2008) s/p lumpectomy right axilla and chemo/radiation right axilla (Dr. Brar)  -HLD  -B12 deficiency  -Night Terrors  -Burning Mouth Syndrome    ** The below are notes from previous visits with Dr. 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 has albuterol prn. Did have breathing treatments in the hospital which did not cause this issue      Denies asthma as a kid     Denies waking up at night short of breath      c/o hoarse voice      Last PFT in 2015--mod obstruction      PMH/PSH: subclavian artery stenosis, lymphoma --s/p chemo/rads in 2015,   FH : adopted so unsure of family hx   SH : quit smoking in 2014, was smoking 1 ppd for 30 years. Used to work in a school as an aide. pets at home    REVIEW OF SYSTEMS     REVIEW OF SYSTEMS  Review of Systems   Constitutional:  Positive for fatigue. Negative for activity change, appetite change, chills, fever and unexpected weight change.        Claims night sweats   HENT:  Positive for congestion. Negative for postnasal drip, rhinorrhea, sinus pressure, sinus pain, sneezing, sore throat, trouble swallowing and voice change.         Denies throat clearing   Eyes:  Negative for redness and itching.   Respiratory:  Positive for shortness of breath and wheezing. Negative for cough, chest tightness and stridor.    Cardiovascular:  Negative for chest pain, palpitations and leg swelling.        Denies orthopnea   Gastrointestinal:  Positive for constipation and nausea. Negative for abdominal pain, diarrhea and vomiting.        + acid reflux   Musculoskeletal:  Positive for back pain and myalgias. Negative for arthralgias and joint swelling.   Skin:  Negative for rash.   Allergic/Immunologic: Negative for immunocompromised state.   Neurological:  Positive for dizziness, weakness and headaches. Negative for tremors.   Hematological:  Bruises/bleeds easily.   Psychiatric/Behavioral:  Negative for agitation and sleep disturbance. The patient is nervous/anxious.         Claims depression   All other systems reviewed and are negative.    ALLERGIES AND MEDICATIONS     ALLERGIES  Allergies   Allergen Reactions    Bupropion Other  and Unknown    Buspirone Other    Cilostazol Unknown    Latex Other     BLISTER    Pseudoephedrine Other and Unknown    Nsaids (Non-Steroidal Anti-Inflammatory Drug) Palpitations       MEDICATIONS  Current Outpatient Medications   Medication Sig Dispense Refill    acetaminophen-codeine (Tylenol w/ Codeine #3) 300-30 mg tablet Take 1 tablet by mouth every 6 hours if needed.      albuterol 2.5 mg /3 mL (0.083 %) nebulizer solution Take 3 mL (2.5 mg) by nebulization every 4 hours if needed for wheezing.      albuterol 90 mcg/actuation aerosol powdr breath activated inhaler Inhale 2 puffs every 4 hours if needed for wheezing or shortness of breath.      atorvastatin (Lipitor) 20 mg tablet Take 1 tablet (20 mg) by mouth once daily.      diphenhydrAMINE (BENADryl) 25 mg capsule Take 2 capsules (50 mg) by mouth once daily at bedtime.      diphenhydrAMINE-acetaminophen (Tylenol PM)  mg per tablet Take 2 tablets by mouth as needed at bedtime for sleep.      DULoxetine (Cymbalta) 60 mg DR capsule 1 capsule (60 mg) once every 24 hours.      folic acid (Folvite) 400 mcg tablet Take 2.5 tablets (1 mg) by mouth once daily in the morning.      gabapentin (Neurontin) 300 mg capsule Take 3 capsules (900 mg) by mouth 2 times a day.      levothyroxine (Synthroid, Levoxyl) 25 mcg tablet Take 1 tablet (25 mcg) by mouth once daily in the morning. Take before meals.      LORazepam (Ativan) 1 mg tablet Take 2 tablets (2 mg) by mouth once daily in the morning.      LORazepam (Ativan) 1 mg tablet Take 1 tablet (1 mg) by mouth once daily at bedtime.      oxygen (O2) gas therapy Inhale 4 L/min continuously. DME IRA      promethazine (Phenergan) 25 mg tablet Take 1 tablet (25 mg) by mouth 3 times a day.      rOPINIRole (Requip) 1 mg tablet Take 1 tablet (1 mg) by mouth once daily at bedtime.      tiZANidine (Zanaflex) 4 mg tablet Take 2 tablets (8 mg) by mouth once daily at bedtime.  1 tablet as needed Orally Three times a day       glycopyrrolate-formoteroL (Bevespi Aerosphere) 9-4.8 mcg HFA aerosol inhaler Inhale 2 Inhalations 2 times a day. 10.7 g 3     No current facility-administered medications for this visit.       PAST HISTORY     PAST MEDICAL HISTORY  She  has a past medical history of Age-related nuclear cataract, bilateral (12/07/2015), Age-related nuclear cataract, right eye (04/01/2016), Allergic (1991), Anxiety (2000), Aphakia, left eye (01/21/2016), COPD (chronic obstructive pulmonary disease) (Multi) (2014), Cortical age-related cataract, bilateral (12/07/2015), Depression (2000), Headache (2009), Hodgkin lymphoma, unspecified, unspecified site (Multi) (12/04/2013), Hypermetropia, bilateral (04/01/2016), Lung cancer (Multi) (9/13/2023@ ,), Neuromuscular disorder (Multi) (2000), On home oxygen therapy, Other chest pain (07/16/2021), Other conditions influencing health status (12/07/2015), Other postherpetic nervous system involvement (07/16/2021), Personal history of other diseases of the respiratory system, Personal history of other infectious and parasitic diseases (07/16/2021), Personal history of pneumonia (recurrent) (08/07/2014), Posterior subcapsular polar age-related cataract, right eye (04/01/2016), Presence of intraocular lens (01/21/2016), and Stroke (Multi) (2000).    PAST SURGICAL HISTORY  Past Surgical History:   Procedure Laterality Date    ABDOMINAL SURGERY  2005    CAROTID ENDARTERECTOMY  2000    CATARACT EXTRACTION  02/24/2016    Cataract Surgery    HYSTERECTOMY  09/18/2005    Hysterectomy    LUNG SURGERY Right 09/13/2023    Right lower lobe wedge resection    STRABISMUS SURGERY  12/07/2015    Strabismus Surgery    TUBAL LIGATION  6/1984       IMMUNIZATION HISTORY  Immunization History   Administered Date(s) Administered    Flu vaccine (IIV4), preservative free *Check age/dose* 11/02/2016, 09/25/2018, 10/04/2019, 10/07/2021    Flu vaccine, quadrivalent, high-dose, preservative free, age 65y+ (FLUZONE)  "10/05/2022, 09/26/2023    Flu vaccine, quadrivalent, no egg protein, age 6 month or greater (FLUCELVAX) 10/05/2020    Flu vaccine, trivalent, preservative free, age 6 months and greater (Fluarix/Fluzone/Flulaval) 09/30/2013    Influenza, injectable, quadrivalent 10/20/2017    Influenza, seasonal, injectable 11/01/2012, 10/02/2015    Moderna COVID-19 vaccine, bivalent, blue cap/gray label *Check age/dose* 11/09/2022    Moderna SARS-CoV-2 Vaccination 03/09/2021, 04/20/2021, 12/06/2021    Novel influenza-H1N1-09, preservative-free 12/22/2009    Pneumococcal conjugate vaccine, 20-valent (PREVNAR 20) 09/26/2023       SOCIAL HISTORY  She  reports that she quit smoking about 11 years ago. Her smoking use included cigarettes. She started smoking about 50 years ago. She has a 39 pack-year smoking history. She has never been exposed to tobacco smoke. She has never used smokeless tobacco. She reports that she does not currently use alcohol. She reports current drug use.     FAMILY HISTORY  Family History   Adopted: Yes       PHYSICAL EXAM     VITAL SIGNS: /71   Pulse 108   Ht 1.575 m (5' 2\")   Wt 63.5 kg (140 lb)   SpO2 98%   PF (!) 4 L/min   BMI 25.61 kg/m²      PREVIOUS WEIGHTS:  Wt Readings from Last 3 Encounters:   06/18/25 63.5 kg (140 lb)   05/29/25 63.6 kg (140 lb 1.6 oz)   04/01/25 66.2 kg (146 lb)       Physical Exam  Vitals reviewed.   Constitutional:       General: She is not in acute distress.     Appearance: Normal appearance. She is not ill-appearing or toxic-appearing.   HENT:      Head: Normocephalic.      Nose: No rhinorrhea.   Cardiovascular:      Rate and Rhythm: Normal rate and regular rhythm.      Heart sounds: Normal heart sounds.   Pulmonary:      Effort: Pulmonary effort is normal. No respiratory distress.      Breath sounds: Normal breath sounds. No stridor. No wheezing, rhonchi or rales.      Comments: Slightly diminished lung sounds all over; otherwise - clear.  Abdominal:      General: " Abdomen is flat.   Musculoskeletal:         General: No swelling. Normal range of motion.      Right lower leg: No edema.      Left lower leg: No edema.   Skin:     General: Skin is warm and dry.      Nails: There is no clubbing.   Neurological:      General: No focal deficit present.      Mental Status: She is alert and oriented to person, place, and time.   Psychiatric:         Mood and Affect: Mood normal.         Behavior: Behavior normal.         Judgment: Judgment normal.       RESULTS/DATA     Pulmonary Function Test Results   PFT  -22: FEV1/FVC: 49, FEV1: 0.91 (41%), FVC: 1.85 (64%), +BD response, GDS99-35: 15%, TLC: 3.69 (83%), DLCO: 31%  -4/14/15: FEV1/FVC: 49, FEV1: 1.49 (61%), FVC: 3.02 (97%), no BD response, UGV80-57: 17%, T.43 (98%), DLCO: 31%      Chest Radiograph   CXR  6/3/24: IMPRESSION: 1.  Increased irregular opacification of the right lung base may represent a combination of atelectasis and infiltrate. Small right effusion not excluded.    Chest CT Scan   Chest CT  25: IMPRESSION: 1.  Postoperative changes involving the right lower lobe similar to previous exam, stable. 2. COPD changes. 3. Right middle lobe pleural thickening laterally slightly more prominent when compared to previous exam. Findings may be due to difference in technique of measurement/slice positioning. Attention in subsequent follow-up studies recommended. 4. Questionable right renal cyst but incompletely characterized and included in the field of view. Correlation with ultrasound findings Recommended. 5. Additional detailed nonacute findings as above.   3/18/25: IMPRESSION: Adenocarcinoma of the lung and Hodgkin's lymphoma restaging scan, in comparison to prior CT from 2024: Mild interval increase in size of multiple mediastinal lymph nodes, although nonenlarged by size criteria, early disease recurrence is not excluded. Short-term follow-up CT chest is recommended in 8-12 weeks. Alternatively, further  evaluation with PET-CT could be considered. Additional chronic and incidental findings as detailed above.   7/5/24: IMPRESSION: Resolution of the interstitial infiltrates and areas of airspace consolidation from the right lung as compared with prior study with resolved mediastinal and right hilar adenopathy. Postoperative change from partial right lower lobe resection with mild cylindrical bronchiectasis in right lower lobe and with linear and discoid areas of scarring and atelectasis in right lower lobe. There is minimal residual right pleural effusion.  6/3/24: IMPRESSION: 1. No evidence of acute pulmonary embolism. 2. New right lung airspace disease concerning for pneumonia. Follow-up imaging post treatment is recommended to exclude underlying neoplastic recurrence. 3. New mildly enlarged right hilar and mediastinal lymph nodes which may be reactive due to the above. 4. Moderate emphysema.  3/4/24: IMPRESSION: 1.  Scar from wedge resection in the right lower lobe appears unchanged 2. Minimal pleural fluid  on the right 3. No interval developing nodules or infiltrates  11/13/23: IMPRESSION: 1. Status post wedge resection right lower lobe superior segment with postoperative changes present consisting of fibrotic stranding, mild bronchial dilatation in the lower lobe and pleural thickening. 2. Right paratracheal lymphadenopathy for which follow-up is recommended. Size of one right paratracheal lymph node has increased from previous examination. 3. Remaining mediastinal lymph nodes unchanged in size. 4. COPD.    Lung CA Screening Chest CT  7/27/23: IMPRESSION: 1. There is a suspicious, growing superior segment right lower lobe lung nodule. Concerning bronchogenic carcinoma. Recommend PET-CT scanning and interventional pulmonology or thoracic surgery consultation. LUNG RADS CATEGORY: Lung-RADS 4X  -4/27/23: 1. There is a 7 mm right lower lobe nodule. Comparison to prior study is limited due to presence of motion and  infiltrate on the prior study, however this nodule is likely new compared to prior study. Recommend short-term follow-up chest CT in 3 months. 2. Mild to moderate upper lung predominant emphysema. Subpleural reticulation in the lungs, likely sequela of smoking. 3. Mild coronary artery calcification. LUNG RADS CATEGORY: Lung-RADS 4A    PET CT  8/14/23: IMPRESSION: 1. Hypermetabolic right lower lobe nodule is highly concerning for malignancy. Recommend tissue biopsy for further evaluation. 2. No evidence of hypermetabolic lymphadenopathy or metastatic disease.    Echocardiogram & Cardiac Studies   Echocardiogram   12/13/22:CONCLUSIONS:  1. Left ventricular systolic function is hyperdynamic with a 65-70% estimated ejection fraction.  2. Spectral Doppler shows an impaired relaxation pattern of left ventricular diastolic filling.  3. Left ventricular cavity size is decreased.  4. There is trace mitral and tricuspid regurgitation.    Labwork & Pathology   Complete Blood Count  Lab Results   Component Value Date    WBC 8.7 06/06/2024    HGB 11.6 (L) 06/06/2024    HCT 38.6 06/06/2024    MCV 92 06/06/2024     06/06/2024     Peripheral Eosinophil Count:   Eosinophils Absolute   Date Value   06/03/2024 0.09 x10*3/uL   09/01/2022 0.17 x10E9/L   04/25/2022 0.08 x10E9/L   10/31/2017 0.15 x10E9/L       Pathology  9/13/23: FINAL DIAGNOSIS   A.  RIGHT LUNG, LOWER LOBE, PULMONARY WEDGE RESECTION:   -- INVASIVE ACINAR ADENOCARCINOMA (1.8 CM) INVOLVING LUNG PARENCHYMA   B.  LEVEL 8 LYMPH NODE, EXCISION:  -- ONE (1) LYMPH NODE NEGATIVE FOR TUMOR.  C.  LEVEL 9 LYMPH NODE, EXCISION:  -- ONE (1) LYMPH NODE NEGATIVE FOR TUMOR.  D.  LEVEL 7 LYMPH NODE, EXCISION:  -- METASTATIC CARCINOMA TO FRAGMENTS OF LYMPH NODE(S).  E.  LEVEL 10 LYMPH NODE, EXCISION:  -- FRAGMENTS OF LYMPH NODE(S) NEGATIVE FOR TUMOR.  F.  LEVEL 4R LYMPH NODE, EXCISION:  -- FRAGMENTS OF LYMPH NODE(S) NEGATIVE FOR TUMOR.  Bronchoscopy & Sputum Cultures        ASSESSMENT/PLAN     Ms. Plummer is a 69 y.o. female; was referred to the [facility] for follow up of COPD and hypoxic respiratory failure.    Problem List and Orders  Diagnoses and all orders for this visit:  Chronic obstructive pulmonary disease, unspecified COPD type (Multi)  -     Complete Pulmonary Function Test Pre/Post Bronchodilator (Spirometry Pre/Post/DLCO/Lung Volumes); Future  -     albuterol 2.5 mg /3 mL (0.083 %) nebulizer solution 3 mL  -     albuterol 90 mcg/actuation inhaler 1 puff  -     Exhaled Nitric Oxide (FeNO); Future  -     Pulmonary Stress Test (6 Min. Walk); Future  -     Referral to Clinical Pharmacy; Future  -     glycopyrrolate-formoteroL (Bevespi Aerosphere) 9-4.8 mcg HFA aerosol inhaler; Inhale 2 Inhalations 2 times a day.  Chronic respiratory failure with hypoxia  Adenocarcinoma of right lung (Multi)  Burning mouth syndrome    Assessment and Plan / Recommendations:  1. Severe COPD: Most recent PFT from 8/29/22 showing GOLD 3 Severe COPD with FEV1 of 41%, +BD response and DLCO at 31%. 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 Was also previously prescribed Incruse for triple therapy; but not covered by insurance. Infrequent nonproductive cough with frequent wheezing. Was able to get Trelegy 200 but very infrequently uses it. Provided more education to her today about the importance of compliance with daily maintenance inhaler therapy. Had a hospital admission 6/2024 for CAP and COPD exacerbation.   -Ordering updated full PFT, FeNO  -Stop Wixela 250 (only takes it 2-3x a week as once a day; aggravates her burning mouth syndrome)  -Formerly on Trelegy; also was non-compliant and bothered her mouth. Became too expensive  -Continue Albuterol Nebs & HFA PRN   -Completed about 19-20 sessions of pulmonary rehab; thinking about going back  -Start Bevespi; 2 puffs BID (will move away from ICS products since she has  formerly tolerated Spiriva and albuterol without difficulty. Would rather have her compliant on a daily maintenance inhaler)  -Referral to  clinical pharmacy team for aid in inhaler coverage/management     2. Chronic Hypoxic Respiratory Failure: Was admitted for a COPD exacerbation 4/2022 and was discharged on 2L supplemental oxygen.   -Since her lung surgery; she is now on 4 L of supplemental oxygen at all times.  -At hospital admission on 6/3/24; RT evaluated her and found that she requires 4L rest; 6L exertion. Currently has POC capable of going up to 8L.  -Form filled out today (3/5/24) for [employer]  -Ordering updated 6MWT to determine current oxygen needs     3. NSCLC: Patient completed a lung cancer screening chest CT on 4/27/2023 which showed a 7 mm right lower lobe nodule; surrounding this nodularity appeared GGO; very inflammatory looking. Lung RADS 4A; advising 3-month follow-up. Most recent Chest CT from 3/4/24 shows stable findings without concern of new nodules.  -Diagnosed with lung cancer on 9/13/2023 after having a right lower lobe wedge resection  -Continue to follow with thoracic surgery  -Continue to follow with medical oncology  -Most recent chest CT completed 5/22/25; has repeat CT scheduled 9/11/25 as ordered by oncology     4. Suspected BLAKE: Patient admits to frequent snoring and  has noticed abnormal breathing patterns while asleep. Patient admits to significant daytime fatigue and will often doze off easily. ESS today was 13. Has never completed a sleep study.  -Formerly established with sleep medicine; never completed sleep study  -States she has claustrophobia and would never tolerate wearing a mask; not interested in pursuing this any more    5. Burning Mouth Syndrome  -States she will be meeting with dentist who is attempting new treatment for this syndrome  -Symptoms appear to be greatly exacerbated by any ICS product    RTC 3 months    Bry Sykes,  CNP  Associate Pulmonary Nurse Practitioner    *This note was dictated using DRAGON speech recognition software and was corrected for spelling or grammatical errors, but despite proofreading several typographical errors might be present that might affect the meaning of the content.*

## 2025-06-18 ENCOUNTER — OFFICE VISIT (OUTPATIENT)
Dept: PULMONOLOGY | Facility: CLINIC | Age: 70
End: 2025-06-18
Payer: MEDICARE

## 2025-06-18 VITALS
HEART RATE: 108 BPM | HEIGHT: 62 IN | DIASTOLIC BLOOD PRESSURE: 71 MMHG | SYSTOLIC BLOOD PRESSURE: 109 MMHG | OXYGEN SATURATION: 98 % | BODY MASS INDEX: 25.76 KG/M2 | WEIGHT: 140 LBS

## 2025-06-18 DIAGNOSIS — J96.11 CHRONIC RESPIRATORY FAILURE WITH HYPOXIA: ICD-10-CM

## 2025-06-18 DIAGNOSIS — J44.9 CHRONIC OBSTRUCTIVE PULMONARY DISEASE, UNSPECIFIED COPD TYPE (MULTI): Primary | ICD-10-CM

## 2025-06-18 DIAGNOSIS — K14.6 BURNING MOUTH SYNDROME: ICD-10-CM

## 2025-06-18 DIAGNOSIS — C34.91 ADENOCARCINOMA OF RIGHT LUNG (MULTI): ICD-10-CM

## 2025-06-18 PROCEDURE — 99212 OFFICE O/P EST SF 10 MIN: CPT | Performed by: NURSE PRACTITIONER

## 2025-06-18 PROCEDURE — 3008F BODY MASS INDEX DOCD: CPT | Performed by: NURSE PRACTITIONER

## 2025-06-18 PROCEDURE — 1036F TOBACCO NON-USER: CPT | Performed by: NURSE PRACTITIONER

## 2025-06-18 PROCEDURE — 1159F MED LIST DOCD IN RCRD: CPT | Performed by: NURSE PRACTITIONER

## 2025-06-18 PROCEDURE — 99214 OFFICE O/P EST MOD 30 MIN: CPT | Performed by: NURSE PRACTITIONER

## 2025-06-18 PROCEDURE — 1160F RVW MEDS BY RX/DR IN RCRD: CPT | Performed by: NURSE PRACTITIONER

## 2025-06-18 PROCEDURE — 1126F AMNT PAIN NOTED NONE PRSNT: CPT | Performed by: NURSE PRACTITIONER

## 2025-06-18 RX ORDER — ALBUTEROL SULFATE 90 UG/1
1 INHALANT RESPIRATORY (INHALATION) ONCE
OUTPATIENT
Start: 2025-06-18

## 2025-06-18 RX ORDER — GLYCOPYRROLATE AND FORMOTEROL FUMARATE 9; 4.8 UG/1; UG/1
2 AEROSOL, METERED RESPIRATORY (INHALATION) 2 TIMES DAILY
Qty: 10.7 G | Refills: 3 | Status: SHIPPED | OUTPATIENT
Start: 2025-06-18

## 2025-06-18 RX ORDER — ALBUTEROL SULFATE 0.83 MG/ML
3 SOLUTION RESPIRATORY (INHALATION) ONCE
OUTPATIENT
Start: 2025-06-18 | End: 2025-06-18

## 2025-06-18 RX ORDER — FLUTICASONE PROPIONATE AND SALMETEROL 250; 50 UG/1; UG/1
1 POWDER RESPIRATORY (INHALATION) DAILY
COMMUNITY
End: 2025-06-18 | Stop reason: ALTCHOICE

## 2025-06-18 ASSESSMENT — ENCOUNTER SYMPTOMS
WEAKNESS: 1
WHEEZING: 1
BACK PAIN: 1
FATIGUE: 1
MYALGIAS: 1
CONSTIPATION: 1
SHORTNESS OF BREATH: 1

## 2025-06-18 ASSESSMENT — PATIENT HEALTH QUESTIONNAIRE - PHQ9
1. LITTLE INTEREST OR PLEASURE IN DOING THINGS: SEVERAL DAYS
SUM OF ALL RESPONSES TO PHQ9 QUESTIONS 1 & 2: 2
2. FEELING DOWN, DEPRESSED OR HOPELESS: SEVERAL DAYS

## 2025-06-18 ASSESSMENT — PAIN SCALES - GENERAL: PAINLEVEL_OUTOF10: 0-NO PAIN

## 2025-06-18 ASSESSMENT — LIFESTYLE VARIABLES: HOW MANY STANDARD DRINKS CONTAINING ALCOHOL DO YOU HAVE ON A TYPICAL DAY: PATIENT DOES NOT DRINK

## 2025-07-05 ENCOUNTER — APPOINTMENT (OUTPATIENT)
Dept: RADIOLOGY | Facility: HOSPITAL | Age: 70
End: 2025-07-05
Payer: MEDICARE

## 2025-07-05 ENCOUNTER — HOSPITAL ENCOUNTER (EMERGENCY)
Facility: HOSPITAL | Age: 70
Discharge: HOME | End: 2025-07-05
Payer: MEDICARE

## 2025-07-05 VITALS
DIASTOLIC BLOOD PRESSURE: 86 MMHG | SYSTOLIC BLOOD PRESSURE: 135 MMHG | HEIGHT: 62 IN | RESPIRATION RATE: 16 BRPM | OXYGEN SATURATION: 97 % | WEIGHT: 140 LBS | HEART RATE: 76 BPM | TEMPERATURE: 97.9 F | BODY MASS INDEX: 25.76 KG/M2

## 2025-07-05 DIAGNOSIS — S82.831A CLOSED FRACTURE OF DISTAL END OF RIGHT FIBULA, UNSPECIFIED FRACTURE MORPHOLOGY, INITIAL ENCOUNTER: Primary | ICD-10-CM

## 2025-07-05 DIAGNOSIS — S93.04XA ANKLE DISLOCATION, RIGHT, INITIAL ENCOUNTER: ICD-10-CM

## 2025-07-05 PROCEDURE — 27840 TREAT ANKLE DISLOCATION: CPT | Performed by: HEALTH CARE PROVIDER

## 2025-07-05 PROCEDURE — 2500000004 HC RX 250 GENERAL PHARMACY W/ HCPCS (ALT 636 FOR OP/ED): Mod: JZ | Performed by: HEALTH CARE PROVIDER

## 2025-07-05 PROCEDURE — 73600 X-RAY EXAM OF ANKLE: CPT | Mod: RT

## 2025-07-05 PROCEDURE — 29515 APPLICATION SHORT LEG SPLINT: CPT | Performed by: HEALTH CARE PROVIDER

## 2025-07-05 PROCEDURE — 73610 X-RAY EXAM OF ANKLE: CPT | Mod: RIGHT SIDE | Performed by: RADIOLOGY

## 2025-07-05 PROCEDURE — 27810 TREATMENT OF ANKLE FRACTURE: CPT | Mod: RT

## 2025-07-05 PROCEDURE — 73610 X-RAY EXAM OF ANKLE: CPT | Mod: RT

## 2025-07-05 PROCEDURE — 99284 EMERGENCY DEPT VISIT MOD MDM: CPT | Mod: 25

## 2025-07-05 PROCEDURE — 29405 APPL SHORT LEG CAST: CPT | Performed by: HEALTH CARE PROVIDER

## 2025-07-05 PROCEDURE — 99285 EMERGENCY DEPT VISIT HI MDM: CPT | Mod: 25

## 2025-07-05 RX ORDER — HYDROMORPHONE HYDROCHLORIDE 1 MG/ML
1 INJECTION, SOLUTION INTRAMUSCULAR; INTRAVENOUS; SUBCUTANEOUS ONCE
Status: COMPLETED | OUTPATIENT
Start: 2025-07-05 | End: 2025-07-05

## 2025-07-05 RX ORDER — OXYCODONE HYDROCHLORIDE 5 MG/1
5 TABLET ORAL EVERY 6 HOURS PRN
Qty: 12 TABLET | Refills: 0 | Status: SHIPPED | OUTPATIENT
Start: 2025-07-05 | End: 2025-07-08

## 2025-07-05 RX ORDER — MIDAZOLAM HYDROCHLORIDE 1 MG/ML
0.5 INJECTION, SOLUTION INTRAMUSCULAR; INTRAVENOUS ONCE
Status: COMPLETED | OUTPATIENT
Start: 2025-07-05 | End: 2025-07-05

## 2025-07-05 RX ADMIN — HYDROMORPHONE HYDROCHLORIDE 1 MG: 1 INJECTION, SOLUTION INTRAMUSCULAR; INTRAVENOUS; SUBCUTANEOUS at 14:19

## 2025-07-05 RX ADMIN — MIDAZOLAM 0.5 MG: 1 INJECTION INTRAMUSCULAR; INTRAVENOUS at 14:20

## 2025-07-05 ASSESSMENT — PAIN DESCRIPTION - LOCATION: LOCATION: ANKLE

## 2025-07-05 ASSESSMENT — LIFESTYLE VARIABLES
EVER HAD A DRINK FIRST THING IN THE MORNING TO STEADY YOUR NERVES TO GET RID OF A HANGOVER: NO
TOTAL SCORE: 0
HAVE PEOPLE ANNOYED YOU BY CRITICIZING YOUR DRINKING: NO
HAVE YOU EVER FELT YOU SHOULD CUT DOWN ON YOUR DRINKING: NO
EVER FELT BAD OR GUILTY ABOUT YOUR DRINKING: NO

## 2025-07-05 ASSESSMENT — PAIN DESCRIPTION - ORIENTATION: ORIENTATION: RIGHT

## 2025-07-05 ASSESSMENT — PAIN - FUNCTIONAL ASSESSMENT: PAIN_FUNCTIONAL_ASSESSMENT: 0-10

## 2025-07-05 ASSESSMENT — PAIN SCALES - GENERAL: PAINLEVEL_OUTOF10: 7

## 2025-07-05 NOTE — SIGNIFICANT EVENT
Patient sent home with an Ecylinder for [employer] due to not having home tanks available.  informed to call [employer] for tank retrieval.

## 2025-07-05 NOTE — ED TRIAGE NOTES
Patient here for ankle pain Right ankle. States she twisted it while walking inside. No fall. Ecchymosis and swelling to the area. EMS gave 40mcg Fentanyl PTA

## 2025-07-05 NOTE — ED PROVIDER NOTES
HPI   Chief Complaint   Patient presents with    Ankle Pain     Right ankle. States she twisted it while walking inside. No fall. Ecchymosis and swelling to the area. EMS gave 40mcg Fentanyl PTA        CC: right ankle injury  HPI:   68 y.o. female with medical history of COPD (on 4L/min of oxygen at home) right lower lung adenocarcinoma, found to have positive lymph nodes and recently referred to medical oncology, carotid artery stenosis, CAD, PAD, HLD, hypothyroidism, migraines, CVA/TIA in 2006 without residual deficits, anxiety with night terrors presenting acute injury to the right ankle patient states she was walking inside her house when she twisted her right ankle denies hitting her head, denies any right knee pain hip pain    Additional Limitations to History:   External Records Reviewed: I reviewed recent and relevant outside records including   History Obtained From:     Past Medical History: Per HPI  Medications: Reviewed in EMR and with patient  Allergies:  Reviewed in EMR  Past Surgical History:   Social History:     ------------------------------------------------------------------------------------------------------  Physical Exam:  --Vital signs reviewed in nursing triage note, EMR flow sheets, and at patient's bedside  GEN:  A&Ox3, no acute distress, appears comfortable.  Conversational and appropriate.  No confusion or gross mental status changes.  EYES: EOMI, non-injected sclera.  ENT: Moist mucous membranes, no apparent injuries or lesions.   CARDIO: Normal rate and regular rhythm. No murmurs, rubs, or gallops.  2+ equal pulses of the distal extremities.   PULM: Clear to auscultation bilaterally. No rales, rhonchi, or wheezes. Good symmetric chest expansion.  GI: Soft, non-tender, non-distended. No rebound tenderness or guarding.  SKIN: Warm and dry, no rashes or lesions.  MSK: ROM intact the extremities without contractures.   EXT: Right ankle: Mild to moderate soft tissue swelling, ecchymosis  noted over the ankle mortise medial and lateral malleolus region pedal pulses intact, obvious deformity with the foot pointing laterally.  Skin is intact  NEURO: Cranial nerves II-XII grossly intact. Sensation to light touch intact and equal bilaterally in upper and lower extremities.  Symmetric 5/5 strength in upper and lower extremities.  PSYCH: Appropriate mood and behavior, converses and responds appropriately during exam.  -------------------------------------------------------------------------------------------------------        Differential Diagnoses Considered:   Chronic Medical Conditions Significantly Affecting Care:   Diagnostic testing considered: [PERC, D-Dimer, PECARN, etc.]      - I independently interpreted: [CXR, CT, POCUS, etc. including your interpretation]  - Labs notable for     Escalation of Care: Appropriate for   Social Determinants of Health Significantly Affecting Care: [Homelessness, lacking transportation, uninsured, unable to afford medications]  Prescription Drug Consideration: [Antibiotics, antivirals, pain medications, etc.]  Discussion of Management with Other Providers:  I discussed the patient/results with: [admitting team, consultant, radiologist, social work, EPAT, case management, PT/OT, RT, PCP, etc.]      Don Smith PA-C              Patient History   Medical History[1]  Surgical History[2]  Family History[3]  Social History[4]    Physical Exam   ED Triage Vitals [07/05/25 1345]   Temperature Heart Rate Respirations BP   36.6 °C (97.9 °F) 74 16 82/56      Pulse Ox Temp Source Heart Rate Source Patient Position   (!) 93 % Skin Monitor Lying      BP Location FiO2 (%)     Left arm --       Physical Exam      ED Course & MDM   Diagnoses as of 07/06/25 0821   Closed fracture of distal end of right fibula, unspecified fracture morphology, initial encounter   Ankle dislocation, right, initial encounter                 No data recorded     [place] Coma Scale Score: 15 (07/05/25  1346 : Aiden Navarrete RN)                           Medical Decision Making  69-year-old female with mechanical fall resulting in lateral dislocation and fracture of the distal fibula and medial malleolus of her right ankle with ankle mortise disruption the extremity is neurovascularly intact patient was given fentanyl by EMS prior to arrival she was given 1 mg Dilaudid and 0.5 mg of Versed traction, and successful reduction of dislocation, extremity is neurovascularly intact distally post imaging done, she was placed in a sugar-tong and posterior short leg splint patient monitored and now more arousable, she has a wheelchair at home and lives with her  findings were discussed with the on-call orthopedic surgeon and patient is stable for discharge home with close follow-up referral placed patient understands instructions if she notices any discoloration or change of skin color to her foot or if she develops any increased pain in her foot or ankle to return to ED immediately.        Procedure  Orthopaedic Injury Treatment - Lower Extremity    Performed by: Don Smith PA-C  Authorized by: Don Smith PA-C    Consent:     Consent obtained:  Verbal    Consent given by:  Patient    Risks, benefits, and alternatives were discussed: yes      Risks discussed:  Irreducible dislocation and vascular damage  Universal protocol:     Procedure explained and questions answered to patient or proxy's satisfaction: yes      Imaging studies available: yes      Patient identity confirmed:  Verbally with patient  Location:     Location:  Ankle    Ankle injury location:  R ankle    Ankle dislocation type: lateral    Pre-procedure details:     Distal perfusion: normal      Range of motion: reduced    Sedation:     Sedation type:  Moderate sedation  Anesthesia:     Anesthesia method:  None  Procedure details:     Manipulation performed: yes      Ankle reduction method:  Direct traction    Reduction successful: yes       Reduction confirmed with imaging: yes      Immobilization:  Splint    Splint type:  Short leg    Supplies used:  Fiberglass  Post-procedure details:     Neurological function: normal      Distal perfusion: normal      Range of motion: unchanged      Procedure completion:  Tolerated  Splint Application    Performed by: Don Smith PA-C  Authorized by: Don Smith PA-C    Consent:     Consent obtained:  Verbal    Consent given by:  Patient    Risks, benefits, and alternatives were discussed: yes      Risks discussed:  Swelling and pain  Universal protocol:     Procedure explained and questions answered to patient or proxy's satisfaction: yes      Imaging studies available: yes      Patient identity confirmed:  Verbally with patient  Pre-procedure details:     Distal neurologic exam:  Normal    Distal perfusion: distal pulses strong    Procedure details:     Location:  Ankle    Ankle location:  R ankle    Strapping: no      Cast type:  Short leg    Splint type:  Ankle stirrup    Supplies:  Fiberglass    Splint applied by::  Myself, and paramedic    Attestation: Splint applied and adjusted personally by me    Post-procedure details:     Distal neurologic exam:  Normal    Distal perfusion: distal pulses strong      Procedure completion:  Tolerated    Post-procedure imaging: reviewed           Don Smith PA-C  07/05/25 1411       [1]   Past Medical History:  Diagnosis Date    Age-related nuclear cataract, bilateral 12/07/2015    Cataract, nuclear sclerotic, both eyes    Age-related nuclear cataract, right eye 04/01/2016    Age-related nuclear cataract of right eye    Allergic 1991    Anxiety 2000    Aphakia, left eye 01/21/2016    Aphakia of left eye    COPD (chronic obstructive pulmonary disease) (Multi) 2014    Cortical age-related cataract, bilateral 12/07/2015    Cortical age-related cataract of both eyes    Depression 2000    Emphysema of lung (Multi) 2014    Headache 2009    Hodgkin lymphoma,  unspecified, unspecified site (Multi) 12/04/2013    Hodgkin's disease    Hypermetropia, bilateral 04/01/2016    Hyperopia of both eyes with astigmatism and presbyopia    Lung cancer (Multi) 9/13/2023@ ,    Neuromuscular disorder (Multi) 2000    On home oxygen therapy     Other chest pain 07/16/2021    Atypical chest pain    Other conditions influencing health status 12/07/2015    PSC (posterior subcapsular cataract), bilateral    Other postherpetic nervous system involvement 07/16/2021    Post herpetic neuralgia    Personal history of other diseases of the respiratory system     History of chronic obstructive lung disease    Personal history of other infectious and parasitic diseases 07/16/2021    History of herpes zoster    Personal history of pneumonia (recurrent) 08/07/2014    History of pneumonia    Posterior subcapsular polar age-related cataract, right eye 04/01/2016    Posterior subcapsular polar age-related cataract of right eye    Presence of intraocular lens 01/21/2016    Pseudophakia of left eye    Stroke (Multi) 2000   [2]   Past Surgical History:  Procedure Laterality Date    ABDOMINAL SURGERY  2005    CAROTID ENDARTERECTOMY  2000    CATARACT EXTRACTION  02/24/2016    Cataract Surgery    HYSTERECTOMY  09/18/2005    Hysterectomy    LUNG SURGERY Right 09/13/2023    Right lower lobe wedge resection    STRABISMUS SURGERY  12/07/2015    Strabismus Surgery    TUBAL LIGATION  6/1984   [3]   Family History  Adopted: Yes   [4]   Social History  Tobacco Use    Smoking status: Former     Current packs/day: 0.00     Average packs/day: 1 pack/day for 39.0 years (39.0 ttl pk-yrs)     Types: Cigarettes     Start date: 1975     Quit date: 7/29/2014     Years since quitting: 10.9     Passive exposure: Never    Smokeless tobacco: Never   Vaping Use    Vaping status: Never Used   Substance Use Topics    Alcohol use: Not Currently    Drug use: Yes     Comment: THC gummies sometimes        Don Smith PA-C  07/06/25  0819

## 2025-07-07 ENCOUNTER — HOSPITAL ENCOUNTER (OUTPATIENT)
Dept: RADIOLOGY | Facility: CLINIC | Age: 70
Discharge: HOME | End: 2025-07-07
Payer: MEDICARE

## 2025-07-07 ENCOUNTER — APPOINTMENT (OUTPATIENT)
Dept: RADIOLOGY | Facility: HOSPITAL | Age: 70
End: 2025-07-07
Payer: MEDICARE

## 2025-07-07 ENCOUNTER — APPOINTMENT (OUTPATIENT)
Dept: ORTHOPEDIC SURGERY | Facility: CLINIC | Age: 70
End: 2025-07-07
Payer: MEDICARE

## 2025-07-07 ENCOUNTER — OFFICE VISIT (OUTPATIENT)
Dept: ORTHOPEDIC SURGERY | Facility: CLINIC | Age: 70
End: 2025-07-07
Payer: MEDICARE

## 2025-07-07 DIAGNOSIS — S82.851A TRIMALLEOLAR FRACTURE OF ANKLE, CLOSED, RIGHT, INITIAL ENCOUNTER: ICD-10-CM

## 2025-07-07 DIAGNOSIS — S82.851A TRIMALLEOLAR FRACTURE OF ANKLE, CLOSED, RIGHT, INITIAL ENCOUNTER: Primary | ICD-10-CM

## 2025-07-07 PROCEDURE — 99202 OFFICE O/P NEW SF 15 MIN: CPT | Performed by: ORTHOPAEDIC SURGERY

## 2025-07-07 PROCEDURE — 73610 X-RAY EXAM OF ANKLE: CPT | Mod: RIGHT SIDE | Performed by: RADIOLOGY

## 2025-07-07 PROCEDURE — 1159F MED LIST DOCD IN RCRD: CPT | Performed by: ORTHOPAEDIC SURGERY

## 2025-07-07 PROCEDURE — 73610 X-RAY EXAM OF ANKLE: CPT | Mod: RT

## 2025-07-07 PROCEDURE — 99204 OFFICE O/P NEW MOD 45 MIN: CPT | Performed by: ORTHOPAEDIC SURGERY

## 2025-07-07 PROCEDURE — 1125F AMNT PAIN NOTED PAIN PRSNT: CPT | Performed by: ORTHOPAEDIC SURGERY

## 2025-07-07 PROCEDURE — G2211 COMPLEX E/M VISIT ADD ON: HCPCS | Performed by: ORTHOPAEDIC SURGERY

## 2025-07-07 RX ORDER — CEFAZOLIN SODIUM 2 G/50ML
2 SOLUTION INTRAVENOUS ONCE
OUTPATIENT
Start: 2025-07-07 | End: 2025-07-07

## 2025-07-07 RX ORDER — OXYCODONE AND ACETAMINOPHEN 5; 325 MG/1; MG/1
1 TABLET ORAL EVERY 6 HOURS PRN
Qty: 28 TABLET | Refills: 0 | Status: SHIPPED | OUTPATIENT
Start: 2025-07-07 | End: 2025-07-14

## 2025-07-07 ASSESSMENT — PAIN - FUNCTIONAL ASSESSMENT: PAIN_FUNCTIONAL_ASSESSMENT: 0-10

## 2025-07-07 ASSESSMENT — PAIN SCALES - GENERAL: PAINLEVEL_OUTOF10: 8

## 2025-07-07 NOTE — PROGRESS NOTES
Fatmataporter Plummer    CHIEF COMPLAINT:  Right ankle pain      HISTORY OF PRESENT ILLNESS:  This is a 69 y.o. female who presents today with right ankle pain after she tripped and fell at her house on July 4.  She was seen in the [facility] emergency room, found to have an ankle fracture dislocation.  Her ankle was reduced and splinted, and she was instructed to follow-up.    On oxygen - 4L  Occupation: retired  Nicotine (Smoking/Vaping) History: non-smoker  Personal or Family Hx of DVT/PE: No  Metal Allergy: No  Diabetic:   No  Last Hgba1c:   Lab Results   Component Value Date    HGBA1C 5.4 03/14/2025       Assessment/Plan:  1. Trimalleolar fracture of ankle, closed, right, initial encounter (Primary)  Right ankle x-rays ordered and reviewed  - Case Request Operating Room: ORIF, ANKLE; Standing  - Case Request Operating Room: ORIF, ANKLE  - XR ankle right 3+ views; Future  - oxyCODONE-acetaminophen (Percocet) 5-325 mg tablet; Take 1 tablet by mouth every 6 hours if needed for severe pain (7 - 10) (1 tab every 6 hours) for up to 7 days.  Dispense: 28 tablet; Refill: 0      Fatmata and I discussed that her ankle fracture is one that needs surgery, she expressed understanding.  She was resplinted today, and post-splint films demonstrated adequate alignment of the ankle.  We will plan for surgery next week to allow her soft tissue swelling and ecchymosis to improve.  We discussed that this would be an ankle ORIF.    We discussed the surgery in detail, including the expected recovery, outcomes, risks and benefits. They will be non-weight bearing for 6 weeks after surgery. They understand it will take them ~12 months to fully recover from the surgery.     The risks and benefits of surgery were discussed today, including, but not limited to: bleeding ,infection, damage to blood vessels and nerves, nerve pain, wound healing problems, nonunion, malunion, hardware failure, implant failure, blood clots, and incomplete relief of symptoms.  They understood, and agreed to go ahead.     My office will begin scheduling surgery.    Post-op pain medication will be Norco 5/325 mg 1 tab q 4 hrs (#42)  We discussed DVT Prophylaxis, and they will use  mg BID   Pre-operative antibiotics will be Ancef.    Thank you for the opportunity to participate in this patient's care.    Physical Exam:  Well appearing female in no acute distress; Alert and oriented.  Left Lower Extremity:   Grossly intact ROM and strength, no obvious deformity.  Right  Lower Extremity:  Gait Cycle:  Non-ambulatory  Inspection:  Swelling: Yes Redness: No  Ecchymosis: Yes Effusion: Yes    Alignment: no angular deformity  Pain on palpation:  Lateral malleolus and Medial malleolus  ROM: limited by pain  Strength: limited by pain  Neurologic Status:  Sensation to all 4 compartments of lower extremity are grossly intact to light touch today in the office.  Vascular Status:   Tibialis posterior pulse: present  Dorsalis pedis pulse: present  Skin:  Normal    IMAGING:     Imaging was ordered today. Final results and radiologist's interpretation, available in the [facility] health record. Images were reviewed with the patient/family members in the office today. My personal interpretation of the performed imaging is bimalleolar ankle fracture    Jaky Gibson MD

## 2025-07-09 ENCOUNTER — APPOINTMENT (OUTPATIENT)
Dept: RESPIRATORY THERAPY | Facility: HOSPITAL | Age: 70
End: 2025-07-09
Payer: MEDICARE

## 2025-07-10 ENCOUNTER — APPOINTMENT (OUTPATIENT)
Dept: RESPIRATORY THERAPY | Facility: CLINIC | Age: 70
End: 2025-07-10
Payer: MEDICARE

## 2025-07-10 ENCOUNTER — CLINICAL SUPPORT (OUTPATIENT)
Dept: PREADMISSION TESTING | Facility: HOSPITAL | Age: 70
End: 2025-07-10
Payer: MEDICARE

## 2025-07-10 RX ORDER — FLUTICASONE FUROATE, UMECLIDINIUM BROMIDE AND VILANTEROL TRIFENATATE 200; 62.5; 25 UG/1; UG/1; UG/1
1 POWDER RESPIRATORY (INHALATION) DAILY
COMMUNITY

## 2025-07-10 NOTE — CPM/PAT NURSE NOTE
CPM/PAT Nurse Note      Name: Fatmata Plummer (Fatmata Plummer)  /Age: 1955/69 y.o.       Medical History[1]    Surgical History[2]    Patient  reports that she is not currently sexually active and has had partner(s) who are male. She reports using the following methods of birth control/protection: Female Sterilization and None.    Family History[3]    Allergies[4]    Prior to Admission medications    Medication Sig Start Date End Date Taking? Authorizing Provider   acetaminophen-codeine (Tylenol w/ Codeine #3) 300-30 mg tablet Take 1 tablet by mouth every 6 hours if needed. 22   Historical Provider, MD   albuterol 2.5 mg /3 mL (0.083 %) nebulizer solution Take 3 mL (2.5 mg) by nebulization every 4 hours if needed for wheezing. 22   Historical Provider, MD   albuterol 90 mcg/actuation aerosol powdr breath activated inhaler Inhale 2 puffs every 4 hours if needed for wheezing or shortness of breath.    Historical Provider, MD   atorvastatin (Lipitor) 20 mg tablet Take 1 tablet (20 mg) by mouth once daily. 22   Historical Provider, MD   diphenhydrAMINE (BENADryl) 25 mg capsule Take 2 capsules (50 mg) by mouth once daily at bedtime.    Historical Provider, MD   diphenhydrAMINE-acetaminophen (Tylenol PM)  mg per tablet Take 2 tablets by mouth as needed at bedtime for sleep.    Historical Provider, MD   DULoxetine (Cymbalta) 60 mg DR capsule 1 capsule (60 mg) once every 24 hours. 16   Historical Provider, MD   fluticasone-umeclidin-vilanter (Trelegy Ellipta) 200-62.5-25 mcg blister with device Inhale 1 puff once daily.    Historical Provider, MD   folic acid (Folvite) 400 mcg tablet Take 2.5 tablets (1 mg) by mouth once daily in the morning.    Historical Provider, MD   gabapentin (Neurontin) 300 mg capsule Take 3 capsules (900 mg) by mouth 2 times a day.    Historical Provider, MD   glycopyrrolate-formoteroL (Bevespi Aerosphere) 9-4.8 mcg HFA aerosol inhaler Inhale 2 Inhalations 2 times a day.  6/18/25   Bry Sykes APRN-CNP   levothyroxine (Synthroid, Levoxyl) 25 mcg tablet Take 1 tablet (25 mcg) by mouth once daily in the morning. Take before meals.    Historical Provider, MD   LORazepam (Ativan) 1 mg tablet Take 1 tablet (1 mg) by mouth once daily in the morning.  1 tab in am , 0.5 tab nightly    Historical Provider, MD   LORazepam (Ativan) 1 mg tablet Take 1 tablet (1 mg) by mouth once daily at bedtime.    Historical Provider, MD   oxyCODONE (Roxicodone) 5 mg immediate release tablet Take 1 tablet (5 mg) by mouth every 6 hours if needed for severe pain (7 - 10) for up to 3 days. 7/5/25 7/8/25  Don Smith PA-C   oxyCODONE-acetaminophen (Percocet) 5-325 mg tablet Take 1 tablet by mouth every 6 hours if needed for severe pain (7 - 10) (1 tab every 6 hours) for up to 7 days. 7/7/25 7/14/25  Jaky Gibson MD   oxygen (O2) gas therapy Inhale 4 L/min continuously. ANTONELLA ROTH    Historical Provider, MD   promethazine (Phenergan) 25 mg tablet Take 1 tablet (25 mg) by mouth 2 times a day. 6/21/24   Historical Provider, MD   rOPINIRole (Requip) 1 mg tablet Take 1 tablet (1 mg) by mouth once daily at bedtime.    Historical Provider, MD   tiZANidine (Zanaflex) 4 mg tablet Take 2 tablets (8 mg) by mouth once daily at bedtime.  1 tablet as needed Orally Three times a day    Historical Provider, MD JULITA CHANDLER     DASI Risk Score      Flowsheet Row Questionnaire Series Submission from 7/8/2025 in [facility] with Generic Provider Adi   Can you take care of yourself (eat, dress, bathe, or use toilet)?  2.75  filed at 07/08/2025 1235   Can you walk indoors, such as around your house? 1.75  filed at 07/08/2025 1235   Can you walk a block or two on level ground?  0  filed at 07/08/2025 1235   Can you climb a flight of stairs or walk up a hill? 5.5  filed at 07/08/2025 1235   Can you run a short distance? 0  filed at 07/08/2025 1235   Can you do light work around the house like  dusting or washing dishes? 2.7  filed at 07/08/2025 1235   Can you do moderate work around the house like vacuuming, sweeping floors or carrying groceries? 0  filed at 07/08/2025 1235   Can you do heavy work around the house like scrubbing floors or lifting and moving heavy furniture?  0  filed at 07/08/2025 1235   Can you do yard work like raking leaves, weeding or pushing a mower? 0  filed at 07/08/2025 1235   Can you have sexual relations? 0  filed at 07/08/2025 1235   Can you participate in moderate recreational activities like golf, bowling, dancing, doubles tennis or throwing a baseball or football? 0  filed at 07/08/2025 1235   Can you participate in strenous sports like swimming, singles tennis, football, basketball, or skiing? 0  filed at 07/08/2025 1235   DASI SCORE 12.7  filed at 07/08/2025 1235   METS Score (Will be calculated only when all the questions are answered) 4.3  filed at 07/08/2025 1235          Caprini DVT Assessment      Flowsheet Row ED to Hosp-Admission (Discharged) from 6/3/2024 in [address] with Jordin Lopes DO, Janene Garcia MD and Nicole De La Fuente MD   DVT Score (IF A SCORE IS NOT CALCULATING, MUST SELECT A BMI TO COMPLETE) 6 filed at 06/03/2024 1606   BMI (BMI MUST BE CHOSEN) 30 or less filed at 06/03/2024 1606   RETIRED: Current Status COPD, Present cancer or chemotherapy filed at 06/03/2024 1606   RETIRED: Age 60-75 years filed at 06/03/2024 1606          Modified Frailty Index    No data to display       [id number] Stroke Risk Points  Current as of just now        N/A 0 to 9 Points:      Last Change: N/A          The [id number] risk score (Lip LEONID, et al. 2009. © 2010 American College [employer]) quantifies the risk of stroke for a patient with atrial fibrillation. For patients without atrial fibrillation or under the age of 18 this score appears as N/A. Higher score values generally indicate higher risk of stroke.        This score is not  applicable to this patient. Components are not calculated.          Revised Cardiac Risk Index    No data to display       Apfel Simplified Score    No data to display       Risk Analysis Index Results This Encounter    No data found in the last 10 encounters.       Stop Bang Score      Flowsheet Row Questionnaire Series Submission from 7/8/2025 in [facility] OR with Generic Provider Adi   Do you snore loudly? 0  filed at 07/08/2025 1235   Do you often feel tired or fatigued after your sleep? 0  filed at 07/08/2025 1235   Has anyone ever observed you stop breathing in your sleep? 0  filed at 07/08/2025 1235   Do you have or are you being treated for high blood pressure? 0  filed at 07/08/2025 1235   Recent BMI (Calculated) 25.6  filed at 07/08/2025 1235   Is BMI greater than 35 kg/m2? 0=No  filed at 07/08/2025 1235   Age older than 50 years old? 1=Yes  filed at 07/08/2025 1235   Gender - Male 0=No  filed at 07/08/2025 1235          Prodigy: High Risk  Total Score: 11              Prodigy Age Score      Prodigy Previous Opioid Use Score           ARISCAT Score for Postoperative Pulmonary Complications    No data to display       [place] Perioperative Risk for Myocardial Infarction or Cardiac Arrest (LISA)    No data to display         Nurse Plan of Action:     RN screening call complete.  Reviewed allergies, medications and pharmacy, medical, surgical and social history with patient.  Chart updated.             [1]   Past Medical History:  Diagnosis Date    Age-related nuclear cataract, bilateral 12/07/2015    Cataract, nuclear sclerotic, both eyes    Age-related nuclear cataract, right eye 04/01/2016    Age-related nuclear cataract of right eye    Allergic 1991    Anxiety 2000    Aphakia, left eye 01/21/2016    Aphakia of left eye    Cerebral vascular accident (Multi)     COPD (chronic obstructive pulmonary disease) (Multi) 2014    Cortical age-related cataract, bilateral 12/07/2015    Cortical  "age-related cataract of both eyes    Depression 2000    Emphysema of lung (Multi) 2014    GERD (gastroesophageal reflux disease)     History of blood transfusion     after childbirth and with Hodgkins ;lymphoma tx    Hodgkin lymphoma, unspecified, unspecified site (Multi) 12/04/2013    Hodgkin's disease    Hyperlipidemia     Hypermetropia, bilateral 04/01/2016    Hyperopia of both eyes with astigmatism and presbyopia    Hypothyroidism     Lung cancer (Multi) 9/13/2023@ ,    Migraine headache 2009    Neuromuscular disorder (Multi) 2000    On home oxygen therapy     Other chest pain 07/16/2021    Atypical chest pain    Other postherpetic nervous system involvement 07/16/2021    Post herpetic neuralgia    Personal history of other infectious and parasitic diseases 07/16/2021    History of herpes zoster    Personal history of pneumonia (recurrent) 08/07/2014    History of pneumonia    Posterior subcapsular polar age-related cataract, right eye 04/01/2016    Posterior subcapsular polar age-related cataract of right eye    Presence of intraocular lens 01/21/2016    Pseudophakia of left eye    Stroke (Multi) 2000   [2]   Past Surgical History:  Procedure Laterality Date    ABDOMINAL SURGERY  2005    CAROTID ENDARTERECTOMY Left 2002    w/ subclavian stent    CATARACT EXTRACTION Bilateral     2019, 2020    HYSTERECTOMY  09/18/2005    Hysterectomy    LUNG SURGERY Right 09/13/2023    Right lower lobe wedge resection    STRABISMUS SURGERY  12/07/2015    Strabismus Surgery    TUBAL LIGATION  6/1984   [3]   Family History  Adopted: Yes   [4]   Allergies  Allergen Reactions    Bupropion Other     \"Hyper\"    Cilostazol Other     \"Hyper\"    Latex Other     BLISTER-from adhesive dressing post op    Pseudoephedrine Other     \"Hyper\"    Nsaids (Non-Steroidal Anti-Inflammatory Drug) GI Upset     "

## 2025-07-11 ENCOUNTER — PRE-ADMISSION TESTING (OUTPATIENT)
Dept: PREADMISSION TESTING | Facility: HOSPITAL | Age: 70
End: 2025-07-11
Payer: MEDICARE

## 2025-07-11 ENCOUNTER — LAB (OUTPATIENT)
Dept: LAB | Facility: HOSPITAL | Age: 70
End: 2025-07-11
Payer: MEDICARE

## 2025-07-11 ENCOUNTER — APPOINTMENT (OUTPATIENT)
Dept: ORTHOPEDIC SURGERY | Facility: CLINIC | Age: 70
End: 2025-07-11
Payer: MEDICARE

## 2025-07-11 VITALS
BODY MASS INDEX: 25.89 KG/M2 | SYSTOLIC BLOOD PRESSURE: 102 MMHG | RESPIRATION RATE: 22 BRPM | HEIGHT: 62 IN | TEMPERATURE: 97.9 F | WEIGHT: 140.7 LBS | HEART RATE: 101 BPM | OXYGEN SATURATION: 96 % | DIASTOLIC BLOOD PRESSURE: 55 MMHG

## 2025-07-11 DIAGNOSIS — R73.9 HYPERGLYCEMIA, UNSPECIFIED: ICD-10-CM

## 2025-07-11 DIAGNOSIS — R73.9 ELEVATED BLOOD SUGAR: ICD-10-CM

## 2025-07-11 DIAGNOSIS — Z01.818 ENCOUNTER FOR OTHER PREPROCEDURAL EXAMINATION: Primary | ICD-10-CM

## 2025-07-11 DIAGNOSIS — Z01.818 PREOP TESTING: Primary | ICD-10-CM

## 2025-07-11 LAB
ANION GAP SERPL CALC-SCNC: 16 MMOL/L (ref 10–20)
BASOPHILS # BLD AUTO: 0.05 X10*3/UL (ref 0–0.1)
BASOPHILS NFR BLD AUTO: 0.6 %
BUN SERPL-MCNC: 15 MG/DL (ref 6–23)
CALCIUM SERPL-MCNC: 8.7 MG/DL (ref 8.6–10.3)
CHLORIDE SERPL-SCNC: 100 MMOL/L (ref 98–107)
CO2 SERPL-SCNC: 25 MMOL/L (ref 21–32)
CREAT SERPL-MCNC: 1.08 MG/DL (ref 0.5–1.05)
EGFRCR SERPLBLD CKD-EPI 2021: 56 ML/MIN/1.73M*2
EOSINOPHIL # BLD AUTO: 0.17 X10*3/UL (ref 0–0.7)
EOSINOPHIL NFR BLD AUTO: 2 %
ERYTHROCYTE [DISTWIDTH] IN BLOOD BY AUTOMATED COUNT: 13.3 % (ref 11.5–14.5)
EST. AVERAGE GLUCOSE BLD GHB EST-MCNC: 100 MG/DL
GLUCOSE SERPL-MCNC: 50 MG/DL (ref 74–99)
HBA1C MFR BLD: 5.1 % (ref ?–5.7)
HCT VFR BLD AUTO: 33.4 % (ref 36–46)
HGB BLD-MCNC: 10 G/DL (ref 12–16)
IMM GRANULOCYTES # BLD AUTO: 0.08 X10*3/UL (ref 0–0.7)
IMM GRANULOCYTES NFR BLD AUTO: 0.9 % (ref 0–0.9)
LYMPHOCYTES # BLD AUTO: 2.23 X10*3/UL (ref 1.2–4.8)
LYMPHOCYTES NFR BLD AUTO: 26.2 %
MCH RBC QN AUTO: 28.8 PG (ref 26–34)
MCHC RBC AUTO-ENTMCNC: 29.9 G/DL (ref 32–36)
MCV RBC AUTO: 96 FL (ref 80–100)
MONOCYTES # BLD AUTO: 0.7 X10*3/UL (ref 0.1–1)
MONOCYTES NFR BLD AUTO: 8.2 %
NEUTROPHILS # BLD AUTO: 5.29 X10*3/UL (ref 1.2–7.7)
NEUTROPHILS NFR BLD AUTO: 62.1 %
NRBC BLD-RTO: 0 /100 WBCS (ref 0–0)
PLATELET # BLD AUTO: 288 X10*3/UL (ref 150–450)
POTASSIUM SERPL-SCNC: 4 MMOL/L (ref 3.5–5.3)
RBC # BLD AUTO: 3.47 X10*6/UL (ref 4–5.2)
SODIUM SERPL-SCNC: 137 MMOL/L (ref 136–145)
WBC # BLD AUTO: 8.5 X10*3/UL (ref 4.4–11.3)

## 2025-07-11 PROCEDURE — 87081 CULTURE SCREEN ONLY: CPT | Mod: AHULAB | Performed by: PHYSICIAN ASSISTANT

## 2025-07-11 PROCEDURE — 99204 OFFICE O/P NEW MOD 45 MIN: CPT | Performed by: PHYSICIAN ASSISTANT

## 2025-07-11 PROCEDURE — 85025 COMPLETE CBC W/AUTO DIFF WBC: CPT

## 2025-07-11 PROCEDURE — 36415 COLL VENOUS BLD VENIPUNCTURE: CPT

## 2025-07-11 PROCEDURE — 83036 HEMOGLOBIN GLYCOSYLATED A1C: CPT

## 2025-07-11 PROCEDURE — 80048 BASIC METABOLIC PNL TOTAL CA: CPT

## 2025-07-11 RX ORDER — CHLORHEXIDINE GLUCONATE ORAL RINSE 1.2 MG/ML
SOLUTION DENTAL
Qty: 473 ML | Refills: 0 | Status: SHIPPED | OUTPATIENT
Start: 2025-07-11 | End: 2025-07-18 | Stop reason: SINTOL

## 2025-07-11 ASSESSMENT — ENCOUNTER SYMPTOMS
CONSTITUTIONAL NEGATIVE: 1
ALLERGIC/IMMUNOLOGIC NEGATIVE: 1
HEMATOLOGIC/LYMPHATIC NEGATIVE: 1
GASTROINTESTINAL NEGATIVE: 1
NEUROLOGICAL NEGATIVE: 1
CARDIOVASCULAR NEGATIVE: 1
EYES NEGATIVE: 1
ENDOCRINE NEGATIVE: 1
RESPIRATORY NEGATIVE: 1
PSYCHIATRIC NEGATIVE: 1
MUSCULOSKELETAL NEGATIVE: 1

## 2025-07-11 NOTE — CPM/PAT H&P
[facility] Evaluation       Name: Fatmata Plummer (Fatmata Plummer)  /Age: 1955/69 y.o.     In-Person       Chief Complaint: Trimalleolar fracture of ankle, closed, right, initial encounter [S82.851A]     HPI      Date of Consult: 25    Referring Provider: Dr. Gibson     Surgery, Date, and Length: Right Ankle Fracture Open Reduction Internal Fixation; 7/15/25; 130 minutes     Fatmata Plummer  is a 69 year-old female who presents to the [facility] for perioperative risk assessment prior to surgery. Presented with right ankle pain after she tripped and fell at her house on . She was seen in the [facility] emergency room, found to have an ankle fracture dislocation. Her ankle was reduced and splinted, and she was instructed to follow-up.   Xray 25: Bimalleolar right ankle fracture in splint with minimal displacement      This note was created in part upon personal review of patient's medical records.      Patient is scheduled to have Right Ankle Fracture Open Reduction Internal Fixation     Medical History  Past Medical History:   Diagnosis Date    Allergic     used to carry epi pen for bee sting allergy, doesn't any more    Anxiety     Cerebral vascular accident (Multi)     no residual    COPD (chronic obstructive pulmonary disease) (Multi)     Depression     Emphysema of lung (Multi)     GERD (gastroesophageal reflux disease)     uunder control    Herpes zoster 2021    History of blood transfusion     after childbirth and with Hodgkins ;lymphoma tx    Hodgkin lymphoma, unspecified, unspecified site (Multi) 2013    s/p chemo & radiation    Hyperlipidemia     Hypothyroidism     Lung cancer (Multi)     s/p surgery with no fuurthher treatment    Migraine headache     On home oxygen therapy     4 L NC    PAD (peripheral artery disease)     Peripheral neuropathy     chemo induuced - feet            Surgical History  Past Surgical History:   Procedure Laterality Date     CAROTID ENDARTERECTOMY Left 2002    w/ subclavian stent    CATARACT EXTRACTION Bilateral     2019, 2020    DPL ILIAC BYPASS GRAFT RT Left 04/2006    HYSTERECTOMY      LUNG SURGERY Right 09/13/2023    Right lower lobe wedge resection    STRABISMUS SURGERY      TUBAL LIGATION  6/1984             Family history:  Family History   Adopted: Yes        Social history:  Social History     Socioeconomic History    Marital status:      Spouse name: Not on file    Number of children: Not on file    Years of education: Not on file    Highest education level: Not on file   Occupational History    Not on file   Tobacco Use    Smoking status: Former     Current packs/day: 0.00     Average packs/day: 1 pack/day for 39.0 years (39.0 ttl pk-yrs)     Types: Cigarettes     Start date: 1975     Quit date: 7/29/2014     Years since quitting: 10.9     Passive exposure: Never    Smokeless tobacco: Never   Vaping Use    Vaping status: Never Used   Substance and Sexual Activity    Alcohol use: Not Currently    Drug use: Yes     Comment: THC gummies sometimes    Sexual activity: Not Currently     Partners: Male     Birth control/protection: Female Sterilization, None   Other Topics Concern    Not on file   Social History Narrative    Not on file     Social Drivers of Health     Financial Resource Strain: Low Risk  (2/27/2025)    Received from [facility]    Overall Financial Resource Strain (CARDIA)     Difficulty of Paying Living Expenses: Not hard at all   Food Insecurity: No Food Insecurity (2/27/2025)    Received from [facility]    Hunger Vital Sign     Worried About Running Out of Food in the Last Year: Never true     Ran Out of Food in the Last Year: Never true   Transportation Needs: No Transportation Needs (2/27/2025)    Received from [facility]    PRAPARE - Transportation     Lack of Transportation (Medical): No     Lack of Transportation (Non-Medical): No   Physical Activity: Insufficiently Active  (3/15/2025)    Received from [facility]    Exercise Vital Sign     Days of Exercise per Week: 1 day     Minutes of Exercise per Session: 10 min   Stress: Stress Concern Present (5/29/2024)    Received from [facility]    [employer] - Occupational Stress Questionnaire     Feeling of Stress : Rather much   Social Connections: Moderately Isolated (5/29/2024)    Received from [facility]    Social Connection and Isolation Panel [NHANES]     Frequency of Communication with Friends and Family: More than three times a week     Frequency of Social Gatherings with Friends and Family: Twice a week     Attends Taoism Services: Never     Active Member of Clubs or Organizations: No     Attends Club or Organization Meetings: Never     Marital Status:    Intimate Partner Violence: Not on file   Housing Stability: Unknown (2/27/2025)    Received from [facility]    Housing Stability Vital Sign     Unable to Pay for Housing in the Last Year: No     Number of Times Moved in the Last Year: Not on file     Homeless in the Last Year: No        Current Outpatient Medications   Medication Instructions    acetaminophen-codeine (Tylenol w/ Codeine #3) 300-30 mg tablet 1 tablet, Every 6 hours PRN    albuterol 90 mcg/actuation aerosol powdr breath activated inhaler 2 puffs, Every 4 hours PRN    albuterol 2.5 mg, Every 4 hours PRN    atorvastatin (LIPITOR) 20 mg, Daily    chlorhexidine (Peridex) 0.12 % solution 15 ml swish and spit for 30 seconds night prior to surgery and morning of surgery    diphenhydrAMINE-acetaminophen (Tylenol PM)  mg per tablet 2 tablets, Nightly PRN    DULoxetine (Cymbalta) 60 mg DR capsule 1 capsule, Every 24 hours    fluticasone-umeclidin-vilanter (Trelegy Ellipta) 200-62.5-25 mcg blister with device 1 puff, Daily    folic acid (FOLVITE) 1 mg, Every morning    gabapentin (Neurontin) 300 mg capsule 3 capsules, 2 times daily    glycopyrrolate-formoteroL  "(Bevespi Aerosphere) 9-4.8 mcg HFA aerosol inhaler 2 Inhalations, inhalation, 2 times daily    levothyroxine (SYNTHROID, LEVOXYL) 25 mcg, Daily before breakfast    LORazepam (ATIVAN) 1 mg, Every morning    LORazepam (ATIVAN) 1 mg, Nightly    oxyCODONE-acetaminophen (Percocet) 5-325 mg tablet 1 tablet, oral, Every 6 hours PRN    oxygen (O2) 4 L/min, Continuous    promethazine (Phenergan) 25 mg tablet 1 tablet, 2 times daily    rOPINIRole (REQUIP) 1 mg, Nightly    tiZANidine (Zanaflex) 4 mg tablet 2 tablets, Nightly        Visit Vitals  /55   Pulse 101   Temp 36.6 °C (97.9 °F) (Temporal)   Resp 22   Ht 1.575 m (5' 2\") Comment: verbal unable to bear weight   Wt 63.8 kg (140 lb 11.2 oz) Comment: verbal weight  few days ago  per pt   SpO2 92% Comment: on 4/l nc continuosly   BMI 25.73 kg/m²   OB Status Hysterectomy   Smoking Status Former   BSA 1.67 m²           Review of Systems   Constitutional: Negative.    HENT: Negative.     Eyes: Negative.    Respiratory: Negative.          COPD- O2 @ 4L 24hrs - could walk / stairs without issue   Cardiovascular: Negative.         METS 4   +stairs / walk with O2 Without chest pain   Gastrointestinal: Negative.    Endocrine: Negative.    Genitourinary: Negative.    Musculoskeletal: Negative.         Right ankle pain   Skin: Negative.    Allergic/Immunologic: Negative.    Neurological: Negative.    Hematological: Negative.    Psychiatric/Behavioral: Negative.          Physical Exam  Vitals reviewed.   Constitutional:       Appearance: Normal appearance.   HENT:      Head: Normocephalic and atraumatic.      Right Ear: External ear normal.      Left Ear: External ear normal.      Nose: Nose normal.      Mouth/Throat:      Pharynx: Oropharynx is clear.   Eyes:      Extraocular Movements: Extraocular movements intact.      Conjunctiva/sclera: Conjunctivae normal.      Pupils: Pupils are equal, round, and reactive to light.   Cardiovascular:      Rate and Rhythm: Normal rate and " regular rhythm.      Heart sounds: Normal heart sounds.   Pulmonary:      Effort: Pulmonary effort is normal.      Breath sounds: Normal breath sounds.      Comments: +O2  Abdominal:      Palpations: Abdomen is soft.   Musculoskeletal:         General: Normal range of motion.      Cervical back: Normal range of motion and neck supple.   Skin:     General: Skin is warm and dry.   Neurological:      General: No focal deficit present.      Mental Status: She is alert and oriented to person, place, and time.   Psychiatric:         Mood and Affect: Mood normal.         Behavior: Behavior normal.          PAT AIRWAY:   Airway:     Mallampati::  II    Neck ROM::  Full    Lab Results   Component Value Date    WBC 8.5 07/11/2025    HGB 10.0 (L) 07/11/2025    HCT 33.4 (L) 07/11/2025    MCV 96 07/11/2025     07/11/2025      Lab Results   Component Value Date    GLUCOSE 50 (L) 07/11/2025    CALCIUM 8.7 07/11/2025     07/11/2025    K 4.0 07/11/2025    CO2 25 07/11/2025     07/11/2025    BUN 15 07/11/2025    CREATININE 1.08 (H) 07/11/2025      EKG 6/3/24  Normal sinus rhythm  Normal ECG  When compared with ECG of 26-APR-2022 18:35,  No significant change was found      TTE 12/13/22  CONCLUSIONS:   1. Left ventricular systolic function is hyperdynamic with a 65-70% estimated ejection fraction.   2. Spectral Doppler shows an impaired relaxation pattern of left ventricular diastolic filling.   3. Left ventricular cavity size is decreased.   4. There is trace mitral and tricuspid regurgitation.       Patient is scheduled to have Right Ankle Fracture Open Reduction Internal Fixation     Cardiovascular  METS: 4 with O2   RCRI: 1  , 6% Risk of MACE  Caprini: 7    HLD- atorvastatin Continue DOS     Pulmonary:  Stop Bang score is 1 placing patient at low risk for BLAKE  ARISCAT: <26 points, 1.6% risk of in-hospital postoperative pulmonary complication  PRODIGY: Moderate risk for opioid induced respiratory depression      COPD- continue all inhalers / nebulizers     Pulmonary risk assessment Bry Onel:      Neurology    No neurologic diagnosis, however, the patient is at increased risk for perioperative delirium secondary to  age, polypharmacy, renal insufficiency, Patient instructed on and provided cognitive exercises  Patient is at increased risk for perioperative CVA secondary to  previous CVA/TIA, increased age     Renal  Recommendations to avoid nephrotoxic drugs and carefully monitor fluid status to maintain euvolemia. Use dose adjusted medications as needed for the underlying level of renal function.     Endocrinology  Hypothyroid- levothyroxine Continue DOS     Hematology       Patient instructed to ambulate as soon as possible postoperatively to decrease thromboembolic risk.      Initiate mechanical DVT prophylaxis as soon as possible and initiate chemical prophylaxis when deemed safe from a bleeding standpoint post surgery.       VTE prophylaxis per surgical team         Tests ordered in PAT: cbc, bmp,A1c,mrsa   LABS REVIEWED: +anemia H/H 10.0/33.4    Risk assessment complete.  Patient is scheduled for a intermediate surgical risk procedure.        Preoperative medication instructions were provided and reviewed with the patient.  Any additional testing or evaluation was explained to the patient.  Nothing by mouth instructions were discussed and patient's questions were answered prior to conclusion to this encounter.  Patient verbalized understanding of preoperative instructions given in preadmission testing; discharge instructions available in EMR.

## 2025-07-11 NOTE — H&P (VIEW-ONLY)
[facility] Evaluation       Name: Fatmata Plummer (Fatmata Plummer)  /Age: 1955/69 y.o.     In-Person       Chief Complaint: Trimalleolar fracture of ankle, closed, right, initial encounter [S82.851A]     HPI      Date of Consult: 25    Referring Provider: Dr. Gibson     Surgery, Date, and Length: Right Ankle Fracture Open Reduction Internal Fixation; 7/15/25; 130 minutes     Fatmata Plummer  is a 69 year-old female who presents to the [facility] for perioperative risk assessment prior to surgery. Presented with right ankle pain after she tripped and fell at her house on . She was seen in the [facility] emergency room, found to have an ankle fracture dislocation. Her ankle was reduced and splinted, and she was instructed to follow-up.   Xray 25: Bimalleolar right ankle fracture in splint with minimal displacement      This note was created in part upon personal review of patient's medical records.      Patient is scheduled to have Right Ankle Fracture Open Reduction Internal Fixation     Medical History  Past Medical History:   Diagnosis Date    Allergic     used to carry epi pen for bee sting allergy, doesn't any more    Anxiety     Cerebral vascular accident (Multi)     no residual    COPD (chronic obstructive pulmonary disease) (Multi)     Depression     Emphysema of lung (Multi)     GERD (gastroesophageal reflux disease)     uunder control    Herpes zoster 2021    History of blood transfusion     after childbirth and with Hodgkins ;lymphoma tx    Hodgkin lymphoma, unspecified, unspecified site (Multi) 2013    s/p chemo & radiation    Hyperlipidemia     Hypothyroidism     Lung cancer (Multi)     s/p surgery with no fuurthher treatment    Migraine headache     On home oxygen therapy     4 L NC    PAD (peripheral artery disease)     Peripheral neuropathy     chemo induuced - feet            Surgical History  Past Surgical History:   Procedure Laterality Date     CAROTID ENDARTERECTOMY Left 2002    w/ subclavian stent    CATARACT EXTRACTION Bilateral     2019, 2020    DPL ILIAC BYPASS GRAFT RT Left 04/2006    HYSTERECTOMY      LUNG SURGERY Right 09/13/2023    Right lower lobe wedge resection    STRABISMUS SURGERY      TUBAL LIGATION  6/1984             Family history:  Family History   Adopted: Yes        Social history:  Social History     Socioeconomic History    Marital status:      Spouse name: Not on file    Number of children: Not on file    Years of education: Not on file    Highest education level: Not on file   Occupational History    Not on file   Tobacco Use    Smoking status: Former     Current packs/day: 0.00     Average packs/day: 1 pack/day for 39.0 years (39.0 ttl pk-yrs)     Types: Cigarettes     Start date: 1975     Quit date: 7/29/2014     Years since quitting: 10.9     Passive exposure: Never    Smokeless tobacco: Never   Vaping Use    Vaping status: Never Used   Substance and Sexual Activity    Alcohol use: Not Currently    Drug use: Yes     Comment: THC gummies sometimes    Sexual activity: Not Currently     Partners: Male     Birth control/protection: Female Sterilization, None   Other Topics Concern    Not on file   Social History Narrative    Not on file     Social Drivers of Health     Financial Resource Strain: Low Risk  (2/27/2025)    Received from [facility]    Overall Financial Resource Strain (CARDIA)     Difficulty of Paying Living Expenses: Not hard at all   Food Insecurity: No Food Insecurity (2/27/2025)    Received from [facility]    Hunger Vital Sign     Worried About Running Out of Food in the Last Year: Never true     Ran Out of Food in the Last Year: Never true   Transportation Needs: No Transportation Needs (2/27/2025)    Received from [facility]    PRAPARE - Transportation     Lack of Transportation (Medical): No     Lack of Transportation (Non-Medical): No   Physical Activity: Insufficiently Active  (3/15/2025)    Received from [facility]    Exercise Vital Sign     Days of Exercise per Week: 1 day     Minutes of Exercise per Session: 10 min   Stress: Stress Concern Present (5/29/2024)    Received from [facility]    [employer] - Occupational Stress Questionnaire     Feeling of Stress : Rather much   Social Connections: Moderately Isolated (5/29/2024)    Received from [facility]    Social Connection and Isolation Panel [NHANES]     Frequency of Communication with Friends and Family: More than three times a week     Frequency of Social Gatherings with Friends and Family: Twice a week     Attends Restorationism Services: Never     Active Member of Clubs or Organizations: No     Attends Club or Organization Meetings: Never     Marital Status:    Intimate Partner Violence: Not on file   Housing Stability: Unknown (2/27/2025)    Received from [facility]    Housing Stability Vital Sign     Unable to Pay for Housing in the Last Year: No     Number of Times Moved in the Last Year: Not on file     Homeless in the Last Year: No        Current Outpatient Medications   Medication Instructions    acetaminophen-codeine (Tylenol w/ Codeine #3) 300-30 mg tablet 1 tablet, Every 6 hours PRN    albuterol 90 mcg/actuation aerosol powdr breath activated inhaler 2 puffs, Every 4 hours PRN    albuterol 2.5 mg, Every 4 hours PRN    atorvastatin (LIPITOR) 20 mg, Daily    chlorhexidine (Peridex) 0.12 % solution 15 ml swish and spit for 30 seconds night prior to surgery and morning of surgery    diphenhydrAMINE-acetaminophen (Tylenol PM)  mg per tablet 2 tablets, Nightly PRN    DULoxetine (Cymbalta) 60 mg DR capsule 1 capsule, Every 24 hours    fluticasone-umeclidin-vilanter (Trelegy Ellipta) 200-62.5-25 mcg blister with device 1 puff, Daily    folic acid (FOLVITE) 1 mg, Every morning    gabapentin (Neurontin) 300 mg capsule 3 capsules, 2 times daily    glycopyrrolate-formoteroL  "(Bevespi Aerosphere) 9-4.8 mcg HFA aerosol inhaler 2 Inhalations, inhalation, 2 times daily    levothyroxine (SYNTHROID, LEVOXYL) 25 mcg, Daily before breakfast    LORazepam (ATIVAN) 1 mg, Every morning    LORazepam (ATIVAN) 1 mg, Nightly    oxyCODONE-acetaminophen (Percocet) 5-325 mg tablet 1 tablet, oral, Every 6 hours PRN    oxygen (O2) 4 L/min, Continuous    promethazine (Phenergan) 25 mg tablet 1 tablet, 2 times daily    rOPINIRole (REQUIP) 1 mg, Nightly    tiZANidine (Zanaflex) 4 mg tablet 2 tablets, Nightly        Visit Vitals  /55   Pulse 101   Temp 36.6 °C (97.9 °F) (Temporal)   Resp 22   Ht 1.575 m (5' 2\") Comment: verbal unable to bear weight   Wt 63.8 kg (140 lb 11.2 oz) Comment: verbal weight  few days ago  per pt   SpO2 92% Comment: on 4/l nc continuosly   BMI 25.73 kg/m²   OB Status Hysterectomy   Smoking Status Former   BSA 1.67 m²           Review of Systems   Constitutional: Negative.    HENT: Negative.     Eyes: Negative.    Respiratory: Negative.          COPD- O2 @ 4L 24hrs - could walk / stairs without issue   Cardiovascular: Negative.         METS 4   +stairs / walk with O2 Without chest pain   Gastrointestinal: Negative.    Endocrine: Negative.    Genitourinary: Negative.    Musculoskeletal: Negative.         Right ankle pain   Skin: Negative.    Allergic/Immunologic: Negative.    Neurological: Negative.    Hematological: Negative.    Psychiatric/Behavioral: Negative.          Physical Exam  Vitals reviewed.   Constitutional:       Appearance: Normal appearance.   HENT:      Head: Normocephalic and atraumatic.      Right Ear: External ear normal.      Left Ear: External ear normal.      Nose: Nose normal.      Mouth/Throat:      Pharynx: Oropharynx is clear.   Eyes:      Extraocular Movements: Extraocular movements intact.      Conjunctiva/sclera: Conjunctivae normal.      Pupils: Pupils are equal, round, and reactive to light.   Cardiovascular:      Rate and Rhythm: Normal rate and " regular rhythm.      Heart sounds: Normal heart sounds.   Pulmonary:      Effort: Pulmonary effort is normal.      Breath sounds: Normal breath sounds.      Comments: +O2  Abdominal:      Palpations: Abdomen is soft.   Musculoskeletal:         General: Normal range of motion.      Cervical back: Normal range of motion and neck supple.   Skin:     General: Skin is warm and dry.   Neurological:      General: No focal deficit present.      Mental Status: She is alert and oriented to person, place, and time.   Psychiatric:         Mood and Affect: Mood normal.         Behavior: Behavior normal.          PAT AIRWAY:   Airway:     Mallampati::  II    Neck ROM::  Full    Lab Results   Component Value Date    WBC 8.5 07/11/2025    HGB 10.0 (L) 07/11/2025    HCT 33.4 (L) 07/11/2025    MCV 96 07/11/2025     07/11/2025      Lab Results   Component Value Date    GLUCOSE 50 (L) 07/11/2025    CALCIUM 8.7 07/11/2025     07/11/2025    K 4.0 07/11/2025    CO2 25 07/11/2025     07/11/2025    BUN 15 07/11/2025    CREATININE 1.08 (H) 07/11/2025      Lab Results   Component Value Date    HGBA1C 5.1 07/11/2025      EKG 6/3/24  Normal sinus rhythm  Normal ECG  When compared with ECG of 26-APR-2022 18:35,  No significant change was found      TTE 12/13/22  CONCLUSIONS:   1. Left ventricular systolic function is hyperdynamic with a 65-70% estimated ejection fraction.   2. Spectral Doppler shows an impaired relaxation pattern of left ventricular diastolic filling.   3. Left ventricular cavity size is decreased.   4. There is trace mitral and tricuspid regurgitation.       Patient is scheduled to have Right Ankle Fracture Open Reduction Internal Fixation     Cardiovascular  METS: 4 with O2   RCRI: 1  , 6% Risk of MACE  Caprini: 7    HLD- atorvastatin Continue DOS     Pulmonary:  Stop Bang score is 1 placing patient at low risk for BLAKE  ARISCAT: <26 points, 1.6% risk of in-hospital postoperative pulmonary  complication  PRODIGY: Moderate risk for opioid induced respiratory depression     COPD- continue all inhalers / nebulizers     Pulmonary risk assessment Bry Sykes:      Neurology    No neurologic diagnosis, however, the patient is at increased risk for perioperative delirium secondary to  age, polypharmacy, renal insufficiency, Patient instructed on and provided cognitive exercises  Patient is at increased risk for perioperative CVA secondary to  previous CVA/TIA, increased age     Renal  Recommendations to avoid nephrotoxic drugs and carefully monitor fluid status to maintain euvolemia. Use dose adjusted medications as needed for the underlying level of renal function.     Endocrinology  Hypothyroid- levothyroxine Continue DOS     Hematology       Patient instructed to ambulate as soon as possible postoperatively to decrease thromboembolic risk.      Initiate mechanical DVT prophylaxis as soon as possible and initiate chemical prophylaxis when deemed safe from a bleeding standpoint post surgery.       VTE prophylaxis per surgical team         Tests ordered in PAT: cbc, bmp,A1c,mrsa   LABS REVIEWED: +anemia H/H 10.0/33.4    Risk assessment complete.  Patient is scheduled for a intermediate surgical risk procedure.        Preoperative medication instructions were provided and reviewed with the patient.  Any additional testing or evaluation was explained to the patient.  Nothing by mouth instructions were discussed and patient's questions were answered prior to conclusion to this encounter.  Patient verbalized understanding of preoperative instructions given in preadmission testing; discharge instructions available in EMR.

## 2025-07-11 NOTE — PREPROCEDURE INSTRUCTIONS
Medication List            Accurate as of July 11, 2025  9:54 AM. Always use your most recent med list.                acetaminophen-codeine 300-30 mg tablet  Commonly known as: Tylenol w/ Codeine #3  Medication Adjustments for Surgery: Take/Use as prescribed     * albuterol 90 mcg/actuation aerosol powdr breath activated inhaler  Medication Adjustments for Surgery: Take/Use as prescribed     * albuterol 2.5 mg /3 mL (0.083 %) nebulizer solution  Medication Adjustments for Surgery: Take/Use as prescribed     atorvastatin 20 mg tablet  Commonly known as: Lipitor  Medication Adjustments for Surgery: Take/Use as prescribed     Bevespi Aerosphere 9-4.8 mcg HFA aerosol inhaler  Generic drug: glycopyrrolate-formoteroL  Inhale 2 Inhalations 2 times a day.  Medication Adjustments for Surgery: Take/Use as prescribed     diphenhydrAMINE-acetaminophen  mg per tablet  Commonly known as: Tylenol PM  Medication Adjustments for Surgery: Take/Use as prescribed     DULoxetine 60 mg DR capsule  Commonly known as: Cymbalta  Medication Adjustments for Surgery: Take/Use as prescribed     folic acid 400 mcg tablet  Commonly known as: Folvite  Medication Adjustments for Surgery: Do Not take on the morning of surgery     gabapentin 300 mg capsule  Commonly known as: Neurontin  Medication Adjustments for Surgery: Take/Use as prescribed     levothyroxine 25 mcg tablet  Commonly known as: Synthroid, Levoxyl  Medication Adjustments for Surgery: Take on the morning of surgery     * LORazepam 1 mg tablet  Commonly known as: Ativan  Medication Adjustments for Surgery: Take/Use as prescribed     * LORazepam 1 mg tablet  Commonly known as: Ativan  Medication Adjustments for Surgery: Take/Use as prescribed     oxyCODONE-acetaminophen 5-325 mg tablet  Commonly known as: Percocet  Take 1 tablet by mouth every 6 hours if needed for severe pain (7 - 10) (1 tab every 6 hours) for up to 7 days.  Medication Adjustments for Surgery: Take/Use as  prescribed     oxygen gas therapy  Commonly known as: O2  Medication Adjustments for Surgery: Take/Use as prescribed     promethazine 25 mg tablet  Commonly known as: Phenergan  Medication Adjustments for Surgery: Take/Use as prescribed     rOPINIRole 1 mg tablet  Commonly known as: Requip  Medication Adjustments for Surgery: Take/Use as prescribed     tiZANidine 4 mg tablet  Commonly known as: Zanaflex  Medication Adjustments for Surgery: Take/Use as prescribed     Trelegy Ellipta 200-62.5-25 mcg blister with device  Generic drug: fluticasone-umeclidin-vilanter  Medication Adjustments for Surgery: Take/Use as prescribed           * This list has 4 medication(s) that are the same as other medications prescribed for you. Read the directions carefully, and ask your doctor or other care provider to review them with you.                      Incentive Spirometer   You were provided with an incentive spirometer in [facility], please follow the below instructions.    What is an incentive spirometer?  An incentive spirometer is a device used before and after surgery to “exercise” your lungs.  It helps you to take deeper breaths to expand your lungs.  Below is an example of a basic incentive spirometer.  The device you receive may differ slightly but they all function the same.    Why do I need to use an incentive spirometer?  Using your incentive spirometer prepares your lungs for surgery and helps prevent lung problems after surgery.  How do I use my incentive spirometer?  When you're using your incentive spirometer, make sure to breathe through your mouth. If you breathe through your nose, the incentive spirometer won't work properly. You can hold your nose if you have trouble.  If you feel dizzy at any time, stop and rest. Try again at a later time.  Follow the steps below:  Set up your incentive spirometer, expand the flexible tubing and connect to the outlet.  Sit upright in a chair or bed. Hold the incentive spirometer  at eye level.   Put the mouthpiece in your mouth and close your lips tightly around it. Slowly breathe out (exhale) completely.  Breathe in (inhale) slowly through your mouth as deeply as you can. As you take a breath, you will see the piston rise inside the large column. While the piston rises, the indicator should move upwards. It should stay in between the 2 arrows (see Figure).  Try to get the piston as high as you can, while keeping the indicator between the arrows.   If the indicator doesn't stay between the arrows, you're breathing either too fast or too slow.  When you get it as high as you can, hold your breath for 10 seconds, or as long as possible. While you're holding your breath, the piston will slowly fall to the base of the spirometer.  Once the piston reaches the bottom of the spirometer, breathe out slowly through your mouth. Rest for a few seconds.  Repeat 10 times. Try to get the piston to the same level with each breath.  Repeat every hour while awake  You can carefully clean the outside of the mouthpiece with an alcohol wipe or soap and water.          Preoperative Brain Exercises    What are brain exercises?  A brain exercise is any activity that engages your thinking (cognitive) skills.    What types of activities are considered brain exercises?  Jigsaw puzzles, crossword puzzles, word jumble, memory games, word search, and many more.  Many can be found free online or on your phone via a mobile carter.    Why should I do brain exercises before my surgery?  More recent research has shown brain exercise before surgery can lower the risk of postoperative delirium (confusion) which can be especially important for older adults.  Patients who did brain exercises for 5 to 10 hours (total) for the 7-10 days before surgery, cut their risk of postoperative delirium in half up to 1 week after surgery.     Concerning above medication instructions - If medication is normally taken at night continue normal  schedule - do not take night prior and morning of surgery.       Preoperative Deep Breathing Exercises  Why it is important to do deep breathing exercises before my surgery?  Deep breathing exercises strengthen your breathing muscles.  This helps you to recover after your surgery and decreases the chance of breathing complications.  How are the deep breathing exercises done?  Sit straight with your back supported.  Breathe in deeply and slowly through your nose. Your lower rib cage should expand and your abdomen may move forward.  Hold that breath for 3 to 5 seconds.  Breathe out through pursed lips, slowly and completely.  Rest and repeat 10 times every hour while awake.  Rest longer if you become dizzy or lightheaded.      CONTACT SURGEON'S OFFICE IF YOU DEVELOP:  * Fever = 100.4 F   * New respiratory symptoms (e.g. cough, shortness of breath, respiratory distress, sore throat)  * Recent loss of taste or smell  *Flu like symptoms such as headache, fatigue or gastrointestinal symptoms  * You develop any open sores, shingles, burning or painful urination   AND/OR:  * You no longer wish to have the surgery.  * Any other personal circumstances change that may lead to the need to cancel or defer this surgery.  *You were admitted to any hospital within one week of your planned procedure.    SMOKING:  *Quitting smoking can make a huge difference to your health and recovery from surgery.    *If you need help with quitting, call 3-800-QUIT-NOW.    THE DAY OF SURGERY:  *Do not eat any food after midnight the night before surgery/procedure.   *YOU MUST DRINK 14 oz drink clear liquids (i.e. water, black coffee/tea, (no milk or cream) apple juice, and electrolyte drinks (Gatorade)  2 hours before your instructed arrival time to the hospital.  *You may chew gum until  2 hours before your surgery/procedure.    SURGICAL TIME  *You will be contacted between 2 p.m. and 6 p.m. the business day before your surgery with your arrival  time.  *If you haven't received a call by 6pm, call [phone].  *Scheduled surgery times may change and you will be notified if this occurs-check your personal voicemail for any updates.    ON THE MORNING OF SURGERY:  *Wear comfortable, loose fitting clothing.   *Do not use moisturizers, creams, lotions or perfume.  *All jewelry and valuables should be left at home.  *Prosthetic devices such as contact lenses, hearing aids, dentures, eyelash extensions, hairpins and body piercing must be removed before surgery.    BRING WITH YOU:  *Photo ID and insurance card  *Current list of medicines and allergies  *Pacemaker/Defibrillator/Heart stent cards  *CPAP machine and mask  *Slings/splints/crutches  *Copy of your complete Advanced Directive/DHPOA-if applicable  *Neurostimulator implant remote    PARKING AND ARRIVAL:  *Check in at the Main Entrance desk and let them know you are here for surgery.  *You will be directed to the 2nd floor surgical waiting area.    AFTER OUTPATIENT SURGERY:  *A responsible adult MUST accompany you at the time of discharge and stay with you for 24 hours after your surgery.  *You may NOT drive yourself home after surgery.  *You may use a taxi or ride sharing service ([employer], Uber) to return home ONLY if you are accompanied by a friend or family member.  *Instructions for resuming your medications will be provided by your surgeon.       Home Preoperative Antibacterial Shower     What is a home preoperative antibacterial shower?  This shower is a way of cleaning the skin with a germ killing soap before surgery.  The soap contains chlorhexidine, commonly known as CHG.  CHG is a soap for your skin with germ killing ability.  Let your doctor know if you are allergic to chlorhexidine.    Why do I need to take a preoperative antibacterial shower?  Skin is not sterile.  It is best to try to make your skin as free of germs as possible before surgery.  Proper cleansing with a germ killing soap before  surgery can lower the number of germs on your skin.  This helps to reduce the risk of infection at the surgical site.  Following the instructions listed below will help you prepare your skin for surgery.      How do I use the CHG skin cleanser?  Steps:  Begin using your CHG soap five days before your scheduled surgery on ________________________.    Days 1-4 Shower before bed:  Wash your face and genitals with your normal soap and rinse.           2.    Apply the CHG soap to a clean wet washcloth.  Turn the water off or move away                From the water spray to avoid premature rinsing of the CHG soap as you are applying.     3.   Lather your entire body from the neck down.  Do not use on your face or genitals.  4. Pay special attention to the area(s) where your incision(s) will be located unless they are on your face.  Avoid scrubbing your skin too hard.  The important point is to have the CHG soap sit on your skin for 3 minutes.    When the 3 minutes are up, turn on the water and rinse the CHG soap off your body completely.   Pat yourself dry with a clean, freshly-laundered towel.  Dress in clean, freshly laundered night clothes.    Be sure to change bed sheets and blankets at least on the first night of CHG body wash use. May change linens every night of the above protocol for maximum benefit.   Day 5:  Last shower is the morning of surgery: Follow above Instructions.    NOTE:        *Keep CHG soap out of eyes and ear canals   *DO NOT wash with regular soap on your body after you have used the CHG soap solution  *DO NOT apply powders, lotions, or perfume.  *Deodorant may be used days 1-4, BUT NOT the day of surgery    Who should I contact if I have any questions regarding the use of CHG soap?  Call the [facility], Preadmission Testing at [phone] if you have any questions.              Patient Information: Pre-Operative Infection Prevention Measures     Why did I have my  nose, under my arms and groin swabbed?  The purpose of the swab is to identify Staphylococcus aureus inside your nose or on your skin.  The swab was sent to the laboratory for culture.  A positive swab/culture for Staphylococcus aureus is called colonization or carriage.      What is Staphylococcus aureus?  Staphylococcus aureus, also known as “staph”, is a germ found on the skin or in the nose of healthy people.  Sometimes Staphylococcus aureus can get into the body and cause an infection.  This can be minor (such as pimples, boils or other skin problems).  It might also be serious (such as blood infection, pneumonia or a surgical site infection).    What is Staphylococcus aureus colonization or carriage?  Colonization or carriage means that a person has the germ but is not sick from it.  These bacteria can be spread on the hands or when breathing or sneezing.    How is Staphylococcus aureus spread?  It is most often spread by close contact with a person or item that carries it.    What happens if my culture is positive for Staphylococcus aureus?  Your doctor/medical team will use this information to guide any antibiotic treatment which may be necessary.  Regardless of the culture results, we will clean the inside of your nose with a betadine swab just before you have your surgery.      Will I get an infection if I have Staphylococcus aureus in my nose or on my skin?  Anyone can get an infection with Staphylococcus aureus.  However, the best way to reduce your risk of infection is to follow the instructions provided to you for the use of your CHG soap and dental rinse.        Who should I contact if I have any questions?  Call the [facility], Preadmission Testing at [phone] if you have any questions.          Patient Information: Oral/Dental Rinse  **This is a prescription; pick it up at your preferred local pharmacy **  What is oral/dental rinse?   It is a mouthwash. It is a way  of cleaning the mouth with a germ killing solution before your surgery.  The solution contains chlorhexidine, commonly known as CHG.   It is used inside the mouth to kill a bacteria known as Staphylococcus aureus.  Let your doctor know if you are allergic to Chlorhexidine.    Why do I need to use CHG oral/dental rinse?  The CHG oral/dental rinse helps to kill a bacteria in your mouth known a Staphylococcus aureus.     This reduces the risk of infection at the surgical site.      Using your CHG oral/dental rinse  STEPS:  Use your CHG oral/dental rinse after you brush your teeth the night before (at bedtime) and the morning of your surgery.  Follow all directions on your prescription label.    Use the cap on the container to measure 15ml (fill cap to fill line)  Swish (gargle if you can) the mouthwash in your mouth for at least 30 seconds, (do not to swallow) spit out  After you use your CHG rinse, do not rinse your mouth with water, drink or eat.  Please refer to prescription label for the appropriate time to resume oral intake  Dental rinse comes in one size bottle: 473ml ~16oz.  You will have leftover    rinse, discard after this use.    What side effects might I have using the CHG oral/dental rinse?  CHG rinse will stick to plaque on the teeth.  Brush and floss just before use.  Teeth brushing will help avoid staining of plaque during use.    Who should I contact if I have questions about the CHG oral/dental rinse?  Please call [facility], Preadmission Testing at [phone] if you have any questions           Patient Information: Oral/Dental Rinse  **This is a prescription; pick it up at your preferred local pharmacy **  What is oral/dental rinse?   It is a mouthwash. It is a way of cleaning the mouth with a germ killing solution before your surgery.  The solution contains chlorhexidine, commonly known as CHG.   It is used inside the mouth to kill a bacteria known as Staphylococcus  aureus.  Let your doctor know if you are allergic to Chlorhexidine.    Why do I need to use CHG oral/dental rinse?  The CHG oral/dental rinse helps to kill a bacteria in your mouth known a Staphylococcus aureus.     This reduces the risk of infection at the surgical site.      Using your CHG oral/dental rinse  STEPS:  Use your CHG oral/dental rinse after you brush your teeth the night before (at bedtime) and the morning of your surgery.  Follow all directions on your prescription label.    Use the cap on the container to measure 15ml (fill cap to fill line)  Swish (gargle if you can) the mouthwash in your mouth for at least 30 seconds, (do not to swallow) spit out  After you use your CHG rinse, do not rinse your mouth with water, drink or eat.  Please refer to prescription label for the appropriate time to resume oral intake  Dental rinse comes in one size bottle: 473ml ~16oz.  You will have leftover    rinse, discard after this use.    What side effects might I have using the CHG oral/dental rinse?  CHG rinse will stick to plaque on the teeth.  Brush and floss just before use.  Teeth brushing will help avoid staining of plaque during use.    Who should I contact if I have questions about the CHG oral/dental rinse?  Please call [facility], Preadmission Testing at [phone] if you have any questions

## 2025-07-13 LAB — STAPHYLOCOCCUS SPEC CULT: ABNORMAL

## 2025-07-15 ENCOUNTER — HOSPITAL ENCOUNTER (OUTPATIENT)
Facility: HOSPITAL | Age: 70
Discharge: HOME | End: 2025-07-16
Attending: ORTHOPAEDIC SURGERY | Admitting: ORTHOPAEDIC SURGERY
Payer: MEDICARE

## 2025-07-15 ENCOUNTER — ANESTHESIA EVENT (OUTPATIENT)
Dept: OPERATING ROOM | Facility: HOSPITAL | Age: 70
End: 2025-07-15
Payer: MEDICARE

## 2025-07-15 ENCOUNTER — ANESTHESIA (OUTPATIENT)
Dept: OPERATING ROOM | Facility: HOSPITAL | Age: 70
End: 2025-07-15
Payer: MEDICARE

## 2025-07-15 ENCOUNTER — APPOINTMENT (OUTPATIENT)
Dept: RADIOLOGY | Facility: HOSPITAL | Age: 70
End: 2025-07-15
Payer: MEDICARE

## 2025-07-15 DIAGNOSIS — S82.851A TRIMALLEOLAR FRACTURE OF ANKLE, CLOSED, RIGHT, INITIAL ENCOUNTER: ICD-10-CM

## 2025-07-15 DIAGNOSIS — S82.841A ANKLE FRACTURE, BIMALLEOLAR, CLOSED, RIGHT, INITIAL ENCOUNTER: Primary | ICD-10-CM

## 2025-07-15 PROCEDURE — 2720000007 HC OR 272 NO HCPCS: Performed by: ORTHOPAEDIC SURGERY

## 2025-07-15 PROCEDURE — 3600000004 HC OR TIME - INITIAL BASE CHARGE - PROCEDURE LEVEL FOUR: Performed by: ORTHOPAEDIC SURGERY

## 2025-07-15 PROCEDURE — 2500000005 HC RX 250 GENERAL PHARMACY W/O HCPCS

## 2025-07-15 PROCEDURE — 2500000004 HC RX 250 GENERAL PHARMACY W/ HCPCS (ALT 636 FOR OP/ED): Performed by: ANESTHESIOLOGIST ASSISTANT

## 2025-07-15 PROCEDURE — 2500000004 HC RX 250 GENERAL PHARMACY W/ HCPCS (ALT 636 FOR OP/ED)

## 2025-07-15 PROCEDURE — 9420000001 HC RT PATIENT EDUCATION 5 MIN

## 2025-07-15 PROCEDURE — 64445 NJX AA&/STRD SCIATIC NRV IMG: CPT | Performed by: STUDENT IN AN ORGANIZED HEALTH CARE EDUCATION/TRAINING PROGRAM

## 2025-07-15 PROCEDURE — A27822 PR OPEN TX TRIMALLEOLAR ANKLE FX W/O FIX PST LIP: Performed by: STUDENT IN AN ORGANIZED HEALTH CARE EDUCATION/TRAINING PROGRAM

## 2025-07-15 PROCEDURE — C1713 ANCHOR/SCREW BN/BN,TIS/BN: HCPCS | Performed by: ORTHOPAEDIC SURGERY

## 2025-07-15 PROCEDURE — 2500000002 HC RX 250 W HCPCS SELF ADMINISTERED DRUGS (ALT 637 FOR MEDICARE OP, ALT 636 FOR OP/ED): Performed by: ORTHOPAEDIC SURGERY

## 2025-07-15 PROCEDURE — 2500000002 HC RX 250 W HCPCS SELF ADMINISTERED DRUGS (ALT 637 FOR MEDICARE OP, ALT 636 FOR OP/ED): Performed by: STUDENT IN AN ORGANIZED HEALTH CARE EDUCATION/TRAINING PROGRAM

## 2025-07-15 PROCEDURE — 7100000001 HC RECOVERY ROOM TIME - INITIAL BASE CHARGE: Performed by: ORTHOPAEDIC SURGERY

## 2025-07-15 PROCEDURE — 64447 NJX AA&/STRD FEMORAL NRV IMG: CPT | Performed by: STUDENT IN AN ORGANIZED HEALTH CARE EDUCATION/TRAINING PROGRAM

## 2025-07-15 PROCEDURE — 94640 AIRWAY INHALATION TREATMENT: CPT | Mod: 59,MUEWO

## 2025-07-15 PROCEDURE — A27822 PR OPEN TX TRIMALLEOLAR ANKLE FX W/O FIX PST LIP: Performed by: ANESTHESIOLOGIST ASSISTANT

## 2025-07-15 PROCEDURE — 3700000001 HC GENERAL ANESTHESIA TIME - INITIAL BASE CHARGE: Performed by: ORTHOPAEDIC SURGERY

## 2025-07-15 PROCEDURE — 7100000011 HC EXTENDED STAY RECOVERY HOURLY - NURSING UNIT

## 2025-07-15 PROCEDURE — 7100000002 HC RECOVERY ROOM TIME - EACH INCREMENTAL 1 MINUTE: Performed by: ORTHOPAEDIC SURGERY

## 2025-07-15 PROCEDURE — 76000 FLUOROSCOPY <1 HR PHYS/QHP: CPT | Mod: 59

## 2025-07-15 PROCEDURE — 2780000003 HC OR 278 NO HCPCS: Performed by: ORTHOPAEDIC SURGERY

## 2025-07-15 PROCEDURE — 2500000001 HC RX 250 WO HCPCS SELF ADMINISTERED DRUGS (ALT 637 FOR MEDICARE OP)

## 2025-07-15 PROCEDURE — 2500000005 HC RX 250 GENERAL PHARMACY W/O HCPCS: Performed by: STUDENT IN AN ORGANIZED HEALTH CARE EDUCATION/TRAINING PROGRAM

## 2025-07-15 PROCEDURE — 3600000009 HC OR TIME - EACH INCREMENTAL 1 MINUTE - PROCEDURE LEVEL FOUR: Performed by: ORTHOPAEDIC SURGERY

## 2025-07-15 PROCEDURE — C1769 GUIDE WIRE: HCPCS | Performed by: ORTHOPAEDIC SURGERY

## 2025-07-15 PROCEDURE — 2500000005 HC RX 250 GENERAL PHARMACY W/O HCPCS: Performed by: ORTHOPAEDIC SURGERY

## 2025-07-15 PROCEDURE — 2500000002 HC RX 250 W HCPCS SELF ADMINISTERED DRUGS (ALT 637 FOR MEDICARE OP, ALT 636 FOR OP/ED)

## 2025-07-15 PROCEDURE — 2500000005 HC RX 250 GENERAL PHARMACY W/O HCPCS: Performed by: ANESTHESIOLOGIST ASSISTANT

## 2025-07-15 PROCEDURE — 3700000002 HC GENERAL ANESTHESIA TIME - EACH INCREMENTAL 1 MINUTE: Performed by: ORTHOPAEDIC SURGERY

## 2025-07-15 PROCEDURE — 27822 TREATMENT OF ANKLE FRACTURE: CPT | Performed by: ORTHOPAEDIC SURGERY

## 2025-07-15 PROCEDURE — RXMED WILLOW AMBULATORY MEDICATION CHARGE

## 2025-07-15 DEVICE — SCREW, LOCKING, 2.7MM, T8, 18MM: Type: IMPLANTABLE DEVICE | Site: ANKLE | Status: FUNCTIONAL

## 2025-07-15 DEVICE — IMPLANTABLE DEVICE: Type: IMPLANTABLE DEVICE | Site: ANKLE | Status: FUNCTIONAL

## 2025-07-15 DEVICE — SCREW, CORT 3.5 X 12 TI: Type: IMPLANTABLE DEVICE | Site: ANKLE | Status: FUNCTIONAL

## 2025-07-15 DEVICE — SCREW, LOCKING, 2.7MM, T8, 16MM: Type: IMPLANTABLE DEVICE | Site: ANKLE | Status: FUNCTIONAL

## 2025-07-15 DEVICE — SCREW, CORT 3.5 X 16 TI: Type: IMPLANTABLE DEVICE | Site: ANKLE | Status: FUNCTIONAL

## 2025-07-15 DEVICE — SCREW, CORT 3.5 X 14 TI: Type: IMPLANTABLE DEVICE | Site: ANKLE | Status: FUNCTIONAL

## 2025-07-15 RX ORDER — ALBUTEROL SULFATE 0.83 MG/ML
3 SOLUTION RESPIRATORY (INHALATION) EVERY 6 HOURS PRN
Status: DISCONTINUED | OUTPATIENT
Start: 2025-07-15 | End: 2025-07-15

## 2025-07-15 RX ORDER — ONDANSETRON HYDROCHLORIDE 2 MG/ML
INJECTION, SOLUTION INTRAVENOUS AS NEEDED
Status: DISCONTINUED | OUTPATIENT
Start: 2025-07-15 | End: 2025-07-15

## 2025-07-15 RX ORDER — OXYCODONE HYDROCHLORIDE 5 MG/1
5 TABLET ORAL EVERY 4 HOURS PRN
Status: DISCONTINUED | OUTPATIENT
Start: 2025-07-15 | End: 2025-07-15 | Stop reason: HOSPADM

## 2025-07-15 RX ORDER — ATORVASTATIN CALCIUM 20 MG/1
20 TABLET, FILM COATED ORAL DAILY
Status: DISCONTINUED | OUTPATIENT
Start: 2025-07-15 | End: 2025-07-16 | Stop reason: HOSPADM

## 2025-07-15 RX ORDER — NALOXONE HYDROCHLORIDE 0.4 MG/ML
0.2 INJECTION, SOLUTION INTRAMUSCULAR; INTRAVENOUS; SUBCUTANEOUS EVERY 5 MIN PRN
Status: DISCONTINUED | OUTPATIENT
Start: 2025-07-15 | End: 2025-07-16 | Stop reason: HOSPADM

## 2025-07-15 RX ORDER — BUDESONIDE 0.5 MG/2ML
0.5 INHALANT ORAL
Status: DISCONTINUED | OUTPATIENT
Start: 2025-07-15 | End: 2025-07-15

## 2025-07-15 RX ORDER — BUDESONIDE 0.5 MG/2ML
0.5 INHALANT ORAL
Status: DISCONTINUED | OUTPATIENT
Start: 2025-07-15 | End: 2025-07-16 | Stop reason: HOSPADM

## 2025-07-15 RX ORDER — BISACODYL 5 MG
10 TABLET, DELAYED RELEASE (ENTERIC COATED) ORAL DAILY PRN
Status: DISCONTINUED | OUTPATIENT
Start: 2025-07-15 | End: 2025-07-16 | Stop reason: HOSPADM

## 2025-07-15 RX ORDER — LEVOTHYROXINE SODIUM 25 UG/1
25 TABLET ORAL
Status: DISCONTINUED | OUTPATIENT
Start: 2025-07-16 | End: 2025-07-16 | Stop reason: HOSPADM

## 2025-07-15 RX ORDER — ASPIRIN 325 MG
325 TABLET ORAL 2 TIMES DAILY
Qty: 84 TABLET | Refills: 0 | Status: SHIPPED | OUTPATIENT
Start: 2025-07-15 | End: 2025-07-18 | Stop reason: SINTOL

## 2025-07-15 RX ORDER — BISACODYL 10 MG/1
10 SUPPOSITORY RECTAL DAILY PRN
Status: DISCONTINUED | OUTPATIENT
Start: 2025-07-15 | End: 2025-07-16 | Stop reason: HOSPADM

## 2025-07-15 RX ORDER — IPRATROPIUM BROMIDE AND ALBUTEROL SULFATE 2.5; .5 MG/3ML; MG/3ML
3 SOLUTION RESPIRATORY (INHALATION)
Status: DISCONTINUED | OUTPATIENT
Start: 2025-07-15 | End: 2025-07-16 | Stop reason: HOSPADM

## 2025-07-15 RX ORDER — CEFAZOLIN SODIUM 2 G/50ML
2 SOLUTION INTRAVENOUS ONCE
Status: DISCONTINUED | OUTPATIENT
Start: 2025-07-15 | End: 2025-07-15 | Stop reason: HOSPADM

## 2025-07-15 RX ORDER — IPRATROPIUM BROMIDE AND ALBUTEROL SULFATE 2.5; .5 MG/3ML; MG/3ML
3 SOLUTION RESPIRATORY (INHALATION) ONCE
Status: COMPLETED | OUTPATIENT
Start: 2025-07-15 | End: 2025-07-15

## 2025-07-15 RX ORDER — ACETAMINOPHEN 325 MG/1
650 TABLET ORAL EVERY 4 HOURS PRN
Status: DISCONTINUED | OUTPATIENT
Start: 2025-07-15 | End: 2025-07-15 | Stop reason: HOSPADM

## 2025-07-15 RX ORDER — DOCUSATE SODIUM 100 MG/1
100 CAPSULE, LIQUID FILLED ORAL 2 TIMES DAILY
Qty: 28 CAPSULE | Refills: 2 | Status: SHIPPED | OUTPATIENT
Start: 2025-07-15 | End: 2025-08-26

## 2025-07-15 RX ORDER — ONDANSETRON 4 MG/1
4 TABLET, FILM COATED ORAL EVERY 8 HOURS PRN
Qty: 20 TABLET | Refills: 0 | Status: SHIPPED | OUTPATIENT
Start: 2025-07-15

## 2025-07-15 RX ORDER — LIDOCAINE HYDROCHLORIDE 10 MG/ML
0.1 INJECTION, SOLUTION EPIDURAL; INFILTRATION; INTRACAUDAL; PERINEURAL ONCE
Status: DISCONTINUED | OUTPATIENT
Start: 2025-07-15 | End: 2025-07-15 | Stop reason: HOSPADM

## 2025-07-15 RX ORDER — ONDANSETRON 4 MG/1
4 TABLET, ORALLY DISINTEGRATING ORAL EVERY 8 HOURS PRN
Status: DISCONTINUED | OUTPATIENT
Start: 2025-07-15 | End: 2025-07-16 | Stop reason: HOSPADM

## 2025-07-15 RX ORDER — LIDOCAINE HYDROCHLORIDE 20 MG/ML
INJECTION, SOLUTION EPIDURAL; INFILTRATION; INTRACAUDAL; PERINEURAL AS NEEDED
Status: DISCONTINUED | OUTPATIENT
Start: 2025-07-15 | End: 2025-07-15

## 2025-07-15 RX ORDER — DIPHENHYDRAMINE HCL 25 MG
25 CAPSULE ORAL EVERY 6 HOURS PRN
Status: DISCONTINUED | OUTPATIENT
Start: 2025-07-15 | End: 2025-07-16 | Stop reason: HOSPADM

## 2025-07-15 RX ORDER — ROCURONIUM BROMIDE 10 MG/ML
INJECTION, SOLUTION INTRAVENOUS AS NEEDED
Status: DISCONTINUED | OUTPATIENT
Start: 2025-07-15 | End: 2025-07-15

## 2025-07-15 RX ORDER — DIPHENHYDRAMINE HYDROCHLORIDE 50 MG/ML
25 INJECTION, SOLUTION INTRAMUSCULAR; INTRAVENOUS EVERY 6 HOURS PRN
Status: DISCONTINUED | OUTPATIENT
Start: 2025-07-15 | End: 2025-07-16 | Stop reason: HOSPADM

## 2025-07-15 RX ORDER — LIDOCAINE HYDROCHLORIDE 40 MG/ML
SOLUTION TOPICAL AS NEEDED
Status: DISCONTINUED | OUTPATIENT
Start: 2025-07-15 | End: 2025-07-15

## 2025-07-15 RX ORDER — HYDROCODONE BITARTRATE AND ACETAMINOPHEN 5; 325 MG/1; MG/1
1 TABLET ORAL EVERY 4 HOURS PRN
Qty: 42 TABLET | Refills: 0 | Status: SHIPPED | OUTPATIENT
Start: 2025-07-15 | End: 2025-07-23

## 2025-07-15 RX ORDER — IPRATROPIUM BROMIDE AND ALBUTEROL SULFATE 2.5; .5 MG/3ML; MG/3ML
3 SOLUTION RESPIRATORY (INHALATION)
Status: DISCONTINUED | OUTPATIENT
Start: 2025-07-15 | End: 2025-07-15

## 2025-07-15 RX ORDER — HYDROCODONE BITARTRATE AND ACETAMINOPHEN 5; 325 MG/1; MG/1
1 TABLET ORAL EVERY 4 HOURS PRN
Status: DISCONTINUED | OUTPATIENT
Start: 2025-07-15 | End: 2025-07-16 | Stop reason: HOSPADM

## 2025-07-15 RX ORDER — PANTOPRAZOLE SODIUM 40 MG/1
40 TABLET, DELAYED RELEASE ORAL
Status: DISCONTINUED | OUTPATIENT
Start: 2025-07-16 | End: 2025-07-16 | Stop reason: HOSPADM

## 2025-07-15 RX ORDER — PROMETHAZINE HYDROCHLORIDE 25 MG/1
25 SUPPOSITORY RECTAL EVERY 12 HOURS PRN
Status: DISCONTINUED | OUTPATIENT
Start: 2025-07-15 | End: 2025-07-16 | Stop reason: HOSPADM

## 2025-07-15 RX ORDER — DULOXETIN HYDROCHLORIDE 30 MG/1
60 CAPSULE, DELAYED RELEASE ORAL DAILY
Status: DISCONTINUED | OUTPATIENT
Start: 2025-07-16 | End: 2025-07-16 | Stop reason: HOSPADM

## 2025-07-15 RX ORDER — ONDANSETRON HYDROCHLORIDE 2 MG/ML
4 INJECTION, SOLUTION INTRAVENOUS ONCE AS NEEDED
Status: DISCONTINUED | OUTPATIENT
Start: 2025-07-15 | End: 2025-07-15 | Stop reason: HOSPADM

## 2025-07-15 RX ORDER — TRAMADOL HYDROCHLORIDE 50 MG/1
50 TABLET, FILM COATED ORAL EVERY 6 HOURS PRN
Status: DISCONTINUED | OUTPATIENT
Start: 2025-07-15 | End: 2025-07-16 | Stop reason: HOSPADM

## 2025-07-15 RX ORDER — ASPIRIN 325 MG
325 TABLET ORAL 2 TIMES DAILY
Status: DISCONTINUED | OUTPATIENT
Start: 2025-07-15 | End: 2025-07-16 | Stop reason: HOSPADM

## 2025-07-15 RX ORDER — PROMETHAZINE HYDROCHLORIDE 25 MG/1
25 TABLET ORAL EVERY 8 HOURS PRN
Status: DISCONTINUED | OUTPATIENT
Start: 2025-07-15 | End: 2025-07-16 | Stop reason: HOSPADM

## 2025-07-15 RX ORDER — PROMETHAZINE HYDROCHLORIDE 25 MG/1
25 TABLET ORAL 2 TIMES DAILY
Status: DISCONTINUED | OUTPATIENT
Start: 2025-07-15 | End: 2025-07-15

## 2025-07-15 RX ORDER — SODIUM CHLORIDE, SODIUM LACTATE, POTASSIUM CHLORIDE, CALCIUM CHLORIDE 600; 310; 30; 20 MG/100ML; MG/100ML; MG/100ML; MG/100ML
100 INJECTION, SOLUTION INTRAVENOUS CONTINUOUS
Status: DISCONTINUED | OUTPATIENT
Start: 2025-07-15 | End: 2025-07-16 | Stop reason: HOSPADM

## 2025-07-15 RX ORDER — DIPHENHYDRAMINE HCL 12.5MG/5ML
25 LIQUID (ML) ORAL EVERY 6 HOURS PRN
Status: DISCONTINUED | OUTPATIENT
Start: 2025-07-15 | End: 2025-07-16 | Stop reason: HOSPADM

## 2025-07-15 RX ORDER — PROMETHAZINE HYDROCHLORIDE 25 MG/1
25 TABLET ORAL EVERY 6 HOURS PRN
Status: DISCONTINUED | OUTPATIENT
Start: 2025-07-15 | End: 2025-07-16 | Stop reason: HOSPADM

## 2025-07-15 RX ORDER — CEFAZOLIN SODIUM 2 G/50ML
2 SOLUTION INTRAVENOUS EVERY 8 HOURS
Status: COMPLETED | OUTPATIENT
Start: 2025-07-15 | End: 2025-07-16

## 2025-07-15 RX ORDER — FENTANYL CITRATE 50 UG/ML
INJECTION, SOLUTION INTRAMUSCULAR; INTRAVENOUS AS NEEDED
Status: DISCONTINUED | OUTPATIENT
Start: 2025-07-15 | End: 2025-07-15

## 2025-07-15 RX ORDER — MIDAZOLAM HYDROCHLORIDE 2 MG/2ML
INJECTION, SOLUTION INTRAMUSCULAR; INTRAVENOUS AS NEEDED
Status: DISCONTINUED | OUTPATIENT
Start: 2025-07-15 | End: 2025-07-15

## 2025-07-15 RX ORDER — SENNOSIDES 8.6 MG/1
2 TABLET ORAL 2 TIMES DAILY
Status: DISCONTINUED | OUTPATIENT
Start: 2025-07-15 | End: 2025-07-16 | Stop reason: HOSPADM

## 2025-07-15 RX ORDER — TIZANIDINE 4 MG/1
8 TABLET ORAL NIGHTLY
Status: DISCONTINUED | OUTPATIENT
Start: 2025-07-15 | End: 2025-07-16 | Stop reason: HOSPADM

## 2025-07-15 RX ORDER — ONDANSETRON HYDROCHLORIDE 2 MG/ML
4 INJECTION, SOLUTION INTRAVENOUS EVERY 8 HOURS PRN
Status: DISCONTINUED | OUTPATIENT
Start: 2025-07-15 | End: 2025-07-16 | Stop reason: HOSPADM

## 2025-07-15 RX ORDER — ROPINIROLE 1 MG/1
1 TABLET, FILM COATED ORAL NIGHTLY
Status: DISCONTINUED | OUTPATIENT
Start: 2025-07-15 | End: 2025-07-16 | Stop reason: HOSPADM

## 2025-07-15 RX ORDER — CEFAZOLIN 1 G/1
INJECTION, POWDER, FOR SOLUTION INTRAVENOUS AS NEEDED
Status: DISCONTINUED | OUTPATIENT
Start: 2025-07-15 | End: 2025-07-15

## 2025-07-15 RX ORDER — HYDROCODONE BITARTRATE AND ACETAMINOPHEN 5; 325 MG/1; MG/1
2 TABLET ORAL EVERY 4 HOURS PRN
Status: DISCONTINUED | OUTPATIENT
Start: 2025-07-15 | End: 2025-07-16 | Stop reason: HOSPADM

## 2025-07-15 RX ORDER — PROPOFOL 10 MG/ML
INJECTION, EMULSION INTRAVENOUS AS NEEDED
Status: DISCONTINUED | OUTPATIENT
Start: 2025-07-15 | End: 2025-07-15

## 2025-07-15 RX ORDER — POLYETHYLENE GLYCOL 3350 17 G/17G
17 POWDER, FOR SOLUTION ORAL DAILY
Status: DISCONTINUED | OUTPATIENT
Start: 2025-07-15 | End: 2025-07-16 | Stop reason: HOSPADM

## 2025-07-15 RX ORDER — PROCHLORPERAZINE EDISYLATE 5 MG/ML
5 INJECTION INTRAMUSCULAR; INTRAVENOUS ONCE AS NEEDED
Status: DISCONTINUED | OUTPATIENT
Start: 2025-07-15 | End: 2025-07-15 | Stop reason: HOSPADM

## 2025-07-15 RX ORDER — ALBUTEROL SULFATE 0.83 MG/ML
3 SOLUTION RESPIRATORY (INHALATION) EVERY 2 HOUR PRN
Status: DISCONTINUED | OUTPATIENT
Start: 2025-07-15 | End: 2025-07-16 | Stop reason: HOSPADM

## 2025-07-15 RX ORDER — OXYCODONE HYDROCHLORIDE 5 MG/1
10 TABLET ORAL EVERY 4 HOURS PRN
Status: DISCONTINUED | OUTPATIENT
Start: 2025-07-15 | End: 2025-07-15 | Stop reason: HOSPADM

## 2025-07-15 RX ORDER — GABAPENTIN 300 MG/1
900 CAPSULE ORAL 2 TIMES DAILY
Status: DISCONTINUED | OUTPATIENT
Start: 2025-07-15 | End: 2025-07-16 | Stop reason: HOSPADM

## 2025-07-15 RX ADMIN — LIDOCAINE HYDROCHLORIDE 60 MG: 20 INJECTION, SOLUTION EPIDURAL; INFILTRATION; INTRACAUDAL; PERINEURAL at 10:51

## 2025-07-15 RX ADMIN — SENNOSIDES 17.2 MG: 8.6 TABLET, FILM COATED ORAL at 20:11

## 2025-07-15 RX ADMIN — ASPIRIN 325 MG: 325 TABLET ORAL at 20:11

## 2025-07-15 RX ADMIN — ONDANSETRON 4 MG: 2 INJECTION, SOLUTION INTRAMUSCULAR; INTRAVENOUS at 11:58

## 2025-07-15 RX ADMIN — CEFAZOLIN SODIUM 2 G: 2 SOLUTION INTRAVENOUS at 19:18

## 2025-07-15 RX ADMIN — Medication 4 L/MIN: at 08:38

## 2025-07-15 RX ADMIN — MIDAZOLAM HYDROCHLORIDE 0.5 MG: 1 INJECTION, SOLUTION INTRAMUSCULAR; INTRAVENOUS at 10:43

## 2025-07-15 RX ADMIN — POVIDONE-IODINE 1 APPLICATION: 5 SOLUTION TOPICAL at 08:51

## 2025-07-15 RX ADMIN — ATORVASTATIN CALCIUM 20 MG: 20 TABLET, FILM COATED ORAL at 16:18

## 2025-07-15 RX ADMIN — FENTANYL CITRATE 50 MCG: 50 INJECTION, SOLUTION INTRAMUSCULAR; INTRAVENOUS at 11:19

## 2025-07-15 RX ADMIN — SODIUM CHLORIDE, SODIUM LACTATE, POTASSIUM CHLORIDE, AND CALCIUM CHLORIDE: .6; .31; .03; .02 INJECTION, SOLUTION INTRAVENOUS at 10:43

## 2025-07-15 RX ADMIN — DEXAMETHASONE SODIUM PHOSPHATE 6 MG: 4 INJECTION, SOLUTION INTRAMUSCULAR; INTRAVENOUS at 10:51

## 2025-07-15 RX ADMIN — Medication 4 L/MIN: at 19:05

## 2025-07-15 RX ADMIN — Medication 40 MG: at 10:52

## 2025-07-15 RX ADMIN — IPRATROPIUM BROMIDE AND ALBUTEROL SULFATE 3 ML: 2.5; .5 SOLUTION RESPIRATORY (INHALATION) at 15:27

## 2025-07-15 RX ADMIN — CARBOXYMETHYLCELLULOSE SODIUM 2 DROP: 5 SOLUTION/ DROPS OPHTHALMIC at 10:51

## 2025-07-15 RX ADMIN — HYDROCODONE BITARTRATE AND ACETAMINOPHEN 2 TABLET: 5; 325 TABLET ORAL at 18:38

## 2025-07-15 RX ADMIN — SUGAMMADEX 150 MG: 100 INJECTION, SOLUTION INTRAVENOUS at 12:11

## 2025-07-15 RX ADMIN — Medication 20 MG: at 10:51

## 2025-07-15 RX ADMIN — IPRATROPIUM BROMIDE AND ALBUTEROL SULFATE 3 ML: 2.5; .5 SOLUTION RESPIRATORY (INHALATION) at 10:07

## 2025-07-15 RX ADMIN — ROCURONIUM BROMIDE 50 MG: 10 INJECTION, SOLUTION INTRAVENOUS at 10:52

## 2025-07-15 RX ADMIN — LIDOCAINE HYDROCHLORIDE 4 ML: 40 SOLUTION TOPICAL at 10:55

## 2025-07-15 RX ADMIN — CEFAZOLIN 2 G: 1 INJECTION, POWDER, FOR SOLUTION INTRAMUSCULAR; INTRAVENOUS at 10:58

## 2025-07-15 RX ADMIN — TIZANIDINE 8 MG: 4 TABLET ORAL at 20:11

## 2025-07-15 RX ADMIN — FENTANYL CITRATE 50 MCG: 50 INJECTION, SOLUTION INTRAMUSCULAR; INTRAVENOUS at 10:50

## 2025-07-15 RX ADMIN — IPRATROPIUM BROMIDE AND ALBUTEROL SULFATE 3 ML: 2.5; .5 SOLUTION RESPIRATORY (INHALATION) at 19:05

## 2025-07-15 RX ADMIN — ROPINIROLE HYDROCHLORIDE 1 MG: 1 TABLET, FILM COATED ORAL at 20:11

## 2025-07-15 RX ADMIN — Medication 6 L/MIN: at 12:26

## 2025-07-15 RX ADMIN — PROPOFOL 100 MG: 10 INJECTION, EMULSION INTRAVENOUS at 10:51

## 2025-07-15 RX ADMIN — GABAPENTIN 900 MG: 300 CAPSULE ORAL at 20:11

## 2025-07-15 RX ADMIN — BUDESONIDE 0.5 MG: 0.5 INHALANT RESPIRATORY (INHALATION) at 19:05

## 2025-07-15 RX ADMIN — PROPOFOL 40 MG: 10 INJECTION, EMULSION INTRAVENOUS at 11:38

## 2025-07-15 RX ADMIN — SODIUM CHLORIDE, SODIUM LACTATE, POTASSIUM CHLORIDE, AND CALCIUM CHLORIDE 100 ML/HR: .6; .31; .03; .02 INJECTION, SOLUTION INTRAVENOUS at 15:20

## 2025-07-15 RX ADMIN — Medication 2 L/MIN: at 15:21

## 2025-07-15 SDOH — SOCIAL STABILITY: SOCIAL INSECURITY
WITHIN THE LAST YEAR, HAVE YOU BEEN RAPED OR FORCED TO HAVE ANY KIND OF SEXUAL ACTIVITY BY YOUR PARTNER OR EX-PARTNER?: NO

## 2025-07-15 SDOH — ECONOMIC STABILITY: FOOD INSECURITY: WITHIN THE PAST 12 MONTHS, YOU WORRIED THAT YOUR FOOD WOULD RUN OUT BEFORE YOU GOT THE MONEY TO BUY MORE.: NEVER TRUE

## 2025-07-15 SDOH — ECONOMIC STABILITY: HOUSING INSECURITY: IN THE LAST 12 MONTHS, WAS THERE A TIME WHEN YOU WERE NOT ABLE TO PAY THE MORTGAGE OR RENT ON TIME?: NO

## 2025-07-15 SDOH — SOCIAL STABILITY: SOCIAL INSECURITY: WITHIN THE LAST YEAR, HAVE YOU BEEN HUMILIATED OR EMOTIONALLY ABUSED IN OTHER WAYS BY YOUR PARTNER OR EX-PARTNER?: NO

## 2025-07-15 SDOH — SOCIAL STABILITY: SOCIAL INSECURITY: HAVE YOU HAD ANY THOUGHTS OF HARMING ANYONE ELSE?: NO

## 2025-07-15 SDOH — SOCIAL STABILITY: SOCIAL INSECURITY: WITHIN THE LAST YEAR, HAVE YOU BEEN AFRAID OF YOUR PARTNER OR EX-PARTNER?: NO

## 2025-07-15 SDOH — ECONOMIC STABILITY: HOUSING INSECURITY: IN THE PAST 12 MONTHS, HOW MANY TIMES HAVE YOU MOVED WHERE YOU WERE LIVING?: 0

## 2025-07-15 SDOH — SOCIAL STABILITY: SOCIAL INSECURITY: ARE YOU OR HAVE YOU BEEN THREATENED OR ABUSED PHYSICALLY, EMOTIONALLY, OR SEXUALLY BY ANYONE?: NO

## 2025-07-15 SDOH — SOCIAL STABILITY: SOCIAL INSECURITY
WITHIN THE LAST YEAR, HAVE YOU BEEN KICKED, HIT, SLAPPED, OR OTHERWISE PHYSICALLY HURT BY YOUR PARTNER OR EX-PARTNER?: NO

## 2025-07-15 SDOH — SOCIAL STABILITY: SOCIAL INSECURITY: ABUSE: ADULT

## 2025-07-15 SDOH — ECONOMIC STABILITY: INCOME INSECURITY: IN THE PAST 12 MONTHS HAS THE ELECTRIC, GAS, OIL, OR WATER COMPANY THREATENED TO SHUT OFF SERVICES IN YOUR HOME?: NO

## 2025-07-15 SDOH — SOCIAL STABILITY: SOCIAL INSECURITY: DO YOU FEEL ANYONE HAS EXPLOITED OR TAKEN ADVANTAGE OF YOU FINANCIALLY OR OF YOUR PERSONAL PROPERTY?: NO

## 2025-07-15 SDOH — SOCIAL STABILITY: SOCIAL INSECURITY: DOES ANYONE TRY TO KEEP YOU FROM HAVING/CONTACTING OTHER FRIENDS OR DOING THINGS OUTSIDE YOUR HOME?: NO

## 2025-07-15 SDOH — ECONOMIC STABILITY: HOUSING INSECURITY: AT ANY TIME IN THE PAST 12 MONTHS, WERE YOU HOMELESS OR LIVING IN A SHELTER (INCLUDING NOW)?: NO

## 2025-07-15 SDOH — ECONOMIC STABILITY: FOOD INSECURITY: WITHIN THE PAST 12 MONTHS, THE FOOD YOU BOUGHT JUST DIDN'T LAST AND YOU DIDN'T HAVE MONEY TO GET MORE.: NEVER TRUE

## 2025-07-15 SDOH — SOCIAL STABILITY: SOCIAL INSECURITY: DO YOU FEEL UNSAFE GOING BACK TO THE PLACE WHERE YOU ARE LIVING?: NO

## 2025-07-15 SDOH — SOCIAL STABILITY: SOCIAL INSECURITY: HAS ANYONE EVER THREATENED TO HURT YOUR FAMILY OR YOUR PETS?: NO

## 2025-07-15 SDOH — SOCIAL STABILITY: SOCIAL INSECURITY: WERE YOU ABLE TO COMPLETE ALL THE BEHAVIORAL HEALTH SCREENINGS?: YES

## 2025-07-15 SDOH — ECONOMIC STABILITY: FOOD INSECURITY: HOW HARD IS IT FOR YOU TO PAY FOR THE VERY BASICS LIKE FOOD, HOUSING, MEDICAL CARE, AND HEATING?: NOT HARD AT ALL

## 2025-07-15 SDOH — ECONOMIC STABILITY: TRANSPORTATION INSECURITY: IN THE PAST 12 MONTHS, HAS LACK OF TRANSPORTATION KEPT YOU FROM MEDICAL APPOINTMENTS OR FROM GETTING MEDICATIONS?: NO

## 2025-07-15 SDOH — SOCIAL STABILITY: SOCIAL INSECURITY: ARE THERE ANY APPARENT SIGNS OF INJURIES/BEHAVIORS THAT COULD BE RELATED TO ABUSE/NEGLECT?: NO

## 2025-07-15 SDOH — SOCIAL STABILITY: SOCIAL INSECURITY: HAVE YOU HAD THOUGHTS OF HARMING ANYONE ELSE?: NO

## 2025-07-15 ASSESSMENT — ACTIVITIES OF DAILY LIVING (ADL)
ADEQUATE_TO_COMPLETE_ADL: YES
HEARING - RIGHT EAR: FUNCTIONAL
BATHING: INDEPENDENT
DRESSING YOURSELF: INDEPENDENT
ASSISTIVE_DEVICE: OXYGEN;EYEGLASSES
JUDGMENT_ADEQUATE_SAFELY_COMPLETE_DAILY_ACTIVITIES: YES
WALKS IN HOME: INDEPENDENT
LACK_OF_TRANSPORTATION: NO
GROOMING: INDEPENDENT
TOILETING: INDEPENDENT
PATIENT'S MEMORY ADEQUATE TO SAFELY COMPLETE DAILY ACTIVITIES?: YES
FEEDING YOURSELF: INDEPENDENT
HEARING - LEFT EAR: FUNCTIONAL

## 2025-07-15 ASSESSMENT — PAIN - FUNCTIONAL ASSESSMENT
PAIN_FUNCTIONAL_ASSESSMENT: 0-10
PAIN_FUNCTIONAL_ASSESSMENT: UNABLE TO SELF-REPORT
PAIN_FUNCTIONAL_ASSESSMENT: 0-10
PAIN_FUNCTIONAL_ASSESSMENT: UNABLE TO SELF-REPORT
PAIN_FUNCTIONAL_ASSESSMENT: 0-10
PAIN_FUNCTIONAL_ASSESSMENT: 0-10

## 2025-07-15 ASSESSMENT — PAIN SCALES - GENERAL
PAINLEVEL_OUTOF10: 5 - MODERATE PAIN
PAINLEVEL_OUTOF10: 0 - NO PAIN
PAINLEVEL_OUTOF10: 2
PAINLEVEL_OUTOF10: 0 - NO PAIN
PAINLEVEL_OUTOF10: 5 - MODERATE PAIN
PAINLEVEL_OUTOF10: 5 - MODERATE PAIN
PAINLEVEL_OUTOF10: 0 - NO PAIN
PAINLEVEL_OUTOF10: 8
PAINLEVEL_OUTOF10: 7
PAINLEVEL_OUTOF10: 8
PAINLEVEL_OUTOF10: 5 - MODERATE PAIN

## 2025-07-15 ASSESSMENT — COGNITIVE AND FUNCTIONAL STATUS - GENERAL
WALKING IN HOSPITAL ROOM: A LITTLE
DAILY ACTIVITIY SCORE: 19
MOBILITY SCORE: 18
DRESSING REGULAR UPPER BODY CLOTHING: A LITTLE
HELP NEEDED FOR BATHING: A LITTLE
PATIENT BASELINE BEDBOUND: NO
STANDING UP FROM CHAIR USING ARMS: A LITTLE
DRESSING REGULAR LOWER BODY CLOTHING: A LITTLE
DAILY ACTIVITIY SCORE: 19
STANDING UP FROM CHAIR USING ARMS: A LITTLE
WALKING IN HOSPITAL ROOM: A LITTLE
TOILETING: A LITTLE
TOILETING: A LITTLE
DRESSING REGULAR UPPER BODY CLOTHING: A LITTLE
TURNING FROM BACK TO SIDE WHILE IN FLAT BAD: A LITTLE
DRESSING REGULAR LOWER BODY CLOTHING: A LITTLE
HELP NEEDED FOR BATHING: A LITTLE
TURNING FROM BACK TO SIDE WHILE IN FLAT BAD: A LITTLE
CLIMB 3 TO 5 STEPS WITH RAILING: A LITTLE
MOVING TO AND FROM BED TO CHAIR: A LITTLE
PERSONAL GROOMING: A LITTLE
MOVING FROM LYING ON BACK TO SITTING ON SIDE OF FLAT BED WITH BEDRAILS: A LITTLE
CLIMB 3 TO 5 STEPS WITH RAILING: A LITTLE
MOBILITY SCORE: 18
MOVING TO AND FROM BED TO CHAIR: A LITTLE
PERSONAL GROOMING: A LITTLE
MOVING FROM LYING ON BACK TO SITTING ON SIDE OF FLAT BED WITH BEDRAILS: A LITTLE

## 2025-07-15 ASSESSMENT — PATIENT HEALTH QUESTIONNAIRE - PHQ9
1. LITTLE INTEREST OR PLEASURE IN DOING THINGS: NOT AT ALL
SUM OF ALL RESPONSES TO PHQ9 QUESTIONS 1 & 2: 0
2. FEELING DOWN, DEPRESSED OR HOPELESS: NOT AT ALL

## 2025-07-15 ASSESSMENT — LIFESTYLE VARIABLES
HOW OFTEN DO YOU HAVE A DRINK CONTAINING ALCOHOL: NEVER
SKIP TO QUESTIONS 9-10: 1
AUDIT-C TOTAL SCORE: 0
HOW OFTEN DO YOU HAVE 6 OR MORE DRINKS ON ONE OCCASION: NEVER
AUDIT-C TOTAL SCORE: 0
HOW MANY STANDARD DRINKS CONTAINING ALCOHOL DO YOU HAVE ON A TYPICAL DAY: PATIENT DOES NOT DRINK

## 2025-07-15 ASSESSMENT — PAIN DESCRIPTION - ORIENTATION: ORIENTATION: RIGHT

## 2025-07-15 ASSESSMENT — PAIN DESCRIPTION - DESCRIPTORS: DESCRIPTORS: ACHING

## 2025-07-15 ASSESSMENT — PAIN DESCRIPTION - LOCATION: LOCATION: FOOT

## 2025-07-15 NOTE — ANESTHESIA PROCEDURE NOTES
Airway  Date/Time: 7/15/2025 10:55 AM  Reason: elective    Airway not difficult    Staffing  Performed: JONATHON   Authorized by: Brian Cook DO    Performed by: JONATHON Gaitan  Patient location during procedure: OR    Patient Condition  Indications for airway management: anesthesia and airway protection  Patient position: sniffing  MILS maintained throughout  Planned trial extubation  Sedation level: deep     Final Airway Details   Preoxygenated: yes  Final airway type: endotracheal airway  Successful airway: ETT  Cuffed: yes   Successful intubation technique: video laryngoscopy (Brown 3)  Adjuncts used in placement: intubating stylet  Endotracheal tube insertion site: oral  Blade: Colton  Blade size: #3  ETT size (mm): 7.5  Cormack-Lehane Classification: grade I - full view of glottis  Placement verified by: chest auscultation and capnometry   Measured from: lips  ETT to lips (cm): 22  Number of attempts at approach: 1  Number of other approaches attempted: 0    Additional Comments  Easy grade I view. Teeth and lips in pre-op condition.

## 2025-07-15 NOTE — CARE PLAN
The patient's goals for the shift include      The clinical goals for the shift include Safety    Over the shift, the patient is making progress toward the following goals. Barriers to progression include     Problem: Pain - Adult  Goal: Verbalizes/displays adequate comfort level or baseline comfort level  Outcome: Progressing     Problem: Safety - Adult  Goal: Free from fall injury  Outcome: Progressing     Problem: Discharge Planning  Goal: Discharge to home or other facility with appropriate resources  Outcome: Progressing     Problem: Chronic Conditions and Co-morbidities  Goal: Patient's chronic conditions and co-morbidity symptoms are monitored and maintained or improved  Outcome: Progressing     Problem: Nutrition  Goal: Nutrient intake appropriate for maintaining nutritional needs  Outcome: Progressing

## 2025-07-15 NOTE — ANESTHESIA PREPROCEDURE EVALUATION
Patient: Fatmata Plummer    Procedure Information       Date/Time: 07/15/25 0955    Procedure: Right Ankle Fracture Open Reduction Internal Fixation (Right: Ankle)    Location: [facility] OR 11 / [facility]    Surgeons: Jaky Gibson MD            Relevant Problems   Cardiac   (+) Hyperlipidemia   (+) Iliac artery occlusion, left (Multi)   (+) PAD (peripheral artery disease)   (+) Subclavian artery stenosis, left      Pulmonary   (+) Adenocarcinoma of lung (Multi)   (+) COPD (chronic obstructive pulmonary disease) (Multi)   (+) COPD exacerbation (Multi)      Neuro   (+) Anxiety and depression   (+) Bilateral carotid artery stenosis   (+) Trigeminal neuralgia      Endocrine   (+) Hypothyroidism       Clinical information reviewed:   Tobacco  Allergies  Meds   Med Hx  Surg Hx   Fam Hx  Soc Hx        NPO Detail:  NPO/Void Status  Carbohydrate Drink Given Prior to Surgery? : N  Date of Last Liquid: 07/15/25  Time of Last Liquid: 0600  Date of Last Solid: 25  Time of Last Solid: 1900  Last Intake Type: Clear fluids (water)  Time of Last Void: 0800         Physical Exam    Airway  Mallampati: III  TM distance: >3 FB  Neck ROM: full  Mouth openin finger widths     Cardiovascular - normal exam   Dental    Pulmonary - normal exam   Abdominal          Anesthesia Plan    History of general anesthesia?: yes  History of complications of general anesthesia?: no    ASA 3     general     intravenous induction   Anesthetic plan and risks discussed with patient.  Use of blood products discussed with patient who.    Plan discussed with attending and CAA.

## 2025-07-15 NOTE — OP NOTE
Operative Note     Date: 7/15/2025  OR Location: [facility] A OR    Name: Fatmata Plummer YOB: 1955, Age: 69 y.o., MRN: [id number], Sex: female    Diagnosis  Pre-op Diagnosis      * Trimalleolar fracture of ankle, closed, right, initial encounter [S82.501A] Post-op Diagnosis     * Trimalleolar fracture of ankle, closed, right, initial encounter [S82.851A]     Procedures  Right Ankle Fracture Open Reduction Internal Fixation  48612 - SD OPEN TX TRIMALLEOLAR ANKLE FX W/O FIXJ PST LIP      Surgeons      * Jaky Gibson - Primary    Resident/Fellow/Other Assistant:  Surgeons and Role:     * Joaquín Mccauley MD - Resident - Assisting    Staff:   Kulwantulator: Dory Hurstub Person: Marija Oswald Person: Daphne  Circulator: Chai    Anesthesia Staff: Anesthesiologist: Brian Cook DO  C-AA: JONATHON Gaitan    Procedure Summary  Anesthesia: General  ASA: III  Estimated Blood Loss: 10 mL  Intra-op Medications:   Administrations occurring from 0955 to 1245 on 07/15/25:   Medication Name Total Dose   ceFAZolin (Ancef) vial 1 g 2 g   dexAMETHasone (Decadron) 4 mg/mL IV Syringe 2 mL 6 mg   fentaNYL (Sublimaze) injection 50 mcg/mL 100 mcg   ketamine injection 50 mg/ 5 mL (10 mg/mL) 60 mg   LR bolus Cannot be calculated   lidocaine (LTA Kit) for intubation 4 mL   lidocaine PF (Xylocaine-MPF) local injection 2 % 60 mg   lubricating eye drops ophthalmic solution 2 drop   midazolam PF (Versed) injection 1 mg/mL 0.5 mg   ondansetron (Zofran) 2 mg/mL injection 4 mg   propofol (Diprivan) injection 10 mg/mL 140 mg   rocuronium (ZeMuron) 50 mg/5 mL injection 50 mg   sugammadex (Bridion) 200 mg/2 mL injection 150 mg   ipratropium-albuteroL (Duo-Neb) 0.5-2.5 mg/3 mL nebulizer solution 3 mL 3 mL              Anesthesia Record               Intraprocedure I/O Totals          Intake    LR bolus 600.00 mL    Total Intake 600 mL       Output    Urine 0 mL    Est. Blood Loss 5 mL    Total Output 5 mL       Net    Net Volume 595 mL           Specimen: No specimens collected       Drains and/or Catheters: * None in log *    Tourniquet Times:     Total Tourniquet Time Documented:  Thigh (Right) - 52 minutes  Total: Thigh (Right) - 52 minutes      Implants: [place]  Implants       Type Name Action Serial No.      Screw PLATE, FIBULA, DISTAL LATERAL, 2.7/3.5MM 95MM 5H, RIGHT - SN/A - [id number] Implanted N/A     Screw SCREW, LOCKING, 2.7MM, T8, 16MM - SN/A - [id number] Implanted N/A     Screw SCREW, LOCKING, 2.7MM, T8, 18MM - SN/A - [id number] Implanted N/A     Screw SCREW, NATHAN 3.5 X 12 TI - SN/A - [id number] Implanted N/A     Screw SCREW, NATHAN 3.5 X 14 TI - SN/A - [id number] Implanted N/A     Screw SCREW, NATHAN 3.5 X 16 TI - SN/A - [id number] Implanted N/A              Findings: Trimalleolar ankle fracture; medial skin with blister, not amenable to incision    Indications: Fatmata Plummer is an 69 y.o. female who is having surgery for Trimalleolar fracture of ankle, closed, right, initial encounter [S82.663Y].     The patient was seen in the preoperative area. The risks, benefits, complications, treatment options, non-operative alternatives, expected recovery and outcomes were discussed with the patient. The possibilities of reaction to medication, pulmonary aspiration, injury to surrounding structures, bleeding, recurrent infection, the need for additional procedures, failure to diagnose a condition, and creating a complication requiring transfusion or operation were discussed with the patient. The patient concurred with the proposed plan, giving informed consent.  The site of surgery was properly noted/marked if necessary per policy. The patient has been actively warmed in preoperative area. Preoperative antibiotics have been ordered and given within 1 hours of incision. Venous thrombosis prophylaxis have been ordered including unilateral sequential compression device    Procedure Details:   This is a 69 y.o. female  who presented to clinic for  evaluation of  A Right ankle fracture.  I recommended operative treatment, in the form of a Right  ankle ORIF.  We discussed the risks and benefits of surgery, including but not limited to, bleeding, infection, damage to nerves and vessels, nonunion, malunion, hardware failure, DVT, need for further surgery, development of arthritis in the future and the risks of anesthesia.  They understood all the risks, all other questions were answered, and consent was obtained.    Details of Operative Procedure:    The patient was met in the preanesthesia holding area. Their correct Right leg was identified and marked. They were then brought to the operating room, and and transferred to the operating table.  Gen. anesthesia was then induced.  Patient then had a well-padded thigh tourniquet placed on the operative leg.  A small bump was placed under the ipsilateral hip.  The operative leg was then thoroughly cleansed with a chlorhexidine sponge and soap.  The leg was then prepped and draped in the normal sterile fashion.  Perioperative antibiotics were given.  A timeout was then completed confirming the correct patient, operative site, participants, equipment, antibiotics.  The operative extremity was then exsanguinated with an Esmarch and the tourniquet elevated to 250 mmHg.    Attention was turned to the lateral aspect of the ankle. A standard lateral incision was made over the fibula, centered at the fracture site. Sharp dissection was taken down to bone, being mindful of the superficial peroneal nerve, which was not encountered. Sub-periosteal dissection was then performed to expose the fibula.    The fracture ends were identified, and debrided with a rongeurs and curette. The fracture was then thoroughly irrigated.  The fracture ends were then reduced using a combination of reduction forceps. Reduction was confirmed with direct visualization and fluoroscopy.     A distal fibula locking plate was then chosen. This was placed  into the appropriate position, and fixed to the bone using cortical and locking screws. Again, placement was confirmed with fluoroscopy.     Attention was then turned to the medial side.  There was a fracture blister noted, this was in the area where an incision would need to be made.  The medial malleolus fracture was noted to be small, nondisplaced once the fibula was fixed, and on stress of the ankle there was no gross motion of the fracture fragment, nor any displacement of the syndesmosis..  Therefore, it was determined to treat both the posterior malleolus and medial malleolus fractures closed.    Final fluoroscopic images were then taken, ensuring proper reduction and hardware placement.     The wounds were copiously irrigated. The subcutaneous tissue closed with 3-0 monocryl, and the tourniquet was then released.  The skin incisions were closed with 3-0 nylon. The wound was dressed with adaptic, gauze, ABD, and webril. They were placed into a well-padded short-leg splint.     Evidence of Infection: No   Complications:  None; patient tolerated the procedure well.    Disposition: PACU - hemodynamically stable.  Condition: stable     Additional Details:   Post-Operative Course:   They will be Non Weight Bearing for 6 weeks.   Week 1 post-op: Splint off, NWB XRs, incision check, placement into short-leg cast  Week 3 post-op: Cast off, NWB XRs, sutures out, placement into short-leg cast   Week 6 post op: Cast off. WB XR. Tall pneumatic boot. Start WB in boot. Start PT. Wean boot as able     Attending Attestation: I was present and scrubbed for the entire procedure.    Jaky WallaceHarry S. [facility]  Phone Number: [phone]

## 2025-07-15 NOTE — PERIOPERATIVE NURSING NOTE
1226: Phase 1 care begins, post-op block to be performed by Dr. Cook  1312: Dr. Cook bedside to perform post-op block

## 2025-07-15 NOTE — BRIEF OP NOTE
Date: 7/15/2025  OR Location: [facility] OR    Name: Fatmata Plummer : 1955, Age: 69 y.o., MRN: [id number], Sex: female    Diagnosis  Pre-op Diagnosis      * Trimalleolar fracture of ankle, closed, right, initial encounter [S82.951A] Post-op Diagnosis     * Trimalleolar fracture of ankle, closed, right, initial encounter [S82.851A]     Procedures  Right Ankle Fracture Open Reduction Internal Fixation  33133 - SD OPEN TX TRIMALLEOLAR ANKLE FX W/O FIXJ PST LIP      Surgeons      * Jaky Gibson - Primary    Resident/Fellow/Other Assistant:  Surgeons and Role:     * Joaquín Mccauley MD - Resident - Assisting    Staff:   Kulwantulator: Dory Oswald Person: Marija Oswald Person: Daphne  Circulator: Chai    Anesthesia Staff: Anesthesiologist: Brian Cook DO  C-AA: JONATHON Gaitan    Procedure Summary  Anesthesia: General  ASA: III  Estimated Blood Loss: 10mL  Intra-op Medications:   Administrations occurring from 0955 to 1245 on 07/15/25:   Medication Name Total Dose   ceFAZolin (Ancef) vial 1 g 2 g   dexAMETHasone (Decadron) 4 mg/mL IV Syringe 2 mL 6 mg   fentaNYL (Sublimaze) injection 50 mcg/mL 100 mcg   ketamine injection 50 mg/ 5 mL (10 mg/mL) 60 mg   LR bolus Cannot be calculated   lidocaine (LTA Kit) for intubation 4 mL   lidocaine PF (Xylocaine-MPF) local injection 2 % 60 mg   lubricating eye drops ophthalmic solution 2 drop   midazolam PF (Versed) injection 1 mg/mL 0.5 mg   ondansetron (Zofran) 2 mg/mL injection 4 mg   propofol (Diprivan) injection 10 mg/mL 140 mg   rocuronium (ZeMuron) 50 mg/5 mL injection 50 mg   sugammadex (Bridion) 200 mg/2 mL injection 150 mg   ipratropium-albuteroL (Duo-Neb) 0.5-2.5 mg/3 mL nebulizer solution 3 mL 3 mL              Anesthesia Record               Intraprocedure I/O Totals          Intake    LR bolus 600.00 mL    Total Intake 600 mL       Output    Urine 0 mL    Est. Blood Loss 5 mL    Total Output 5 mL       Net    Net Volume 595 mL          Specimen: No specimens  collected               Findings: Consistent with preop diagnosis    Complications:  None; patient tolerated the procedure well.     Disposition: PACU - hemodynamically stable.  Condition: stable  Specimens Collected: No specimens collected  Attending Attestation: I was present and scrubbed for the entire procedure.    Jaky Gibson  Phone Number: [phone]

## 2025-07-15 NOTE — ANESTHESIA PROCEDURE NOTES
Peripheral Block    Patient location during procedure: pre-op  End time: 7/15/2025 1:28 PM  Reason for block: at surgeon's request and post-op pain management  Staffing  Performed: attending   Authorized by: Brian Cook DO    Performed by: Brian Cook DO  Preanesthetic Checklist  Completed: patient identified, IV checked, site marked, risks and benefits discussed, surgical consent, monitors and equipment checked, pre-op evaluation and timeout performed   Timeout performed at:   Peripheral Block  Patient position: sitting  Prep: ChloraPrep  Patient monitoring: continuous pulse ox  Block type: sciatic and adductor canal  Laterality: right  Injection technique: single-shot  Guidance: nerve stimulator and ultrasound guided  Local infiltration: ropivacaine  Infiltration strength: 0.5 %  Dose: 25 mL  Needle  Needle type: pencil-tip   Needle gauge: 22 G  Needle length: 8 cm  Needle localization: nerve stimulator and ultrasound guidance  Assessment  Injection assessment: no paresthesia on injection, negative aspiration for heme, incremental injection and local visualized surrounding nerve on ultrasound  Paresthesia pain: none  Heart rate change: no  Slow fractionated injection: yes

## 2025-07-15 NOTE — DISCHARGE INSTRUCTIONS
"At-Home Instructions - Dr. Gibson    Date: 7/15/2025   Surgery: R ankle ORIF      Cast/Splint/Dressing Care Instructions  1) Keep cast/dressing clean and dry.  2) May bathe - but cast/dressing must remain dry. You may purchase a \"cast cover\" online or at most outpatient pharmacies like [facility] or [employer].   3) Should the cast become wet, you need to call your physician's clinic immediately for cast removal and replacement.  Moisture can cause skin breakdown and lead to infection if left untreated.  4) Do not stick any sharp object down the cast in order to itch, as this can cause scrapes/cuts/punctures which can lead to infection.  5) Observe for increasing pain in the extremity with the cast, finger/toe-tips turning blue/purple, or numbness and tingling in your toes/fingers.  Should any of these symptoms arise, you need to be seen immediately for evaluation of swelling and increasing compartment pressures within your affected extremity.  6) Keep your surgical extremity elevated - \"Toes Above your Nose\"  - This is key in the first two weeks after surgery to minimize swelling.  7) You may ice your extremity, being careful to prevent melting ice from saturating into the splint/cast. The best place to apply ice if you're in a splint is behind the knee.     Activity    You must remain non-weight bearing on your operative (right leg) extremity.  Use crutches or a walker for ambulation.  No driving while on narcotic pain medication.    Do not get your dressing/cast/splint wet!    Discharge Pain Medications  You will be given a prescription for pain medication. You should start taking this the same day after your surgery.  Wean off as tolerated.  Do not wait to take the pain medication until the pain is severe, as it will be difficult to \"catch up\" once this occurs.      The pain medication usually reaches its full effect ~1 hour after ingesting.     If you have been sent home on Colace, this medication should be " taken until you are off all narcotic (i.e. Vicodin, Percocet, Oxycodone, etc) pain medications, in order to prevent constipation.  Senna and MiraLax are other over-the-counter medications that you can take to help with post-operative constipation which is usually related to the prescription pain medication. Make sure you drink plenty of water and eat a healthy diet.     Percocet or Norco have Tylenol in their ingredient lists.  You must be careful not to exceed 3,000mg (3 grams) of Tylenol, from all sources, within a single 24-hr period.      Some common side effects of the narcotic pain medications (Percocet, Oxycodone, Norco, etc) include nausea and itching.  Benadryl is a great over the counter medication that helps calm your stomach, decreases your anxiety levels, and minimizes the itching.  You can easily purchase this at your local pharmacy as an over-the-counter medication.  Please abide by the instructions as printed on the bottle.  If your nausea persists, make sure to take small amounts of crackers or other lighter foods.    Follow-Up/Emergency Contacts  Follow-up with Dr. Jaky Gibson's office, as scheduled.    Please call [phone] for an appointment if one has not been scheduled. Follow up 1 week after surgery.    If you have any concerns, please call [phone].    Blood Clot Prophylaxis  You will need to take ASA (Aspirin) 325 mg twice daily for 6 weeks after surgery. You will be provided a prescription, but can also purchase this over the counter.

## 2025-07-16 ENCOUNTER — PHARMACY VISIT (OUTPATIENT)
Dept: PHARMACY | Facility: CLINIC | Age: 70
End: 2025-07-16
Payer: COMMERCIAL

## 2025-07-16 ENCOUNTER — HOME HEALTH ADMISSION (OUTPATIENT)
Dept: HOME HEALTH SERVICES | Facility: HOME HEALTH | Age: 70
End: 2025-07-16
Payer: MEDICARE

## 2025-07-16 ENCOUNTER — DOCUMENTATION (OUTPATIENT)
Dept: HOME HEALTH SERVICES | Facility: HOME HEALTH | Age: 70
End: 2025-07-16
Payer: MEDICARE

## 2025-07-16 VITALS
HEIGHT: 62 IN | TEMPERATURE: 98.6 F | DIASTOLIC BLOOD PRESSURE: 59 MMHG | HEART RATE: 105 BPM | RESPIRATION RATE: 18 BRPM | SYSTOLIC BLOOD PRESSURE: 121 MMHG | WEIGHT: 138.89 LBS | BODY MASS INDEX: 25.56 KG/M2 | OXYGEN SATURATION: 94 %

## 2025-07-16 LAB
ANION GAP SERPL CALC-SCNC: 15 MMOL/L (ref 10–20)
BUN SERPL-MCNC: 17 MG/DL (ref 6–23)
CALCIUM SERPL-MCNC: 8.4 MG/DL (ref 8.6–10.3)
CHLORIDE SERPL-SCNC: 101 MMOL/L (ref 98–107)
CO2 SERPL-SCNC: 24 MMOL/L (ref 21–32)
CREAT SERPL-MCNC: 1.01 MG/DL (ref 0.5–1.05)
EGFRCR SERPLBLD CKD-EPI 2021: 61 ML/MIN/1.73M*2
ERYTHROCYTE [DISTWIDTH] IN BLOOD BY AUTOMATED COUNT: 14 % (ref 11.5–14.5)
GLUCOSE SERPL-MCNC: 102 MG/DL (ref 74–99)
HCT VFR BLD AUTO: 27.8 % (ref 36–46)
HGB BLD-MCNC: 8.3 G/DL (ref 12–16)
MCH RBC QN AUTO: 28.7 PG (ref 26–34)
MCHC RBC AUTO-ENTMCNC: 29.9 G/DL (ref 32–36)
MCV RBC AUTO: 96 FL (ref 80–100)
NRBC BLD-RTO: 0 /100 WBCS (ref 0–0)
PLATELET # BLD AUTO: 281 X10*3/UL (ref 150–450)
POTASSIUM SERPL-SCNC: 4.2 MMOL/L (ref 3.5–5.3)
RBC # BLD AUTO: 2.89 X10*6/UL (ref 4–5.2)
SODIUM SERPL-SCNC: 136 MMOL/L (ref 136–145)
WBC # BLD AUTO: 10 X10*3/UL (ref 4.4–11.3)

## 2025-07-16 PROCEDURE — 94640 AIRWAY INHALATION TREATMENT: CPT | Mod: MUEWO

## 2025-07-16 PROCEDURE — 7100000011 HC EXTENDED STAY RECOVERY HOURLY - NURSING UNIT

## 2025-07-16 PROCEDURE — 2500000001 HC RX 250 WO HCPCS SELF ADMINISTERED DRUGS (ALT 637 FOR MEDICARE OP)

## 2025-07-16 PROCEDURE — 97161 PT EVAL LOW COMPLEX 20 MIN: CPT | Mod: GP

## 2025-07-16 PROCEDURE — 2500000005 HC RX 250 GENERAL PHARMACY W/O HCPCS

## 2025-07-16 PROCEDURE — 2500000002 HC RX 250 W HCPCS SELF ADMINISTERED DRUGS (ALT 637 FOR MEDICARE OP, ALT 636 FOR OP/ED): Performed by: ORTHOPAEDIC SURGERY

## 2025-07-16 PROCEDURE — 85027 COMPLETE CBC AUTOMATED: CPT

## 2025-07-16 PROCEDURE — 2500000004 HC RX 250 GENERAL PHARMACY W/ HCPCS (ALT 636 FOR OP/ED)

## 2025-07-16 PROCEDURE — 82374 ASSAY BLOOD CARBON DIOXIDE: CPT

## 2025-07-16 PROCEDURE — 36415 COLL VENOUS BLD VENIPUNCTURE: CPT

## 2025-07-16 PROCEDURE — 97165 OT EVAL LOW COMPLEX 30 MIN: CPT | Mod: GO

## 2025-07-16 RX ADMIN — ATORVASTATIN CALCIUM 20 MG: 20 TABLET, FILM COATED ORAL at 09:54

## 2025-07-16 RX ADMIN — Medication 4 L/MIN: at 07:11

## 2025-07-16 RX ADMIN — PANTOPRAZOLE SODIUM 40 MG: 40 TABLET, DELAYED RELEASE ORAL at 06:04

## 2025-07-16 RX ADMIN — ASPIRIN 325 MG: 325 TABLET ORAL at 09:54

## 2025-07-16 RX ADMIN — CEFAZOLIN SODIUM 2 G: 2 SOLUTION INTRAVENOUS at 02:58

## 2025-07-16 RX ADMIN — IPRATROPIUM BROMIDE AND ALBUTEROL SULFATE 3 ML: 2.5; .5 SOLUTION RESPIRATORY (INHALATION) at 11:50

## 2025-07-16 RX ADMIN — HYDROCODONE BITARTRATE AND ACETAMINOPHEN 2 TABLET: 5; 325 TABLET ORAL at 06:11

## 2025-07-16 RX ADMIN — DULOXETINE 60 MG: 30 CAPSULE, DELAYED RELEASE ORAL at 09:54

## 2025-07-16 RX ADMIN — LEVOTHYROXINE SODIUM 25 MCG: 0.03 TABLET ORAL at 06:04

## 2025-07-16 RX ADMIN — Medication 4 L/MIN: at 11:50

## 2025-07-16 RX ADMIN — BUDESONIDE 0.5 MG: 0.5 INHALANT RESPIRATORY (INHALATION) at 07:11

## 2025-07-16 RX ADMIN — HYDROCODONE BITARTRATE AND ACETAMINOPHEN 2 TABLET: 5; 325 TABLET ORAL at 11:31

## 2025-07-16 RX ADMIN — IPRATROPIUM BROMIDE AND ALBUTEROL SULFATE 3 ML: 2.5; .5 SOLUTION RESPIRATORY (INHALATION) at 07:11

## 2025-07-16 RX ADMIN — GABAPENTIN 900 MG: 300 CAPSULE ORAL at 09:54

## 2025-07-16 RX ADMIN — HYDROCODONE BITARTRATE AND ACETAMINOPHEN 2 TABLET: 5; 325 TABLET ORAL at 00:43

## 2025-07-16 SDOH — ECONOMIC STABILITY: FOOD INSECURITY: WITHIN THE PAST 12 MONTHS, YOU WORRIED THAT YOUR FOOD WOULD RUN OUT BEFORE YOU GOT THE MONEY TO BUY MORE.: NEVER TRUE

## 2025-07-16 SDOH — HEALTH STABILITY: MENTAL HEALTH: HOW OFTEN DO YOU HAVE SIX OR MORE DRINKS ON ONE OCCASION?: NEVER

## 2025-07-16 SDOH — HEALTH STABILITY: MENTAL HEALTH: HOW OFTEN DO YOU HAVE A DRINK CONTAINING ALCOHOL?: NEVER

## 2025-07-16 SDOH — ECONOMIC STABILITY: FOOD INSECURITY: WITHIN THE PAST 12 MONTHS, THE FOOD YOU BOUGHT JUST DIDN'T LAST AND YOU DIDN'T HAVE MONEY TO GET MORE.: NEVER TRUE

## 2025-07-16 SDOH — ECONOMIC STABILITY: INCOME INSECURITY: IN THE PAST 12 MONTHS HAS THE ELECTRIC, GAS, OIL, OR WATER COMPANY THREATENED TO SHUT OFF SERVICES IN YOUR HOME?: NO

## 2025-07-16 SDOH — HEALTH STABILITY: MENTAL HEALTH: HOW MANY DRINKS CONTAINING ALCOHOL DO YOU HAVE ON A TYPICAL DAY WHEN YOU ARE DRINKING?: PATIENT DOES NOT DRINK

## 2025-07-16 SDOH — ECONOMIC STABILITY: HOUSING INSECURITY: AT ANY TIME IN THE PAST 12 MONTHS, WERE YOU HOMELESS OR LIVING IN A SHELTER (INCLUDING NOW)?: NO

## 2025-07-16 SDOH — SOCIAL STABILITY: SOCIAL INSECURITY: ARE YOU MARRIED, WIDOWED, DIVORCED, SEPARATED, NEVER MARRIED, OR LIVING WITH A PARTNER?: MARRIED

## 2025-07-16 SDOH — ECONOMIC STABILITY: HOUSING INSECURITY: IN THE PAST 12 MONTHS, HOW MANY TIMES HAVE YOU MOVED WHERE YOU WERE LIVING?: 0

## 2025-07-16 SDOH — ECONOMIC STABILITY: FOOD INSECURITY: HOW HARD IS IT FOR YOU TO PAY FOR THE VERY BASICS LIKE FOOD, HOUSING, MEDICAL CARE, AND HEATING?: NOT HARD AT ALL

## 2025-07-16 SDOH — ECONOMIC STABILITY: HOUSING INSECURITY: IN THE LAST 12 MONTHS, WAS THERE A TIME WHEN YOU WERE NOT ABLE TO PAY THE MORTGAGE OR RENT ON TIME?: NO

## 2025-07-16 SDOH — ECONOMIC STABILITY: TRANSPORTATION INSECURITY: IN THE PAST 12 MONTHS, HAS LACK OF TRANSPORTATION KEPT YOU FROM MEDICAL APPOINTMENTS OR FROM GETTING MEDICATIONS?: NO

## 2025-07-16 ASSESSMENT — COGNITIVE AND FUNCTIONAL STATUS - GENERAL
DRESSING REGULAR LOWER BODY CLOTHING: A LITTLE
CLIMB 3 TO 5 STEPS WITH RAILING: TOTAL
TOILETING: A LITTLE
WALKING IN HOSPITAL ROOM: TOTAL
HELP NEEDED FOR BATHING: A LITTLE
WALKING IN HOSPITAL ROOM: A LOT
STANDING UP FROM CHAIR USING ARMS: A LITTLE
CLIMB 3 TO 5 STEPS WITH RAILING: A LOT
EATING MEALS: A LITTLE
HELP NEEDED FOR BATHING: A LOT
DAILY ACTIVITIY SCORE: 19
DAILY ACTIVITIY SCORE: 16
PERSONAL GROOMING: A LITTLE
DRESSING REGULAR UPPER BODY CLOTHING: A LITTLE
DRESSING REGULAR UPPER BODY CLOTHING: A LITTLE
MOBILITY SCORE: 16
MOVING FROM LYING ON BACK TO SITTING ON SIDE OF FLAT BED WITH BEDRAILS: A LITTLE
MOVING TO AND FROM BED TO CHAIR: A LITTLE
MOBILITY SCORE: 15
STANDING UP FROM CHAIR USING ARMS: A LITTLE
DRESSING REGULAR LOWER BODY CLOTHING: A LOT
MOVING TO AND FROM BED TO CHAIR: A LOT
TURNING FROM BACK TO SIDE WHILE IN FLAT BAD: A LITTLE
TOILETING: A LOT

## 2025-07-16 ASSESSMENT — ACTIVITIES OF DAILY LIVING (ADL)
ADL_ASSISTANCE: INDEPENDENT
LACK_OF_TRANSPORTATION: NO
ADL_ASSISTANCE: INDEPENDENT
BATHING_ASSISTANCE: MODERATE

## 2025-07-16 ASSESSMENT — PAIN DESCRIPTION - ORIENTATION: ORIENTATION: RIGHT

## 2025-07-16 ASSESSMENT — PAIN - FUNCTIONAL ASSESSMENT
PAIN_FUNCTIONAL_ASSESSMENT: 0-10

## 2025-07-16 ASSESSMENT — PAIN DESCRIPTION - DESCRIPTORS
DESCRIPTORS: ACHING;NAGGING;BURNING
DESCRIPTORS: DULL;DISCOMFORT

## 2025-07-16 ASSESSMENT — PAIN DESCRIPTION - LOCATION: LOCATION: ANKLE

## 2025-07-16 ASSESSMENT — PAIN SCALES - GENERAL
PAINLEVEL_OUTOF10: 8
PAINLEVEL_OUTOF10: 1
PAINLEVEL_OUTOF10: 4
PAINLEVEL_OUTOF10: 5 - MODERATE PAIN
PAINLEVEL_OUTOF10: 5 - MODERATE PAIN
PAINLEVEL_OUTOF10: 9

## 2025-07-16 ASSESSMENT — LIFESTYLE VARIABLES
AUDIT-C TOTAL SCORE: 0
SKIP TO QUESTIONS 9-10: 1

## 2025-07-16 NOTE — PROGRESS NOTES
"Orthopaedic Surgery Progress Note    Subjective: NAEON. Pain well controlled considering recent surgery. Nerve block still mostly in effect. Denies chest pain, shortness of breath, or fevers.    Objective:  /52   Pulse 69   Temp 36.8 °C (98.2 °F)   Resp 16   Ht 1.575 m (5' 2\")   Wt 63 kg (138 lb 14.2 oz)   SpO2 98%   Breastfeeding No   BMI 25.40 kg/m²     Gen: arousable, NAD, appropriately conversational  Cardiac: RRR to peripheral palpation  Resp: nonlabored on RA  GI: soft, nondistended    MSK:  Right lower Extremity:    -Post-operative splint in place without strikethrough bleeding, clean dry and intact.   -Motor intact in digits  -SILT in digits, decreased due to nerve block  -Digits wwp, brisk cap refill  -Compartments soft and compressible, no pain with passive dorsiflexion      Assessment/Plan: 69 y.o. female s/p R ankle ORIF on 7/15/25 with Dr. Wallace.      Plan:  - Weight bearing: NWB RLE  - DVT ppx: SCDs,  bid  - Diet: Regular  - Pain: Tylenol, oxycodone 5/10, dilaudid breakthrough  - Antibiotics: perioperative ancef 2g q8hr x3 doses  - FEN: HLIV with good PO intake  - Bowel Regimen: Miralax, senna, dulcolax  - PT/OT  - Pulm: Encourage IS  - Continue home medications  - No sellers    Dispo: Pending PT, anticipate discharge home today    Joaquín Mccauley MD  Orthopaedic Surgery PGY-3  Available by Epic Chat    "

## 2025-07-16 NOTE — PROGRESS NOTES
Occupational Therapy    Evaluation    Patient Name: Fatmata Plummer  MRN: [id number]  Department: [facility]   Room: [facility]  Today's Date: 7/16/2025  Time Calculation  Start Time: 0846  Stop Time: 0902  Time Calculation (min): 16 min        Assessment:  OT Assessment: Pt presents with deficits in self care, fxnl mob, transfers and balance with new NWB status. Pt would benefit from mod intenisty OT at [facility] however is not interested. I would recommend low intensity OT with 24/7 physical assistance.  Prognosis: Good  Barriers to Discharge Home: No anticipated barriers  Evaluation/Treatment Tolerance: Patient tolerated treatment well  Medical Staff Made Aware: Yes  End of Session Communication: Bedside nurse  End of Session Patient Position: Up in chair, Alarm on  OT Assessment Results: Decreased endurance, Decreased ADL status, Decreased functional mobility  Prognosis: Good  Barriers to Discharge: None  Evaluation/Treatment Tolerance: Patient tolerated treatment well  Medical Staff Made Aware: Yes  Strengths: Ability to acquire knowledge, Attitude of self, Support of Caregivers, Support and attitude of living partners  Barriers to Participation: Comorbidities, Housing layout  Plan:  Treatment Interventions: ADL retraining, Functional transfer training, Endurance training, Cognitive reorientation, Patient/family training  OT Frequency: 3 times per week  OT Discharge Recommendations: Low intensity level of continued care (24/7 assist)  Equipment Recommended upon Discharge: Wheeled walker, Wheelchair  OT Recommended Transfer Status: Minimal assist, Assist of 1  OT - OK to Discharge: Yes (POC established)  Treatment Interventions: ADL retraining, Functional transfer training, Endurance training, Cognitive reorientation, Patient/family training    Subjective   Current Problem:  1. Ankle fracture, bimalleolar, closed, right, initial encounter  aspirin 325 mg tablet    HYDROcodone-acetaminophen (Norco) 5-325 mg tablet    docusate  sodium (Colace) 100 mg capsule    ondansetron (Zofran) 4 mg tablet      2. Trimalleolar fracture of ankle, closed, right, initial encounter  FL less than 1 hour    FL less than 1 hour        OT Visit Info:  OT Received On: 07/16/25  General:  General  Reason for Referral: Pt is a 68 yo female POD #1 s/p ORIF of trimalleolar fracture. Pt reports has been NWB since fall/injury on 7/5.  Referred By: Joaquín Mccauley MD  Past Medical History Relevant to Rehab: Medical History[1]  Family/Caregiver Present: No  Co-Treatment: PT  Co-Treatment Reason: for maximal pt safety and participation  Prior to Session Communication: Bedside nurse  Patient Position Received: Bed, 3 rail up, Alarm on  General Comment: Pt pleasant and agreeable to PT/OT evaluations  Precautions:  LE Weight Bearing Status: Right Non-Weight Bearing  Medical Precautions: Fall precautions  Pain:  Pain Assessment  0-10 (Numeric) Pain Score: 5 - Moderate pain  Pain Type: Surgical pain  Pain Location: Foot  Pain Orientation: Right    Objective   Cognition:  Overall Cognitive Status: Within Functional Limits  Orientation Level: Oriented X4     Home Living:  Type of Home: House  Lives With: Spouse  Home Adaptive Equipment:  (standard walker and transport wc)  Home Layout: Multi-level (Pt reports staying on main level of home with half bathroom and supportive chair to sleep in. Flight of stairs to 2nd lvl bed and full bathroom (tub))  Home Access: Stairs to enter without rails  Entrance Stairs-Rails: None  Entrance Stairs-Number of Steps: 1  Bathroom Shower/Tub: Tub/shower unit  Bathroom Toilet: Standard  Bathroom Equipment: Bedside commode  Prior Function:  Level of Dickenson: Independent with ADLs and functional transfers, Independent with homemaking with ambulation  Receives Help From: Family  ADL Assistance: Independent (independent prior to fall/fracture at start of July.  assists since injury)  Homemaking Assistance: Independent  Ambulatory Assistance:  Independent (independent with no device prior to fall/fracture at start of July.  assists with transfers to wheelchair since injury (non ambulatory))  Prior Function Comments: x1 fall related to current admission  ADL:  Eating Assistance: Independent  Grooming Assistance: Stand by  Grooming Deficit: Setup  Bathing Assistance: Moderate  Bathing Deficit: Left lower leg including foot, Right lower leg including foot, Left upper leg, Buttocks, Right upper leg, Perineal area  UE Dressing Assistance: Stand by  UE Dressing Deficit: Setup  LE Dressing Assistance: Maximal  LE Dressing Deficit: Thread LLE into underwear, Thread RLE into underwear, Thread LLE into pants, Thread RLE into pants, Don/doff R sock, Don/doff L sock, Pull up over hips  Toileting Assistance with Device: Maximal  Toileting Deficit: Perineal hygiene, Clothing management down, Clothing management up  Activity Tolerance:  Endurance: Tolerates 10 - 20 min exercise with multiple rests  Bed Mobility/Transfers: Bed Mobility 1  Bed Mobility 1: Supine to sitting  Level of Assistance 1: Modified independent  Bed Mobility Comments 1: Increased time to complete/manage RLE    Transfer 1  Transfer From 1: Sit to  Transfer to 1: Stand  Technique 1: Sit to stand, Stand to sit  Transfer Device 1: Gait belt, Walker  Transfer Level of Assistance 1: Minimum assistance  Trials/Comments 1: cues for RLE positioning, pt with poor ability to follow instructions for use of RW, reports  has been transferring her and she holds onto him. Able to slightly pivot on LLE to chair with arms    Sitting Balance:  Static Sitting Balance  Static Sitting-Balance Support: Feet supported  Static Sitting-Level of Assistance: Close supervision  Standing Balance:  Static Standing Balance  Static Standing-Balance Support: Bilateral upper extremity supported  Static Standing-Level of Assistance: Minimum assistance   Sensation:  Light Touch: No apparent  deficits  Coordination:  Movements are Fluid and Coordinated: Yes   Hand Function:  Gross Grasp: Functional  Coordination: Functional  Extremities: RUE   RUE : Within Functional Limits and LUE   LUE: Within Functional Limits    Outcome Measures:[facility] Daily Activity  Putting on and taking off regular lower body clothing: A lot  Bathing (including washing, rinsing, drying): A lot  Putting on and taking off regular upper body clothing: A little  Toileting, which includes using toilet, bedpan or urinal: A lot  Taking care of personal grooming such as brushing teeth: A little  Eating Meals: None  Daily Activity - Total Score: 16    Education Documentation  Body Mechanics, taught by Milvia Avila OT at 7/16/2025 10:04 AM.  Learner: Patient  Readiness: Acceptance  Method: Explanation  Response: Verbalizes Understanding  Comment: transfers, WB status    Precautions, taught by Milvia Avila OT at 7/16/2025 10:04 AM.  Learner: Patient  Readiness: Acceptance  Method: Explanation  Response: Verbalizes Understanding  Comment: transfers, WB status    ADL Training, taught by Milvia Avila OT at 7/16/2025 10:04 AM.  Learner: Patient  Readiness: Acceptance  Method: Explanation  Response: Verbalizes Understanding  Comment: transfers, WB status    Education Comments  No comments found.      Goals:  Encounter Problems       Encounter Problems (Active)       ADLs       Patient with complete lower body dressing with supervision level of assistance donning and doffing all LE clothes  with PRN adaptive equipment while edge of bed        Start:  07/16/25    Expected End:  07/30/25            Patient will complete toileting including hygiene clothing management/hygiene with supervision level of assistance and bedside commode.       Start:  07/16/25    Expected End:  07/30/25               TRANSFERS       Patient will complete functional transfer to commode/ bed/ chair with arms with least restrictive device with supervision level of  assistance while maintaining WB status.        Start:  07/16/25    Expected End:  07/30/25                                     [1]   Past Medical History:  Diagnosis Date    Allergic 1991    used to carry epi pen for bee sting allergy, doesn't any more    Anxiety 2000    Cataract 2019    Cerebral vascular accident (Multi) 2000    no residual    COPD (chronic obstructive pulmonary disease) (Multi) 2014    Depression 2000    Emphysema of lung (Multi) 2014    GERD (gastroesophageal reflux disease) 2014    uunder control    Hematologic malignancy (Multi) 2009    Hodgkins lymphoma    Herpes zoster 07/16/2021    History of blood transfusion     after childbirth and with Hodgkins ;lymphoma tx    Hodgkin lymphoma, unspecified, unspecified site (Multi) 12/04/2013    s/p chemo & radiation    Hyperlipidemia 2000    Hypothyroidism 2022    Lung cancer (Multi) 2023    s/p surgery with no fuurthher treatment    Migraine headache 2009    Neuromuscular disorder (Multi)     On home oxygen therapy 2014    4 L NC    PAD (peripheral artery disease)     Peripheral neuropathy     chemo induuced - feet

## 2025-07-16 NOTE — PROGRESS NOTES
07/16/25 1346   Discharge Planning   Living Arrangements Spouse/significant other   Support Systems Spouse/significant other   Assistance Needed none   Type of Residence Private residence   Number of Stairs to Enter Residence 1   Number of Stairs Within Residence 0   Do you have animals or pets at home? Yes   Type of Animals or Pets one dog   Who is requesting discharge planning? Provider   Home or Post Acute Services In home services   Type of Home Care Services Home OT;Home PT   Expected Discharge Disposition Home H   Does the patient need discharge transport arranged? No   Financial Resource Strain   How hard is it for you to pay for the very basics like food, housing, medical care, and heating? Not hard   Housing Stability   In the last 12 months, was there a time when you were not able to pay the mortgage or rent on time? N   In the past 12 months, how many times have you moved where you were living? 0   At any time in the past 12 months, were you homeless or living in a shelter (including now)? N   Transportation Needs   In the past 12 months, has lack of transportation kept you from medical appointments or from getting medications? no   In the past 12 months, has lack of transportation kept you from meetings, work, or from getting things needed for daily living? No   Patient Choice   Provider Choice list and CMS website (https://medicare.gov/care-compare#search) for post-acute Quality and Resource Measure Data were provided and reviewed with: Patient;Family   Patient / Family choosing to utilize agency / facility established prior to hospitalization No   Stroke Family Assessment   Stroke Family Assessment Needed No   Intensity of Service   Intensity of Service 0-30 min     7/16/25 1349  Met with patient and  at bedside to discuss discharge planning.   PCP is Dr Covarrubias and she will use meds to beds.  Patient lives at home with her  in a house.  She is independent with ADLs.  Her   provides her transportation.  She does not use any assistive devices for ambulation.  She has a walker at home.  She would like a wheelchair as well.  Order and clinical sent to [facility] in [facility].  Patient would also like to have HHC for PT/OT.  Explained HHC options and expectations.  Patient would like to use [facility].   Patient denies any additional HHC, DME, or discharge needs.  She will notify the [facility] should any needs arise.  Waiting on confirmation of SOC from [facility].  ADOD today.  Janene Gasca RN TCC    7/16/25 1426  [facility] confirmed SOC for 7/17/25.  [facility] will follow up with patient for delivery of wheelchair.  Janene Gasca RN TCC

## 2025-07-16 NOTE — CARE PLAN
The patient's goals for the shift include      The clinical goals for the shift include pt to remain safe throughout this shift      Problem: Pain - Adult  Goal: Verbalizes/displays adequate comfort level or baseline comfort level  Outcome: Met     Problem: Safety - Adult  Goal: Free from fall injury  Outcome: Met     Problem: Discharge Planning  Goal: Discharge to home or other facility with appropriate resources  Outcome: Met     Problem: Chronic Conditions and Co-morbidities  Goal: Patient's chronic conditions and co-morbidity symptoms are monitored and maintained or improved  Outcome: Met     Problem: Nutrition  Goal: Nutrient intake appropriate for maintaining nutritional needs  Outcome: Met

## 2025-07-16 NOTE — DISCHARGE INSTR - APPOINTMENTS
[facility] [phone]  ORDER FORM AND CLINICAL INFORMATION SENT TO [facility] TO ORDER WHEELCHAIR.  THEY WILL REACH OUT TO YOU ABOUT INSURANCE APPROVAL AND DELIVERY

## 2025-07-16 NOTE — PROGRESS NOTES
Physical Therapy    Physical Therapy Evaluation    Patient Name: Fatmata Plummer  MRN: [id number]  Department: [place] [address]  Room: [facility]  Today's Date: 7/16/2025   Time Calculation  Start Time: 0845  Stop Time: 0903  Time Calculation (min): 18 min    Assessment/Plan   PT Assessment  PT Assessment Results: Decreased strength, Decreased endurance, Impaired balance, Decreased mobility  Rehab Prognosis: Good  Barriers to Discharge Home: No anticipated barriers (Spouse able to provide 24/7 supervision and assistance. Has been assisting since fall >1 week ago)  Evaluation/Treatment Tolerance: Patient tolerated treatment well, Patient limited by pain  Medical Staff Made Aware: Yes  Strengths: Ability to acquire knowledge, Attitude of self, Support of Caregivers, Support and attitude of living partners  Barriers to Participation: Comorbidities, Housing layout  End of Session Communication: Bedside nurse  Assessment Comment: Pt s/p R ankle ORIF with NWB precautions now demonstrating deficits in generalized strength, endurance and balance as well as increased pain resulting in impaired functional mobility, gait and self care. Due to the impairments listed above, pt would benefit from skilled physical therapy services in acute care setting as well as additional therapy in LOW intensity setting with 24/7 supervision and assistance  End of Session Patient Position: Up in chair, Alarm on  IP OR SWING BED PT PLAN  Inpatient or Swing Bed: Inpatient  PT Plan  Treatment/Interventions: Bed mobility, Transfer training, Gait training, Stair training, Balance training, Neuromuscular re-education, Strengthening, Endurance training, Therapeutic exercise, Therapeutic activity, Home exercise program  PT Plan: Ongoing PT  PT Frequency: 3 times per week  PT Discharge Recommendations: Low intensity level of continued care (24/7 assistance)  Equipment Recommended upon Discharge:  (owns transport wc and FWW)  PT Recommended Transfer Status: Assist  x1  PT - OK to Discharge: Yes (PT POC initiated this date)    Subjective     PT Visit Info:  PT Received On: 07/16/25  General Visit Information:  General  Reason for Referral: Pt is a 70 yo female POD #1 s/p ORIF of trimalleolar fracture. Pt reports has been NWB since fall/injury on 7/5.  Referred By: Joaquín Mccauley MD  Past Medical History Relevant to Rehab: Medical History[1]  Family/Caregiver Present: No  Co-Treatment: OT  Co-Treatment Reason: for maximal pt safety and participation  Prior to Session Communication: Bedside nurse  Patient Position Received: Bed, 3 rail up, Alarm on  General Comment: Pt pleasant and agreeable to PT/OT evaluations  Home Living:  Home Living  Type of Home: House  Lives With: Spouse  Home Adaptive Equipment:  (standard walker and transport wc)  Home Layout: Multi-level (Pt reports staying on main level of home with half bathroom and supportive chair to sleep in. Flight of stairs to 2nd lvl bed and full bathroom (tub))  Home Access: Stairs to enter without rails  Entrance Stairs-Rails: None  Entrance Stairs-Number of Steps: 1 (Pt reports  has been bumping her up/down stair with wc since fall)  Bathroom Shower/Tub: Tub/shower unit  Bathroom Toilet: Standard  Bathroom Equipment: Bedside commode  Prior Level of Function:  Prior Function Per Pt/Caregiver Report  Level of Dixon: Independent with ADLs and functional transfers, Independent with homemaking with ambulation  Receives Help From: Family  ADL Assistance: Independent (independent prior to fall/fracture at start of July.  assists since injury)  Homemaking Assistance: Independent (independent prior to fall/fracture at start of July.  completes all since injury)  Ambulatory Assistance: Independent (independent with no device prior to fall/fracture at start of July.  assists with transfers to wheelchair since injury (non ambulatory))  Prior Function Comments: x1 fall related to current  admission  Precautions:  Precautions  LE Weight Bearing Status: Right Non-Weight Bearing  Medical Precautions: Fall precautions             Objective   Pain:  Pain Assessment  Pain Assessment: 0-10  0-10 (Numeric) Pain Score: 5 - Moderate pain  Pain Type: Surgical pain  Pain Location: Foot  Pain Orientation: Right  Pain Interventions: Repositioned, Elevated  Cognition:  Cognition  Overall Cognitive Status: Within Functional Limits  Orientation Level: Oriented X4  Attention: Within Functional Limits  Memory: Within Funtional Limits  Insight: Mild  Impulsive: Within functional limits    General Assessments:  Activity Tolerance  Endurance: Tolerates 10 - 20 min exercise with multiple rests    Sensation  Light Touch: No apparent deficits    Coordination  Movements are Fluid and Coordinated: No  Coordination Comment: Notable dyskinesia-like movements of trunk    Static Sitting Balance  Static Sitting-Balance Support: No upper extremity supported, Feet supported  Static Sitting-Level of Assistance: Modified independent    Static Standing Balance  Static Standing-Balance Support: Bilateral upper extremity supported (FWW)  Static Standing-Level of Assistance: Contact guard  Static Standing-Comment/Number of Minutes: Maintaining RLE NWB. ~30 seconds  Functional Assessments:  Bed Mobility  Bed Mobility: Yes  Bed Mobility 1  Bed Mobility 1: Supine to sitting  Level of Assistance 1: Modified independent  Bed Mobility Comments 1: Increased time to complete/manage RLE    Transfers  Transfer: Yes  Transfer 1  Transfer From 1: Sit to  Transfer to 1: Stand  Technique 1: Sit to stand, Stand to sit  Transfer Device 1: Gait belt, Walker  Transfer Level of Assistance 1: Minimum assistance  Trials/Comments 1: cues for RLE positioning, pt with poor ability to follow instructions for use of RW, reports  has been transferring her and she holds onto him. Able to slightly pivot on LLE to chair with arms    Ambulation/Gait  Training  Ambulation/Gait Training Performed: Yes  Ambulation/Gait Training 1  Surface 1: Level tile  Device 1: Rolling walker  Gait Support Devices: Gait belt  Assistance 1: Minimum assistance  Quality of Gait 1:  (Hop to sequencing maintaining RLE NWB using FWW. Assist for balance)  Comments/Distance (ft) 1: 2ft  Extremity/Trunk Assessments:  RLE   RLE : Exceptions to WFL (grossly WFL (not assessed at ankle due to NWB))  LLE   LLE : Within Functional Limits  Outcome Measures:  [facility] Basic Mobility  Turning from your back to your side while in a flat bed without using bedrails: None  Moving from lying on your back to sitting on the side of a flat bed without using bedrails: None  Moving to and from bed to chair (including a wheelchair): A little  Standing up from a chair using your arms (e.g. wheelchair or bedside chair): A little  To walk in hospital room: Total  Climbing 3-5 steps with railing: Total  Basic Mobility - Total Score: 16    Encounter Problems       Encounter Problems (Active)       Mobility       STG - Patient will ambulate       Start:  07/16/25    Expected End:  07/30/25       Mod Ind maintaining RLE NWB using LRAD >3ft (bed<>chair distances)            PT Transfers       STG - Patient will perform bed mobility       Start:  07/16/25    Expected End:  07/30/25       Mod Ind         STG - Patient will transfer sit to and from stand       Start:  07/16/25    Expected End:  07/30/25       Mod Ind (maintaining RLE NWB)                Education Documentation  Body Mechanics, taught by Vishal Avendano, PT at 7/16/2025 10:57 AM.  Learner: Patient  Readiness: Acceptance  Method: Explanation  Response: Verbalizes Understanding  Comment: see above    Precautions, taught by Vishal Avendano, PT at 7/16/2025 10:57 AM.  Learner: Patient  Readiness: Acceptance  Method: Explanation  Response: Verbalizes Understanding  Comment: see above    Mobility Training, taught by Vishal Avendano, PT at  7/16/2025 10:57 AM.  Learner: Patient  Readiness: Acceptance  Method: Explanation  Response: Verbalizes Understanding  Comment: see above    Education Comments  No comments found.                 [1]   Past Medical History:  Diagnosis Date    Allergic 1991    used to carry epi pen for bee sting allergy, doesn't any more    Anxiety 2000    Cataract 2019    Cerebral vascular accident (Multi) 2000    no residual    COPD (chronic obstructive pulmonary disease) (Multi) 2014    Depression 2000    Emphysema of lung (Multi) 2014    GERD (gastroesophageal reflux disease) 2014    uunder control    Hematologic malignancy (Multi) 2009    Hodgkins lymphoma    Herpes zoster 07/16/2021    History of blood transfusion     after childbirth and with Hodgkins ;lymphoma tx    Hodgkin lymphoma, unspecified, unspecified site (Multi) 12/04/2013    s/p chemo & radiation    Hyperlipidemia 2000    Hypothyroidism 2022    Lung cancer (Multi) 2023    s/p surgery with no fuurthher treatment    Migraine headache 2009    Neuromuscular disorder (Multi)     On home oxygen therapy 2014    4 L NC    PAD (peripheral artery disease)     Peripheral neuropathy     chemo induuced - feet

## 2025-07-16 NOTE — CARE PLAN
The patient's goals for the shift include  stephanie rest    The clinical goals for the shift include safety      Problem: Pain - Adult  Goal: Verbalizes/displays adequate comfort level or baseline comfort level  Outcome: Progressing     Problem: Safety - Adult  Goal: Free from fall injury  Outcome: Progressing     Problem: Discharge Planning  Goal: Discharge to home or other facility with appropriate resources  Outcome: Progressing     Problem: Chronic Conditions and Co-morbidities  Goal: Patient's chronic conditions and co-morbidity symptoms are monitored and maintained or improved  Outcome: Progressing     Problem: Nutrition  Goal: Nutrient intake appropriate for maintaining nutritional needs  Outcome: Progressing

## 2025-07-16 NOTE — NURSING NOTE
Discharge instructions provided using teachback method.  Patient's health-related risk factors discussed with patient.  Patient educated to look for worsening signs and symptoms and educated to seek medical attention if experiencing medical emergency.  Patient aware of needs to follow up with outpatient clinics as scheduled. Home going meds reviewed with patient.  Patient verbalized understanding of disposition and discharge instructions.  All questions answered to patient's satisfaction and within nursing scope of practice.  Vitals stable; IV(s) removed.  Per secure chat with Dr. Mccauley, patient ok to use 81mg ASA BID as she can't tolerate 325mg ASA.  Patient aware.

## 2025-07-16 NOTE — SIGNIFICANT EVENT
The patient needs a wheelchair due to mobility limitations that significantly impact her ability to participate in one or more MRADLs in the home due to an ORIF of the right ankle and NWB status to RLE. This limitation prevents the patient from accomplishing MRADLs in a reasonable time frame and places the patient at a reasonably determined heightened risk of injury. The patient's mobility limitation cannot be resolved with the use of a cane or walker. The patient's home environment can safely support the use of the wheelchair and provides adequate access between rooms, maneuvering space, and surfaces for the use of the wheelchair provided. The use of a wheelchair will significantly improve the patient's abilities to participate in MRADLs and the patient will use it on a regular basis in the home. The patient has a caregiver who is available, willing, and able to provide assistance with the wheelchair.

## 2025-07-16 NOTE — HH CARE COORDINATION
Home Care received a Referral for Physical Therapy and Occupational Therapy. We have processed the referral for a Start of Care on 7/17.     If you have any questions or concerns, please feel free to contact us at [phone]. Follow the prompts, enter your five digit zip code, and you will be directed to your care team on EAST 2.

## 2025-07-16 NOTE — NURSING NOTE
Interdisciplinary team present: NP, PT, NM, CC, SW, Orthopedic Coordinator, and bedside RN.  Pain - controlled  Nausea - none  Discharge barrier - needs PT this AM  Discharge plan - Home   Discharge date/time - today pending PT clearance

## 2025-07-16 NOTE — DISCHARGE SUMMARY
Discharge Diagnosis  Trimalleolar fracture of ankle, closed, right, initial encounter    Issues Requiring Follow-Up  Right ankle fracture    Test Results Pending At Discharge  Pending Labs       No current pending labs.            Hospital Course   69 year-old F who presented with a right ankle fracture. Patient is now s/p R ankle ORIF on 7/15/25 by Dr. Gibson. On the day of surgery, patient was identified in the pre-operative holding area and agreeable to proceed with surgery. Written consent was obtained.  Please see operative note for further details of this procedure. Patient received 24 hours of richy-operative antibiotics. Patient recovered in the PACU before transfer to a regular nursing floor. Patient was started on oxycodone, tylenol, and dilaudid for pain control and ASA 81 mg bid for DVT prophylaxis. Physical therapy recommended continued recovery at home with continued physical therapy and wound care. On the day of discharge, patient was afebrile with stable vital signs. Patient was neurovascularly intact at time of discharge. Patient was discharged with prescription of ASA 81 mg bid for DVT prophylaxis for 6 weeks. Patient will follow-up with Dr. Gibson in 2 weeks for post-operative visit.      Visit Vitals  /59 (BP Location: Right arm, Patient Position: Lying)   Pulse 105   Temp 37 °C (98.6 °F) (Temporal)   Resp 18     Vitals:    07/15/25 0846   Weight: 63 kg (138 lb 14.2 oz)       Immunization History   Administered Date(s) Administered    Flu vaccine (IIV4), preservative free *Check age/dose* 11/02/2016, 09/25/2018, 10/04/2019, 10/07/2021    Flu vaccine, quadrivalent, high-dose, preservative free, age 65y+ (FLUZONE) 10/05/2022, 09/26/2023    Flu vaccine, quadrivalent, no egg protein, age 6 month or greater (FLUCELVAX) 10/05/2020    Flu vaccine, trivalent, preservative free, age 6 months and greater (Fluarix/Fluzone/Flulaval) 09/30/2013    Influenza, injectable, quadrivalent  "10/20/2017    Influenza, seasonal, injectable 11/01/2012, 10/02/2015    Moderna COVID-19 vaccine, bivalent, blue cap/gray label *Check age/dose* 11/09/2022    Moderna SARS-CoV-2 Vaccination 03/09/2021, 04/20/2021, 12/06/2021    Novel influenza-H1N1-09, preservative-free 12/22/2009    Pneumococcal conjugate vaccine, 20-valent (PREVNAR 20) 09/26/2023       Results        Pertinent Physical Exam At Time of Discharge  /52   Pulse 69   Temp 36.8 °C (98.2 °F)   Resp 16   Ht 1.575 m (5' 2\")   Wt 63 kg (138 lb 14.2 oz)   SpO2 98%   Breastfeeding No   BMI 25.40 kg/m²      Gen: arousable, NAD, appropriately conversational  Cardiac: RRR to peripheral palpation  Resp: nonlabored on RA  GI: soft, nondistended     MSK:  Right lower Extremity:     -Post-operative splint in place without strikethrough bleeding, clean dry and intact.   -Motor intact in digits  -SILT in digits, decreased due to nerve block  -Digits wwp, brisk cap refill  -Compartments soft and compressible, no pain with passive dorsiflexion    Home Medications     Medication List      START taking these medications     aspirin 325 mg tablet; Take 1 tablet (325 mg) by mouth 2 times a day.   docusate sodium 100 mg capsule; Commonly known as: Colace; Take 1   capsule (100 mg) by mouth 2 times a day. Take while taking narcotic pain   medication to prevent constipation   HYDROcodone-acetaminophen 5-325 mg tablet; Commonly known as: Norco;   Take 1 tablet by mouth every 4 hours if needed for severe pain (7 - 10) (1   tab every 6 hours) for up to 7 days.   ondansetron 4 mg tablet; Commonly known as: Zofran; Take 1 tablet (4 mg)   by mouth every 8 hours if needed for nausea or vomiting.     CHANGE how you take these medications     albuterol 90 mcg/actuation aerosol powdr breath activated inhaler; What   changed: Another medication with the same name was removed. Continue   taking this medication, and follow the directions you see here.     CONTINUE taking " these medications     acetaminophen-codeine 300-30 mg tablet; Commonly known as: Tylenol w/   Codeine #3   atorvastatin 20 mg tablet; Commonly known as: Lipitor   chlorhexidine 0.12 % solution; Commonly known as: Peridex; 15 ml swish   and spit for 30 seconds night prior to surgery and morning of surgery   diphenhydrAMINE-acetaminophen  mg per tablet; Commonly known as:   Tylenol PM   DULoxetine 60 mg DR capsule; Commonly known as: Cymbalta   folic acid 400 mcg tablet; Commonly known as: Folvite   gabapentin 300 mg capsule; Commonly known as: Neurontin   levothyroxine 25 mcg tablet; Commonly known as: Synthroid, Levoxyl   * LORazepam 1 mg tablet; Commonly known as: Ativan   * LORazepam 1 mg tablet; Commonly known as: Ativan   oxygen gas therapy; Commonly known as: O2   promethazine 25 mg tablet; Commonly known as: Phenergan   rOPINIRole 1 mg tablet; Commonly known as: Requip   tiZANidine 4 mg tablet; Commonly known as: Zanaflex   Trelegy Ellipta 200-62.5-25 mcg blister with device; Generic drug:   fluticasone-umeclidin-vilanter  * This list has 2 medication(s) that are the same as other medications   prescribed for you. Read the directions carefully, and ask your doctor or   other care provider to review them with you.     STOP taking these medications     Bevespi Aerosphere 9-4.8 mcg HFA aerosol inhaler; Generic drug:   glycopyrrolate-formoteroL   oxyCODONE 5 mg immediate release tablet; Commonly known as: Roxicodone     ASK your doctor about these medications     oxyCODONE-acetaminophen 5-325 mg tablet; Commonly known as: Percocet;   Take 1 tablet by mouth every 6 hours if needed for severe pain (7 - 10) (1   tab every 6 hours) for up to 7 days.; Ask about: Should I take this   medication?       Outpatient Follow-Up  Future Appointments   Date Time Provider Department Center   7/21/2025  1:40 PM Alin Christian RP [id number] [facility]   9/11/2025  9:00 AM GE[facility] CT 2 [facility]   9/11/2025  9:30 AM [facility]  ULTRASOUND 1 [facility]   9/18/2025  9:00 AM Spencer Rea APRN-CNP [id number] [facility]   10/7/2025 10:45 AM Franky Dee MD [id number] [facility]   10/21/2025  9:20 AM Bry Sykes APRN-CNP [id number] [facility]       Joaquín Mccauley MD

## 2025-07-17 NOTE — ANESTHESIA POSTPROCEDURE EVALUATION
Patient: Fatmata Plummer    Procedure Summary       Date: 07/15/25 Room / Location: [facility] 11 / Virtual [facility]    Anesthesia Start: 1043 Anesthesia Stop: 1233    Procedure: Right Ankle Fracture Open Reduction Internal Fixation (Right: Ankle) Diagnosis:       Trimalleolar fracture of ankle, closed, right, initial encounter      (Trimalleolar fracture of ankle, closed, right, initial encounter [S82.851A])    Surgeons: Jaky Gibson MD Responsible Provider: Brian Cook DO    Anesthesia Type: general ASA Status: 3            Anesthesia Type: general    Vitals Value Taken Time   /56 07/15/25 14:45   Temp 36.5 °C (97.7 °F) 07/15/25 14:45   Pulse 107 07/15/25 14:46   Resp 22 07/15/25 14:46   SpO2 98 % 07/15/25 14:45   Vitals shown include unfiled device data.    Anesthesia Post Evaluation    Patient location during evaluation: PACU  Patient participation: complete - patient participated  Level of consciousness: awake and alert  Pain management: adequate  Airway patency: patent  Cardiovascular status: acceptable  Respiratory status: acceptable  Hydration status: acceptable  Postoperative Nausea and Vomiting: none        No notable events documented.

## 2025-07-18 ENCOUNTER — HOME CARE VISIT (OUTPATIENT)
Dept: HOME HEALTH SERVICES | Facility: HOME HEALTH | Age: 70
End: 2025-07-18
Payer: MEDICARE

## 2025-07-18 VITALS
OXYGEN SATURATION: 95 % | TEMPERATURE: 98.4 F | SYSTOLIC BLOOD PRESSURE: 90 MMHG | DIASTOLIC BLOOD PRESSURE: 50 MMHG | HEART RATE: 100 BPM

## 2025-07-18 PROCEDURE — 169592 NO-PAY CLAIM PROCEDURE

## 2025-07-18 PROCEDURE — G0152 HHCP-SERV OF OT,EA 15 MIN: HCPCS | Mod: HHH

## 2025-07-18 SDOH — ECONOMIC STABILITY: HOUSING INSECURITY: EVIDENCE OF SMOKING MATERIAL: 0

## 2025-07-18 SDOH — HEALTH STABILITY: MENTAL HEALTH: SMOKING IN HOME: 0

## 2025-07-18 ASSESSMENT — ENCOUNTER SYMPTOMS
PAIN LOCATION - PAIN FREQUENCY: CONSTANT
NYSTAGMUS: 0
ANGER WITHIN DEFINED LIMITS: 1
HIGHEST PAIN SEVERITY IN PAST 24 HOURS: 9/10
PERSON REPORTING PAIN: PATIENT
PAIN LOCATION - RELIEVING FACTORS: PAIN MEDS
DOUBLE VISION: 0
PAIN LOCATION - EXACERBATING FACTORS: SURGERY
AGGRESSION WITHIN DEFINED LIMITS: 1
PAIN LOCATION: RIGHT ANKLE
FATIGUE: 1
PAIN LOCATION - PAIN SEVERITY: 5/10
LOWEST PAIN SEVERITY IN PAST 24 HOURS: 4/10
PAIN SEVERITY GOAL: 0/10
PAIN: 1
SUBJECTIVE PAIN PROGRESSION: UNCHANGED

## 2025-07-18 ASSESSMENT — ACTIVITIES OF DAILY LIVING (ADL)
ENTERING_EXITING_HOME: MAXIMUM ASSIST
OASIS_M1830: 05

## 2025-07-19 ENCOUNTER — HOME CARE VISIT (OUTPATIENT)
Dept: HOME HEALTH SERVICES | Facility: HOME HEALTH | Age: 70
End: 2025-07-19
Payer: MEDICARE

## 2025-07-19 VITALS
TEMPERATURE: 98.7 F | OXYGEN SATURATION: 98 % | HEART RATE: 110 BPM | RESPIRATION RATE: 18 BRPM | SYSTOLIC BLOOD PRESSURE: 110 MMHG | DIASTOLIC BLOOD PRESSURE: 80 MMHG

## 2025-07-19 PROCEDURE — G0151 HHCP-SERV OF PT,EA 15 MIN: HCPCS | Mod: HHH

## 2025-07-19 SDOH — HEALTH STABILITY: MENTAL HEALTH: SMOKING IN HOME: 0

## 2025-07-19 SDOH — HEALTH STABILITY: PHYSICAL HEALTH: EXERCISE COMMENTS: PATIENT INSTRUCTED IN AND COMPLETES 10 REPS SEATED AP, LAQ, MARCHING LLE ONLY.

## 2025-07-19 SDOH — ECONOMIC STABILITY: HOUSING INSECURITY: EVIDENCE OF SMOKING MATERIAL: 0

## 2025-07-19 ASSESSMENT — ACTIVITIES OF DAILY LIVING (ADL)
CURRENT_FUNCTION: MINIMUM ASSIST
PHYSICAL TRANSFERS ASSESSED: 1

## 2025-07-19 ASSESSMENT — ENCOUNTER SYMPTOMS
LOWEST PAIN SEVERITY IN PAST 24 HOURS: 1/10
PAIN LOCATION: RIGHT ANKLE
PERSON REPORTING PAIN: PATIENT
HIGHEST PAIN SEVERITY IN PAST 24 HOURS: 9/10
SUBJECTIVE PAIN PROGRESSION: UNCHANGED
PAIN LOCATION - PAIN SEVERITY: 1/10
PAIN LOCATION - EXACERBATING FACTORS: MOVEMENT
PAIN LOCATION - RELIEVING FACTORS: MEDS, ICE
PAIN LOCATION - PAIN QUALITY: ACHE
PAIN LOCATION - PAIN FREQUENCY: CONSTANT
PAIN: 1
OCCASIONAL FEELINGS OF UNSTEADINESS: 1

## 2025-07-20 NOTE — CASE COMMUNICATION
Patient  Subjective: CO LEFT ANKLE PAIN 1/10  Primary Reason for Home Care: S/P FALL AND R ANKLE FX, NWB RLE  Skilled Needs: PT FOR STRENGTHENING, GT, TRANSFER TRAINING   Precautions: FALLS, NWB RLE  Instruction provided: PATIENT INSTRUCTED IN SAFE TRANSFER TECHNIQUE, STRENGTH TRAINING, HEP, BALANCE TRAINING, FALLS PREVENTION TRAINING, PAIN MANAGEMENT, HOME SAFETY EVAL.  Patient and Caregiver response to instruction: VERBALIZE UNDERSTAN DING   Patient and Caregiver instructed on plan of care and visit frequency.   Patient and Caregiver in agreement with plan of care and visit frequency.    Discipline Communication: RECOMMENDED PT 1W2, THEN 2W2  Plan for next visit: TRANSFER TRAINING, GT, STRENGTHENING    Medication Reconciliation:    Demonstrates Adherence : Yes  Issues/Concerns: No  Provider Notified of Issues/Concerns: N/A  Caregiver Notified of Issues/Concerns: N/A   patient  response to instructions Verbalized Understanding  Pill bottles visualized during treatment Yes

## 2025-07-21 ENCOUNTER — APPOINTMENT (OUTPATIENT)
Dept: PHARMACY | Facility: HOSPITAL | Age: 70
End: 2025-07-21
Payer: MEDICARE

## 2025-07-21 DIAGNOSIS — J44.9 CHRONIC OBSTRUCTIVE PULMONARY DISEASE, UNSPECIFIED COPD TYPE (MULTI): ICD-10-CM

## 2025-07-21 RX ORDER — GLYCOPYRROLATE AND FORMOTEROL FUMARATE 9; 4.8 UG/1; UG/1
2 AEROSOL, METERED RESPIRATORY (INHALATION) 2 TIMES DAILY
Qty: 10.7 G | Refills: 11 | Status: SHIPPED | OUTPATIENT
Start: 2025-07-21

## 2025-07-21 NOTE — PROGRESS NOTES
"  Pharmacist Clinic: Pulmonary Management    Fatmata Plummer is a 69 y.o. female was referred to Clinical Pharmacy Team for Pulmonary assessment.   Referring Provider: Bry Sykes APRN-*  Last visit: 6/18/25  Next visit: 10/21/25    Subjective   Allergies[1]    HPI    PULMONARY ASSESSMENT  Patient has been diagnosed with: COPD    Current Regimen:  Albuterol HFA  Oxygen therapy    Symptom Management:  Current symptoms: dyspnea, cough, and wheezing    Exacerbation Hx:  When was your last hospitalization for an exacerbation? 2023  When was the last time you were treated with antibiotics and/or steroids? June 2024     Rescue Inhaler Use:  How often do you use your rescue inhaler? < 2x weekly    Appropriate Inhaler Technique: yes    Smoking history  - She quit smoking approximately 11 years ago.      Objective   There were no vitals taken for this visit.    Secondary Prevention (vaccines):  -Influenza: Date [2024]  -PCV20: Date [2023]  -COVID: Date [Recommended]  -RSV: Date [Recommended]  -TDAP: Date [Recommended]  -Shingrix: Date [Recommended]    Pulmonary Functions Testing Results:  No results found for: \"FEV1\", \"FVC\", \"HYG5CNX\", \"TLC\", \"DLCO\"    Lab Review  Lab Results   Component Value Date    BILITOT 0.2 05/15/2025    CALCIUM 8.4 (L) 07/16/2025    CO2 24 07/16/2025     07/16/2025    CREATININE 1.01 07/16/2025    GLUCOSE 102 (H) 07/16/2025    ALKPHOS 78 05/15/2025    K 4.2 07/16/2025    PROT 7.5 05/15/2025     07/16/2025    AST 12 05/15/2025    ALT 12 05/15/2025    BUN 17 07/16/2025    ANIONGAP 15 07/16/2025    MG 2.55 (H) 06/06/2024    PHOS 2.3 (L) 09/16/2023    ALBUMIN 4.1 05/15/2025    GFRF 77 09/17/2023     Hemoglobin   Date Value Ref Range Status   07/16/2025 8.3 (L) 12.0 - 16.0 g/dL Final     WBC   Date Value Ref Range Status   07/16/2025 10.0 4.4 - 11.3 x10*3/uL Final     Platelets   Date Value Ref Range Status   07/16/2025 281 150 - 450 x10*3/uL Final       The ASCVD Risk score (Jordan HOWARD, et " al., 2019) failed to calculate for the following reasons:    Risk score cannot be calculated because patient has a medical history suggesting prior/existing ASCVD    Current Outpatient Medications   Medication Instructions    acetaminophen-codeine (Tylenol w/ Codeine #3) 300-30 mg tablet 1 tablet, Every 6 hours PRN    albuterol 90 mcg/actuation aerosol powdr breath activated inhaler 2 puffs, Every 4 hours PRN    aspirin 81 mg, oral, Daily    atorvastatin (Lipitor) 20 mg tablet 1 tablet, Daily    cyanocobalamin (VITAMIN B-12) 500 mcg, oral, Daily    diphenhydrAMINE-acetaminophen (Tylenol PM)  mg per tablet 2 tablets, Nightly PRN    docusate sodium (COLACE) 100 mg, oral, 2 times daily, Take while taking narcotic pain medication to prevent constipation    DULoxetine (Cymbalta) 60 mg DR capsule 1 capsule, Every 24 hours    folic acid (Folvite) 400 mcg tablet 2.5 tablets, Every morning    gabapentin (Neurontin) 300 mg capsule 3 capsules, 2 times daily    glycopyrrolate-formoteroL (Bevespi Aerosphere) 9-4.8 mcg HFA aerosol inhaler 2 Inhalations, inhalation, 2 times daily    HYDROcodone-acetaminophen (Norco) 5-325 mg tablet 1 tablet, oral, Every 4 hours PRN    levothyroxine (Synthroid, Levoxyl) 25 mcg tablet 1 tablet, Daily before breakfast    LORazepam (Ativan) 1 mg tablet 1 tablet, Every morning    LORazepam (ATIVAN) 1 mg, Nightly    ondansetron (ZOFRAN) 4 mg, oral, Every 8 hours PRN    oxygen (O2) 4 L/min, Continuous    promethazine (Phenergan) 25 mg tablet 1 tablet, 2 times daily    rOPINIRole (Requip) 1 mg tablet 1 tablet, Nightly    tiZANidine (Zanaflex) 4 mg tablet 2 tablets, Nightly    topiramate (TOPAMAX) 100 mg, oral, Daily       Drug Interactions:  None requiring intervention    Affordability/Accessibility:  Struggles with copay due to fixed income    Assessment/Plan   Problem List Items Addressed This Visit       COPD (chronic obstructive pulmonary disease) (Multi)    Relevant Medications     "glycopyrrolate-formoteroL (Bevespi Aerosphere) 9-4.8 mcg HFA aerosol inhaler    Other Relevant Orders    Referral to Clinical Pharmacy   Continue current plan of increasing access to Bevespi  Counseled on admin and side effect mgmt  Overall controlled on albuterol  Follow up 8/21/25 @ 0920     Patient Assistance Screening (VAF)    Patient verbally reports monthly or yearly income which is less than 400% federal poverty level     Application for program has been submitted for the following medications: Bevespi    Patient has been informed that program team will be reaching out to them to discuss necessary documentation, instructed to answer phone/return voicemail.     Patient aware this process may take up to 6 weeks.     If approved medication must be filled through [facility] pharmacy and may be picked up or mailed to patient.       Alin Christian RPh    Continue all meds under the continuation of care with the referring provider and clinical pharmacy team.    Verbal consent to manage patient's drug therapy was obtained from the patient. They were informed they may decline to participate or withdraw from participation in pharmacy services at any time.         [1]   Allergies  Allergen Reactions    Latex Other     BLISTER-from adhesive dressing post op    Bupropion Other     \"Hyper\"    Cilostazol Other     \"Hyper\"    Nsaids (Non-Steroidal Anti-Inflammatory Drug) GI Upset    Pseudoephedrine Other     \"Hyper\"     "

## 2025-07-22 ENCOUNTER — HOME CARE VISIT (OUTPATIENT)
Dept: HOME HEALTH SERVICES | Facility: HOME HEALTH | Age: 70
End: 2025-07-22
Payer: MEDICARE

## 2025-07-22 VITALS
DIASTOLIC BLOOD PRESSURE: 64 MMHG | TEMPERATURE: 97.8 F | HEART RATE: 100 BPM | OXYGEN SATURATION: 97 % | RESPIRATION RATE: 18 BRPM | SYSTOLIC BLOOD PRESSURE: 138 MMHG

## 2025-07-22 PROCEDURE — G0151 HHCP-SERV OF PT,EA 15 MIN: HCPCS | Mod: HHH

## 2025-07-22 SDOH — HEALTH STABILITY: PHYSICAL HEALTH: EXERCISE ACTIVITY: HEEL SLIDES

## 2025-07-22 SDOH — HEALTH STABILITY: MENTAL HEALTH: SMOKING IN HOME: 0

## 2025-07-22 SDOH — HEALTH STABILITY: PHYSICAL HEALTH: PHYSICAL EXERCISE: 10

## 2025-07-22 SDOH — HEALTH STABILITY: PHYSICAL HEALTH: EXERCISE ACTIVITY: KNEE EXT.

## 2025-07-22 SDOH — HEALTH STABILITY: PHYSICAL HEALTH: EXERCISE ACTIVITIES SETS: 1

## 2025-07-22 SDOH — HEALTH STABILITY: PHYSICAL HEALTH: PHYSICAL EXERCISE: SUPINE

## 2025-07-22 SDOH — HEALTH STABILITY: PHYSICAL HEALTH

## 2025-07-22 SDOH — HEALTH STABILITY: PHYSICAL HEALTH: EXERCISE ACTIVITY: HIP FLEX.

## 2025-07-22 SDOH — ECONOMIC STABILITY: HOUSING INSECURITY: EVIDENCE OF SMOKING MATERIAL: 0

## 2025-07-22 SDOH — HEALTH STABILITY: PHYSICAL HEALTH: PHYSICAL EXERCISE: SITTING

## 2025-07-22 SDOH — HEALTH STABILITY: PHYSICAL HEALTH
EXERCISE COMMENTS: PT. REQUIRED INSTR. FOR PROPER PACE AND FULL AVAILABLE ROM FOR EACH EXERCISE WITH INSTR. FOR PROPER PACE AND FULL AVAILABLE ROM FOR EACH EXERCISE.

## 2025-07-22 ASSESSMENT — ACTIVITIES OF DAILY LIVING (ADL): CURRENT_FUNCTION: ONE PERSON

## 2025-07-22 ASSESSMENT — ENCOUNTER SYMPTOMS
SUBJECTIVE PAIN PROGRESSION: WAXING AND WANING
MUSCLE WEAKNESS: 1
PAIN SEVERITY GOAL: 3/10
OCCASIONAL FEELINGS OF UNSTEADINESS: 1
LOWEST PAIN SEVERITY IN PAST 24 HOURS: 4/10
PAIN: 1
PERSON REPORTING PAIN: PATIENT
HIGHEST PAIN SEVERITY IN PAST 24 HOURS: 8/10
PAIN LOCATION: RIGHT ANKLE

## 2025-07-23 ENCOUNTER — HOME CARE VISIT (OUTPATIENT)
Dept: HOME HEALTH SERVICES | Facility: HOME HEALTH | Age: 70
End: 2025-07-23
Payer: MEDICARE

## 2025-07-23 PROCEDURE — G0152 HHCP-SERV OF OT,EA 15 MIN: HCPCS | Mod: HHH

## 2025-07-23 ASSESSMENT — ENCOUNTER SYMPTOMS
PAIN LOCATION: RIGHT LEG
PAIN LOCATION - PAIN SEVERITY: 4/10
PAIN LOCATION - RELIEVING FACTORS: REST, MEDS, ELEVATION
PAIN: 1
PERSON REPORTING PAIN: PATIENT
PAIN LOCATION - EXACERBATING FACTORS: STANDING
PAIN LOCATION - PAIN QUALITY: ACHE
PAIN LOCATION - PAIN FREQUENCY: INTERMITTENT

## 2025-07-25 ENCOUNTER — OFFICE VISIT (OUTPATIENT)
Dept: ORTHOPEDIC SURGERY | Facility: CLINIC | Age: 70
End: 2025-07-25
Payer: MEDICARE

## 2025-07-25 ENCOUNTER — HOSPITAL ENCOUNTER (OUTPATIENT)
Dept: RADIOLOGY | Facility: CLINIC | Age: 70
Discharge: HOME | End: 2025-07-25
Payer: MEDICARE

## 2025-07-25 DIAGNOSIS — M25.571 ACUTE RIGHT ANKLE PAIN: ICD-10-CM

## 2025-07-25 DIAGNOSIS — S82.851A TRIMALLEOLAR FRACTURE OF ANKLE, CLOSED, RIGHT, INITIAL ENCOUNTER: Primary | ICD-10-CM

## 2025-07-25 PROCEDURE — 1159F MED LIST DOCD IN RCRD: CPT | Performed by: ORTHOPAEDIC SURGERY

## 2025-07-25 PROCEDURE — 73610 X-RAY EXAM OF ANKLE: CPT | Mod: RT

## 2025-07-25 PROCEDURE — 99024 POSTOP FOLLOW-UP VISIT: CPT | Performed by: ORTHOPAEDIC SURGERY

## 2025-07-25 PROCEDURE — 1125F AMNT PAIN NOTED PAIN PRSNT: CPT | Performed by: ORTHOPAEDIC SURGERY

## 2025-07-25 PROCEDURE — 99214 OFFICE O/P EST MOD 30 MIN: CPT | Performed by: ORTHOPAEDIC SURGERY

## 2025-07-25 RX ORDER — HYDROCODONE BITARTRATE AND ACETAMINOPHEN 5; 325 MG/1; MG/1
1 TABLET ORAL EVERY 4 HOURS PRN
Qty: 42 TABLET | Refills: 0 | Status: SHIPPED | OUTPATIENT
Start: 2025-07-25 | End: 2025-07-25

## 2025-07-25 RX ORDER — HYDROCODONE BITARTRATE AND ACETAMINOPHEN 5; 325 MG/1; MG/1
1 TABLET ORAL EVERY 4 HOURS PRN
Qty: 42 TABLET | Refills: 0 | Status: SHIPPED | OUTPATIENT
Start: 2025-07-25 | End: 2025-07-26 | Stop reason: SDUPTHER

## 2025-07-25 ASSESSMENT — PAIN SCALES - GENERAL: PAINLEVEL_OUTOF10: 4

## 2025-07-25 ASSESSMENT — PAIN - FUNCTIONAL ASSESSMENT: PAIN_FUNCTIONAL_ASSESSMENT: 0-10

## 2025-07-25 ASSESSMENT — PAIN DESCRIPTION - DESCRIPTORS: DESCRIPTORS: ACHING

## 2025-07-25 NOTE — PROGRESS NOTES
Fatmata Plummer     Surgery Date: 7/15/2025  Surgery: R ankle ORIF    Interval History:  They are now 1 week(s) since surgery. They report managing pain. Has been NWB. No other concerns.     ASSESSMENT/PLAN:  Short leg cast today  Norco refilled  Provided PT order, as they were advised physical therapy is booking significantly out.      Follow up in 2 weeks, with right ankle films out of cast upon return.      Physical Exam:  Well appearing female in no acute distress; Alert and oriented.  Right  Leg:  Incision is clean dry and intact.  There is no erythema warmth or drainage.  They have palpable pulses, sensation is intact.  They are able to flex and extend their toes and ankles without difficulty      Radiographs:  Imaging was ordered today. Final results and radiologist's interpretation, available in the [facility] health record. Images were reviewed with the patient/family members in the office today. My personal interpretation of the performed imaging is unchanged ankle ORIF    Medication, History, and Allergies were reviewed and updated in the medical record.    Jaky Gibson MD

## 2025-07-26 ENCOUNTER — TELEPHONE (OUTPATIENT)
Dept: ORTHOPEDIC SURGERY | Facility: CLINIC | Age: 70
End: 2025-07-26
Payer: MEDICARE

## 2025-07-26 DIAGNOSIS — S82.851A TRIMALLEOLAR FRACTURE OF ANKLE, CLOSED, RIGHT, INITIAL ENCOUNTER: Primary | ICD-10-CM

## 2025-07-26 RX ORDER — HYDROCODONE BITARTRATE AND ACETAMINOPHEN 5; 325 MG/1; MG/1
1 TABLET ORAL EVERY 4 HOURS PRN
Qty: 42 TABLET | Refills: 0 | Status: SHIPPED | OUTPATIENT
Start: 2025-07-26 | End: 2025-08-02

## 2025-07-29 ENCOUNTER — HOME CARE VISIT (OUTPATIENT)
Dept: HOME HEALTH SERVICES | Facility: HOME HEALTH | Age: 70
End: 2025-07-29
Payer: MEDICARE

## 2025-07-29 VITALS
SYSTOLIC BLOOD PRESSURE: 136 MMHG | DIASTOLIC BLOOD PRESSURE: 64 MMHG | HEART RATE: 96 BPM | TEMPERATURE: 97.4 F | RESPIRATION RATE: 18 BRPM | OXYGEN SATURATION: 96 %

## 2025-07-29 PROCEDURE — G0151 HHCP-SERV OF PT,EA 15 MIN: HCPCS | Mod: HHH

## 2025-07-29 SDOH — HEALTH STABILITY: PHYSICAL HEALTH: PHYSICAL EXERCISE: SITTING

## 2025-07-29 SDOH — ECONOMIC STABILITY: HOUSING INSECURITY: EVIDENCE OF SMOKING MATERIAL: 0

## 2025-07-29 SDOH — HEALTH STABILITY: PHYSICAL HEALTH: PHYSICAL EXERCISE: 12

## 2025-07-29 SDOH — HEALTH STABILITY: PHYSICAL HEALTH: EXERCISE ACTIVITIES SETS: 1

## 2025-07-29 SDOH — HEALTH STABILITY: MENTAL HEALTH: SMOKING IN HOME: 0

## 2025-07-29 SDOH — HEALTH STABILITY: PHYSICAL HEALTH: EXERCISE ACTIVITY: KNEE EXT.

## 2025-07-29 SDOH — HEALTH STABILITY: PHYSICAL HEALTH: EXERCISE COMMENTS: PT. REQUIRED INSTR. FOR PROPER PACE AND FULL AVAILABLE ROM FOR EACH EXERCISE.

## 2025-07-29 SDOH — HEALTH STABILITY: PHYSICAL HEALTH

## 2025-07-29 SDOH — HEALTH STABILITY: PHYSICAL HEALTH: EXERCISE ACTIVITY: HIP FLEX.

## 2025-07-29 ASSESSMENT — ENCOUNTER SYMPTOMS
PAIN SEVERITY GOAL: 3/10
MUSCLE WEAKNESS: 1
LOWEST PAIN SEVERITY IN PAST 24 HOURS: 2/10
SUBJECTIVE PAIN PROGRESSION: GRADUALLY IMPROVING
PERSON REPORTING PAIN: PATIENT
PAIN: 1
PAIN LOCATION: RIGHT ANKLE
HIGHEST PAIN SEVERITY IN PAST 24 HOURS: 4/10
OCCASIONAL FEELINGS OF UNSTEADINESS: 1

## 2025-07-29 ASSESSMENT — ACTIVITIES OF DAILY LIVING (ADL): AMBULATION ASSISTANCE ON FLAT SURFACES: 1

## 2025-07-30 ENCOUNTER — HOME CARE VISIT (OUTPATIENT)
Dept: HOME HEALTH SERVICES | Facility: HOME HEALTH | Age: 70
End: 2025-07-30
Payer: MEDICARE

## 2025-07-30 PROCEDURE — G0152 HHCP-SERV OF OT,EA 15 MIN: HCPCS | Mod: HHH

## 2025-07-30 ASSESSMENT — ENCOUNTER SYMPTOMS
PAIN LOCATION: ABDOMEN
PAIN LOCATION - PAIN SEVERITY: 5/10
LOWEST PAIN SEVERITY IN PAST 24 HOURS: 0/10
PAIN: 1
SUBJECTIVE PAIN PROGRESSION: GRADUALLY IMPROVING
PAIN SEVERITY GOAL: 0/10
HIGHEST PAIN SEVERITY IN PAST 24 HOURS: 7/10
PAIN LOCATION - EXACERBATING FACTORS: MOVEMENT
PAIN LOCATION - PAIN FREQUENCY: INTERMITTENT
PAIN LOCATION - PAIN QUALITY: ACHING
PERSON REPORTING PAIN: PATIENT

## 2025-07-31 ENCOUNTER — HOME CARE VISIT (OUTPATIENT)
Dept: HOME HEALTH SERVICES | Facility: HOME HEALTH | Age: 70
End: 2025-07-31
Payer: MEDICARE

## 2025-07-31 VITALS
HEART RATE: 96 BPM | RESPIRATION RATE: 18 BRPM | OXYGEN SATURATION: 97 % | SYSTOLIC BLOOD PRESSURE: 142 MMHG | DIASTOLIC BLOOD PRESSURE: 68 MMHG | TEMPERATURE: 97.4 F

## 2025-07-31 PROCEDURE — G0151 HHCP-SERV OF PT,EA 15 MIN: HCPCS | Mod: HHH

## 2025-07-31 SDOH — HEALTH STABILITY: MENTAL HEALTH: SMOKING IN HOME: 0

## 2025-07-31 SDOH — HEALTH STABILITY: PHYSICAL HEALTH
EXERCISE COMMENTS: B LE EXER.S IN SUPINE AND SITTING POS., 10 TO 15 REPS X 1 SET EA EXER. WITH INSTR. FOR PROPER PACE AND FULL AVAILABLE ROM FOR EACH EXER.

## 2025-07-31 SDOH — ECONOMIC STABILITY: HOUSING INSECURITY: EVIDENCE OF SMOKING MATERIAL: 0

## 2025-07-31 ASSESSMENT — ENCOUNTER SYMPTOMS
PAIN SEVERITY GOAL: 3/10
HIGHEST PAIN SEVERITY IN PAST 24 HOURS: 4/10
PAIN LOCATION: RIGHT ANKLE
MUSCLE WEAKNESS: 1
OCCASIONAL FEELINGS OF UNSTEADINESS: 1
PAIN: 1
SUBJECTIVE PAIN PROGRESSION: GRADUALLY IMPROVING
PERSON REPORTING PAIN: PATIENT
LOWEST PAIN SEVERITY IN PAST 24 HOURS: 2/10

## 2025-07-31 ASSESSMENT — ACTIVITIES OF DAILY LIVING (ADL)
PHYSICAL TRANSFERS ASSESSED: 1
AMBULATION ASSISTANCE: 1
AMBULATION ASSISTANCE: ONE PERSON
AMBULATION ASSISTANCE ON FLAT SURFACES: 1
CURRENT_FUNCTION: ONE PERSON
AMBULATION ASSISTANCE: STAND BY ASSIST
PHYSICAL_TRANSFERS_DEVICES: WHEELED WALKER
CURRENT_FUNCTION: STAND BY ASSIST

## 2025-08-04 ENCOUNTER — HOME CARE VISIT (OUTPATIENT)
Dept: HOME HEALTH SERVICES | Facility: HOME HEALTH | Age: 70
End: 2025-08-04
Payer: MEDICARE

## 2025-08-04 PROCEDURE — G0157 HHC PT ASSISTANT EA 15: HCPCS | Mod: CQ,HHH

## 2025-08-04 ASSESSMENT — ENCOUNTER SYMPTOMS
PAIN LOCATION - PAIN FREQUENCY: CONSTANT
PAIN: 1
HIGHEST PAIN SEVERITY IN PAST 24 HOURS: 8/10
PERSON REPORTING PAIN: PATIENT
PAIN LOCATION: RIGHT ANKLE
PAIN LOCATION - PAIN SEVERITY: 8/10
LOWEST PAIN SEVERITY IN PAST 24 HOURS: 3/10

## 2025-08-05 ENCOUNTER — TELEPHONE (OUTPATIENT)
Dept: ORTHOPEDIC SURGERY | Facility: CLINIC | Age: 70
End: 2025-08-05
Payer: MEDICARE

## 2025-08-05 DIAGNOSIS — S82.851A TRIMALLEOLAR FRACTURE OF ANKLE, CLOSED, RIGHT, INITIAL ENCOUNTER: Primary | ICD-10-CM

## 2025-08-05 RX ORDER — HYDROCODONE BITARTRATE AND ACETAMINOPHEN 5; 325 MG/1; MG/1
1 TABLET ORAL EVERY 6 HOURS PRN
Qty: 28 TABLET | Refills: 0 | Status: SHIPPED | OUTPATIENT
Start: 2025-08-05 | End: 2025-08-12

## 2025-08-05 NOTE — TELEPHONE ENCOUNTER
Patient called for refill of Norco - taking 1 pill every 6 hours.     Will refill, follow up as scheduled    Jaky Gibson MD

## 2025-08-08 ENCOUNTER — OFFICE VISIT (OUTPATIENT)
Dept: ORTHOPEDIC SURGERY | Facility: CLINIC | Age: 70
End: 2025-08-08
Payer: MEDICARE

## 2025-08-08 ENCOUNTER — HOSPITAL ENCOUNTER (OUTPATIENT)
Dept: RADIOLOGY | Facility: CLINIC | Age: 70
Discharge: HOME | End: 2025-08-08
Payer: MEDICARE

## 2025-08-08 DIAGNOSIS — S82.851A TRIMALLEOLAR FRACTURE OF ANKLE, CLOSED, RIGHT, INITIAL ENCOUNTER: Primary | ICD-10-CM

## 2025-08-08 DIAGNOSIS — S82.851A TRIMALLEOLAR FRACTURE OF ANKLE, CLOSED, RIGHT, INITIAL ENCOUNTER: ICD-10-CM

## 2025-08-08 PROCEDURE — 1159F MED LIST DOCD IN RCRD: CPT | Performed by: ORTHOPAEDIC SURGERY

## 2025-08-08 PROCEDURE — 99213 OFFICE O/P EST LOW 20 MIN: CPT | Performed by: ORTHOPAEDIC SURGERY

## 2025-08-08 PROCEDURE — 73610 X-RAY EXAM OF ANKLE: CPT | Mod: RT

## 2025-08-08 PROCEDURE — 73610 X-RAY EXAM OF ANKLE: CPT | Mod: RIGHT SIDE | Performed by: RADIOLOGY

## 2025-08-08 PROCEDURE — 1125F AMNT PAIN NOTED PAIN PRSNT: CPT | Performed by: ORTHOPAEDIC SURGERY

## 2025-08-08 PROCEDURE — 99024 POSTOP FOLLOW-UP VISIT: CPT | Performed by: ORTHOPAEDIC SURGERY

## 2025-08-08 ASSESSMENT — PAIN - FUNCTIONAL ASSESSMENT: PAIN_FUNCTIONAL_ASSESSMENT: 0-10

## 2025-08-08 ASSESSMENT — PAIN SCALES - GENERAL: PAINLEVEL_OUTOF10: 3

## 2025-08-08 NOTE — PROGRESS NOTES
Fatmata Plummer     Surgery Date: 7/15/2025  Surgery: R ankle ORIF    Interval History:  They are now 3 week(s) since surgery. They report mild pain, she is using Norco every 6-8 hours.  Has been nonweightbearing.  No other concerns today.    ASSESSMENT/PLAN:  Sutures removed  Short leg cast placed today  Continue nonweightbearing      Follow up in 3 weeks, with weightbearing right ankle films upon return.      Physical Exam:  Well appearing female in no acute distress; Alert and oriented.  Right  Leg:  Incision is clean dry and intact.  There is no erythema warmth or drainage.  They have palpable pulses, sensation is intact.  They are able to flex and extend their toes and ankles without difficulty      Radiographs:  Imaging was ordered today. Final results and radiologist's interpretation, available in the [facility] health record. Images were reviewed with the patient/family members in the office today. My personal interpretation of the performed imaging is healing fracture    Medication, History, and Allergies were reviewed and updated in the medical record.    Jaky Gibson MD

## 2025-08-09 ENCOUNTER — HOME CARE VISIT (OUTPATIENT)
Dept: HOME HEALTH SERVICES | Facility: HOME HEALTH | Age: 70
End: 2025-08-09
Payer: MEDICARE

## 2025-08-09 ASSESSMENT — ACTIVITIES OF DAILY LIVING (ADL)
PHYSICAL TRANSFERS ASSESSED: 1
CURRENT_FUNCTION: STAND BY ASSIST
OASIS_M1830: 05
HOME_HEALTH_OASIS: 01

## 2025-08-10 NOTE — CASE COMMUNICATION
Discipline: Physical Therapy  Reason for discharge: PER CLIENT REQUEST  Summary of care provided: PATIENT INSTRUCTED IN SAFE TRANSFER TECHNIQUE, TRANSFER TRAINING WITH FWW, STRENGTH TRAINING, HEP, BALANCE TRAINING, FALLS PREVENTION TRAINING, PAIN MANAGEMENT, HOME SAFETY EVAL.  Discharge instructions given: CONTINUE WITH HEP TWICE DAILY MAINTAIN NWB RLE  Patient in agreement with discharge plan and instructions.    Services remaining: NO NE  NOMNC obtained NO PATIENT REQUESTED EARLY DC.

## 2025-08-12 PROCEDURE — RXMED WILLOW AMBULATORY MEDICATION CHARGE

## 2025-08-14 DIAGNOSIS — S82.851A TRIMALLEOLAR FRACTURE OF ANKLE, CLOSED, RIGHT, INITIAL ENCOUNTER: Primary | ICD-10-CM

## 2025-08-14 RX ORDER — HYDROCODONE BITARTRATE AND ACETAMINOPHEN 5; 325 MG/1; MG/1
1 TABLET ORAL EVERY 8 HOURS PRN
Qty: 21 TABLET | Refills: 0 | Status: SHIPPED | OUTPATIENT
Start: 2025-08-14 | End: 2025-08-21

## 2025-08-15 ENCOUNTER — PHARMACY VISIT (OUTPATIENT)
Dept: PHARMACY | Facility: CLINIC | Age: 70
End: 2025-08-15
Payer: COMMERCIAL

## 2025-08-21 ENCOUNTER — APPOINTMENT (OUTPATIENT)
Dept: PHARMACY | Facility: HOSPITAL | Age: 70
End: 2025-08-21
Payer: MEDICARE

## 2025-08-21 DIAGNOSIS — J44.9 CHRONIC OBSTRUCTIVE PULMONARY DISEASE, UNSPECIFIED COPD TYPE (MULTI): ICD-10-CM

## 2025-08-27 ENCOUNTER — OFFICE VISIT (OUTPATIENT)
Dept: ORTHOPEDIC SURGERY | Facility: CLINIC | Age: 70
End: 2025-08-27
Payer: MEDICARE

## 2025-08-27 ENCOUNTER — HOSPITAL ENCOUNTER (OUTPATIENT)
Dept: RADIOLOGY | Facility: CLINIC | Age: 70
Discharge: HOME | End: 2025-08-27
Payer: MEDICARE

## 2025-08-27 DIAGNOSIS — S82.851A TRIMALLEOLAR FRACTURE OF ANKLE, CLOSED, RIGHT, INITIAL ENCOUNTER: ICD-10-CM

## 2025-08-27 DIAGNOSIS — S82.851A TRIMALLEOLAR FRACTURE OF ANKLE, CLOSED, RIGHT, INITIAL ENCOUNTER: Primary | ICD-10-CM

## 2025-08-27 PROCEDURE — 99024 POSTOP FOLLOW-UP VISIT: CPT | Performed by: ORTHOPAEDIC SURGERY

## 2025-08-27 PROCEDURE — 99214 OFFICE O/P EST MOD 30 MIN: CPT

## 2025-08-27 PROCEDURE — 73610 X-RAY EXAM OF ANKLE: CPT | Mod: RT

## 2025-08-27 PROCEDURE — 73610 X-RAY EXAM OF ANKLE: CPT | Mod: RIGHT SIDE | Performed by: STUDENT IN AN ORGANIZED HEALTH CARE EDUCATION/TRAINING PROGRAM

## 2025-09-04 ENCOUNTER — EVALUATION (OUTPATIENT)
Dept: PHYSICAL THERAPY | Facility: CLINIC | Age: 70
End: 2025-09-04
Payer: MEDICARE

## 2025-09-04 DIAGNOSIS — M25.571 RIGHT ANKLE PAIN: Primary | ICD-10-CM

## 2025-09-04 PROCEDURE — 97110 THERAPEUTIC EXERCISES: CPT | Mod: GP

## 2025-09-04 PROCEDURE — 97161 PT EVAL LOW COMPLEX 20 MIN: CPT | Mod: GP

## 2025-09-05 DIAGNOSIS — C34.91 ADENOCARCINOMA OF RIGHT LUNG (MULTI): ICD-10-CM

## 2025-09-05 LAB
ALBUMIN SERPL BCP-MCNC: 3.7 G/DL (ref 3.4–5)
ALP SERPL-CCNC: 87 U/L (ref 33–136)
ALT SERPL W P-5'-P-CCNC: 9 U/L (ref 7–45)
ANION GAP SERPL CALC-SCNC: 10 MMOL/L (ref 10–20)
AST SERPL W P-5'-P-CCNC: 12 U/L (ref 9–39)
BILIRUB SERPL-MCNC: 0.2 MG/DL (ref 0–1.2)
BUN SERPL-MCNC: 11 MG/DL (ref 6–23)
CALCIUM SERPL-MCNC: 8.6 MG/DL (ref 8.6–10.3)
CHLORIDE SERPL-SCNC: 103 MMOL/L (ref 98–107)
CO2 SERPL-SCNC: 32 MMOL/L (ref 21–32)
CREAT SERPL-MCNC: 0.89 MG/DL (ref 0.5–1.05)
EGFRCR SERPLBLD CKD-EPI 2021: 71 ML/MIN/1.73M*2
GLUCOSE SERPL-MCNC: 89 MG/DL (ref 74–99)
POTASSIUM SERPL-SCNC: 4.4 MMOL/L (ref 3.5–5.3)
PROT SERPL-MCNC: 6.8 G/DL (ref 6.4–8.2)
SODIUM SERPL-SCNC: 141 MMOL/L (ref 136–145)

## 2025-09-05 PROCEDURE — 36415 COLL VENOUS BLD VENIPUNCTURE: CPT | Performed by: NURSE PRACTITIONER

## 2025-09-11 ENCOUNTER — APPOINTMENT (OUTPATIENT)
Dept: HEMATOLOGY/ONCOLOGY | Facility: CLINIC | Age: 70
End: 2025-09-11
Payer: MEDICARE

## 2025-09-18 ENCOUNTER — APPOINTMENT (OUTPATIENT)
Dept: HEMATOLOGY/ONCOLOGY | Facility: CLINIC | Age: 70
End: 2025-09-18
Payer: MEDICARE

## 2025-10-07 ENCOUNTER — APPOINTMENT (OUTPATIENT)
Dept: PULMONOLOGY | Facility: CLINIC | Age: 70
End: 2025-10-07
Payer: MEDICARE

## 2025-10-07 ENCOUNTER — APPOINTMENT (OUTPATIENT)
Dept: CARDIOTHORACIC SURGERY | Facility: HOSPITAL | Age: 70
End: 2025-10-07
Payer: MEDICARE

## (undated) DEVICE — CUFF, TOURNIQUET, 30 X 4, DUAL PORT/SNGL BLADDER, DISP, LF

## (undated) DEVICE — GLOVE, SURGICAL, PROTEXIS PI MICRO, 6.5, PF, LF

## (undated) DEVICE — APPLICATOR, CHLORAPREP, W/ORANGE TINT, 26ML

## (undated) DEVICE — STOCKINETTE, IMPERVIOUS, 12 X 48 IN, LF, STERILE

## (undated) DEVICE — TOWEL, SURGICAL, NEURO, O/R, 16 X 26, BLUE, STERILE

## (undated) DEVICE — SUTURE, VICRYL PLUS 3-0, SH, 27IN

## (undated) DEVICE — DRESSING, NON-ADHERENT, 3 X 3 IN, STERILE

## (undated) DEVICE — SUTURE, ETHILON, 3-0, 18 IN, PS2, BLACK

## (undated) DEVICE — GLOVE, SURGICAL, SYNTHETIC, DERMAPRENE, 7, PF, LF, GREEN

## (undated) DEVICE — BANDAGE, ESMARK, 6 IN X 9 FT, STERILE

## (undated) DEVICE — Device

## (undated) DEVICE — DRILL BIT FOR T15, 2.5X135MM

## (undated) DEVICE — BANDAGE, COFLEX, 6 X 5 YDS, FOAM TAN, STERILE, LF

## (undated) DEVICE — DRILL BIT FOR T8, 2.0X135MM

## (undated) DEVICE — KWIRE, 1.6MM X 150MM, SS